# Patient Record
Sex: FEMALE | Race: WHITE | Employment: FULL TIME | URBAN - NONMETROPOLITAN AREA
[De-identification: names, ages, dates, MRNs, and addresses within clinical notes are randomized per-mention and may not be internally consistent; named-entity substitution may affect disease eponyms.]

---

## 2017-01-25 ENCOUNTER — OFFICE VISIT (OUTPATIENT)
Dept: ORTHOPEDIC SURGERY | Age: 42
End: 2017-01-25

## 2017-01-25 DIAGNOSIS — M25.562 ACUTE PAIN OF LEFT KNEE: Primary | ICD-10-CM

## 2017-01-25 PROCEDURE — 99203 OFFICE O/P NEW LOW 30 MIN: CPT | Performed by: ORTHOPAEDIC SURGERY

## 2017-01-25 PROCEDURE — 73564 X-RAY EXAM KNEE 4 OR MORE: CPT | Performed by: ORTHOPAEDIC SURGERY

## 2017-02-02 ENCOUNTER — OFFICE VISIT (OUTPATIENT)
Dept: ORTHOPEDIC SURGERY | Age: 42
End: 2017-02-02

## 2017-02-02 VITALS — WEIGHT: 130 LBS | HEIGHT: 65 IN | BODY MASS INDEX: 21.66 KG/M2

## 2017-02-02 DIAGNOSIS — M23.307: Primary | ICD-10-CM

## 2017-02-02 PROBLEM — Q68.6 DISCOID MENISCUS OF KNEE: Status: ACTIVE | Noted: 2017-02-02

## 2017-02-02 PROCEDURE — 20610 DRAIN/INJ JOINT/BURSA W/O US: CPT | Performed by: ORTHOPAEDIC SURGERY

## 2017-02-02 PROCEDURE — 99213 OFFICE O/P EST LOW 20 MIN: CPT | Performed by: ORTHOPAEDIC SURGERY

## 2017-02-02 RX ORDER — LEUPROLIDE ACETATE 11.25 MG
KIT INTRAMUSCULAR
COMMUNITY
Start: 2016-10-28 | End: 2017-03-08

## 2017-02-02 RX ORDER — ONABOTULINUMTOXINA 200 [USP'U]/1
INJECTION, POWDER, LYOPHILIZED, FOR SOLUTION INTRADERMAL; INTRAMUSCULAR
COMMUNITY
Start: 2017-01-23

## 2017-02-22 ENCOUNTER — OFFICE VISIT (OUTPATIENT)
Dept: ORTHOPEDIC SURGERY | Age: 42
End: 2017-02-22

## 2017-02-22 DIAGNOSIS — M25.562 ACUTE PAIN OF LEFT KNEE: Primary | ICD-10-CM

## 2017-02-22 DIAGNOSIS — M23.307: ICD-10-CM

## 2017-02-22 PROCEDURE — 99213 OFFICE O/P EST LOW 20 MIN: CPT | Performed by: ORTHOPAEDIC SURGERY

## 2017-02-22 RX ORDER — RIZATRIPTAN BENZOATE 10 MG/1
TABLET ORAL
COMMUNITY
Start: 2017-02-03

## 2017-02-27 ENCOUNTER — TELEPHONE (OUTPATIENT)
Dept: ORTHOPEDIC SURGERY | Age: 42
End: 2017-02-27

## 2017-02-27 DIAGNOSIS — M23.307: Primary | ICD-10-CM

## 2017-02-27 RX ORDER — HYDROCODONE BITARTRATE AND ACETAMINOPHEN 5; 325 MG/1; MG/1
1 TABLET ORAL EVERY 6 HOURS PRN
Qty: 40 TABLET | Refills: 0 | Status: SHIPPED | OUTPATIENT
Start: 2017-02-27 | End: 2017-03-06

## 2017-02-27 RX ORDER — MELOXICAM 15 MG/1
15 TABLET ORAL DAILY
Qty: 30 TABLET | Refills: 3 | Status: SHIPPED | OUTPATIENT
Start: 2017-02-27

## 2017-03-10 ENCOUNTER — HOSPITAL ENCOUNTER (OUTPATIENT)
Dept: SURGERY | Age: 42
Discharge: HOME | End: 2017-03-10
Attending: ORTHOPAEDIC SURGERY | Admitting: ORTHOPAEDIC SURGERY

## 2017-03-10 VITALS
BODY MASS INDEX: 21.23 KG/M2 | RESPIRATION RATE: 12 BRPM | TEMPERATURE: 97.6 F | OXYGEN SATURATION: 100 % | DIASTOLIC BLOOD PRESSURE: 85 MMHG | HEIGHT: 65 IN | HEART RATE: 72 BPM | SYSTOLIC BLOOD PRESSURE: 106 MMHG | WEIGHT: 127.4 LBS

## 2017-03-10 RX ORDER — ONDANSETRON 2 MG/ML
4 INJECTION INTRAMUSCULAR; INTRAVENOUS EVERY 6 HOURS PRN
Status: DISCONTINUED | OUTPATIENT
Start: 2017-03-10 | End: 2017-03-11 | Stop reason: HOSPADM

## 2017-03-10 RX ORDER — SODIUM CHLORIDE, SODIUM LACTATE, POTASSIUM CHLORIDE, CALCIUM CHLORIDE 600; 310; 30; 20 MG/100ML; MG/100ML; MG/100ML; MG/100ML
INJECTION, SOLUTION INTRAVENOUS CONTINUOUS
Status: DISCONTINUED | OUTPATIENT
Start: 2017-03-10 | End: 2017-03-11 | Stop reason: HOSPADM

## 2017-03-10 RX ORDER — ONDANSETRON 2 MG/ML
4 INJECTION INTRAMUSCULAR; INTRAVENOUS
Status: ACTIVE | OUTPATIENT
Start: 2017-03-10 | End: 2017-03-10

## 2017-03-10 RX ORDER — DIPHENHYDRAMINE HYDROCHLORIDE 50 MG/ML
25 INJECTION INTRAMUSCULAR; INTRAVENOUS PRN
Status: DISCONTINUED | OUTPATIENT
Start: 2017-03-10 | End: 2017-03-11 | Stop reason: HOSPADM

## 2017-03-10 RX ORDER — SODIUM CHLORIDE 9 MG/ML
INJECTION, SOLUTION INTRAVENOUS CONTINUOUS
Status: DISCONTINUED | OUTPATIENT
Start: 2017-03-10 | End: 2017-03-11 | Stop reason: HOSPADM

## 2017-03-10 RX ORDER — SODIUM CHLORIDE 0.9 % (FLUSH) 0.9 %
10 SYRINGE (ML) INJECTION EVERY 12 HOURS SCHEDULED
Status: DISCONTINUED | OUTPATIENT
Start: 2017-03-10 | End: 2017-03-11 | Stop reason: HOSPADM

## 2017-03-10 RX ORDER — ACETAMINOPHEN 325 MG/1
650 TABLET ORAL EVERY 4 HOURS PRN
Status: DISCONTINUED | OUTPATIENT
Start: 2017-03-10 | End: 2017-03-10 | Stop reason: SDUPTHER

## 2017-03-10 RX ORDER — FENTANYL CITRATE 50 UG/ML
25 INJECTION, SOLUTION INTRAMUSCULAR; INTRAVENOUS EVERY 5 MIN PRN
Status: DISCONTINUED | OUTPATIENT
Start: 2017-03-10 | End: 2017-03-11 | Stop reason: HOSPADM

## 2017-03-10 RX ORDER — DOCUSATE SODIUM 100 MG/1
100 CAPSULE, LIQUID FILLED ORAL 2 TIMES DAILY
Status: DISCONTINUED | OUTPATIENT
Start: 2017-03-10 | End: 2017-03-10 | Stop reason: SDUPTHER

## 2017-03-10 RX ORDER — SODIUM CHLORIDE 0.9 % (FLUSH) 0.9 %
10 SYRINGE (ML) INJECTION PRN
Status: DISCONTINUED | OUTPATIENT
Start: 2017-03-10 | End: 2017-03-11 | Stop reason: HOSPADM

## 2017-03-10 RX ORDER — CEFAZOLIN SODIUM 2 G/100ML
2 INJECTION, SOLUTION INTRAVENOUS
Status: DISCONTINUED | OUTPATIENT
Start: 2017-03-10 | End: 2017-03-10 | Stop reason: SDUPTHER

## 2017-03-10 RX ORDER — ACETAMINOPHEN 325 MG/1
650 TABLET ORAL EVERY 4 HOURS PRN
Status: DISCONTINUED | OUTPATIENT
Start: 2017-03-10 | End: 2017-03-11 | Stop reason: HOSPADM

## 2017-03-10 RX ORDER — CEFAZOLIN SODIUM 2 G/100ML
2 INJECTION, SOLUTION INTRAVENOUS
Status: COMPLETED | OUTPATIENT
Start: 2017-03-10 | End: 2017-03-10

## 2017-03-10 RX ORDER — HYDROMORPHONE HCL 110MG/55ML
0.5 PATIENT CONTROLLED ANALGESIA SYRINGE INTRAVENOUS EVERY 5 MIN PRN
Status: DISCONTINUED | OUTPATIENT
Start: 2017-03-10 | End: 2017-03-11 | Stop reason: HOSPADM

## 2017-03-10 RX ORDER — ONDANSETRON 2 MG/ML
4 INJECTION INTRAMUSCULAR; INTRAVENOUS EVERY 6 HOURS PRN
Status: DISCONTINUED | OUTPATIENT
Start: 2017-03-10 | End: 2017-03-10 | Stop reason: SDUPTHER

## 2017-03-10 RX ORDER — DOCUSATE SODIUM 100 MG/1
100 CAPSULE, LIQUID FILLED ORAL 2 TIMES DAILY
Status: DISCONTINUED | OUTPATIENT
Start: 2017-03-10 | End: 2017-03-11 | Stop reason: HOSPADM

## 2017-03-10 RX ORDER — MORPHINE SULFATE 2 MG/ML
1 INJECTION, SOLUTION INTRAMUSCULAR; INTRAVENOUS EVERY 5 MIN PRN
Status: DISCONTINUED | OUTPATIENT
Start: 2017-03-10 | End: 2017-03-11 | Stop reason: HOSPADM

## 2017-03-10 RX ADMIN — CEFAZOLIN SODIUM 2 G: 2 INJECTION, SOLUTION INTRAVENOUS at 08:21

## 2017-03-10 RX ADMIN — SODIUM CHLORIDE, SODIUM LACTATE, POTASSIUM CHLORIDE, CALCIUM CHLORIDE: 600; 310; 30; 20 INJECTION, SOLUTION INTRAVENOUS at 08:02

## 2017-03-10 ASSESSMENT — PAIN - FUNCTIONAL ASSESSMENT: PAIN_FUNCTIONAL_ASSESSMENT: 0-10

## 2017-03-10 ASSESSMENT — PAIN DESCRIPTION - DESCRIPTORS: DESCRIPTORS: ACHING;DISCOMFORT

## 2017-03-23 ENCOUNTER — OFFICE VISIT (OUTPATIENT)
Dept: ORTHOPEDIC SURGERY | Age: 42
End: 2017-03-23

## 2017-03-23 DIAGNOSIS — M25.562 ACUTE PAIN OF LEFT KNEE: Primary | ICD-10-CM

## 2017-03-23 PROCEDURE — 99024 POSTOP FOLLOW-UP VISIT: CPT | Performed by: ORTHOPAEDIC SURGERY

## 2017-03-30 ENCOUNTER — HOSPITAL ENCOUNTER (OUTPATIENT)
Dept: PHYSICAL THERAPY | Age: 42
Discharge: HOME | End: 2017-03-31
Admitting: ORTHOPAEDIC SURGERY

## 2017-05-03 ENCOUNTER — OFFICE VISIT (OUTPATIENT)
Dept: ORTHOPEDIC SURGERY | Age: 42
End: 2017-05-03

## 2017-05-03 DIAGNOSIS — M25.562 ACUTE PAIN OF LEFT KNEE: Primary | ICD-10-CM

## 2017-05-03 DIAGNOSIS — M23.307: ICD-10-CM

## 2017-05-03 PROCEDURE — 99024 POSTOP FOLLOW-UP VISIT: CPT | Performed by: ORTHOPAEDIC SURGERY

## 2017-06-14 ENCOUNTER — OFFICE VISIT (OUTPATIENT)
Dept: ORTHOPEDIC SURGERY | Age: 42
End: 2017-06-14

## 2017-06-14 VITALS — BODY MASS INDEX: 21.16 KG/M2 | HEIGHT: 65 IN | WEIGHT: 127 LBS

## 2017-06-14 DIAGNOSIS — M23.307: ICD-10-CM

## 2017-06-14 DIAGNOSIS — M25.562 ACUTE PAIN OF LEFT KNEE: Primary | ICD-10-CM

## 2017-06-14 PROCEDURE — 99213 OFFICE O/P EST LOW 20 MIN: CPT | Performed by: ORTHOPAEDIC SURGERY

## 2017-06-14 RX ORDER — METHYLPREDNISOLONE 4 MG/1
TABLET ORAL
COMMUNITY
Start: 2017-04-21 | End: 2019-08-19

## 2017-06-14 RX ORDER — AZITHROMYCIN 250 MG/1
TABLET, FILM COATED ORAL
COMMUNITY
Start: 2017-05-26

## 2017-06-14 RX ORDER — HYDROCODONE BITARTRATE AND ACETAMINOPHEN 5; 325 MG/1; MG/1
TABLET ORAL
COMMUNITY
Start: 2017-03-10 | End: 2021-04-29 | Stop reason: ALTCHOICE

## 2018-01-17 ENCOUNTER — OFFICE VISIT (OUTPATIENT)
Dept: ORTHOPEDIC SURGERY | Age: 43
End: 2018-01-17

## 2018-01-17 VITALS — WEIGHT: 127 LBS | BODY MASS INDEX: 21.16 KG/M2 | HEIGHT: 65 IN

## 2018-01-17 DIAGNOSIS — M25.511 ACUTE PAIN OF RIGHT SHOULDER: Primary | ICD-10-CM

## 2018-01-17 PROCEDURE — 73030 X-RAY EXAM OF SHOULDER: CPT | Performed by: ORTHOPAEDIC SURGERY

## 2018-01-17 PROCEDURE — 99214 OFFICE O/P EST MOD 30 MIN: CPT | Performed by: ORTHOPAEDIC SURGERY

## 2018-01-24 ENCOUNTER — OFFICE VISIT (OUTPATIENT)
Dept: ORTHOPEDIC SURGERY | Age: 43
End: 2018-01-24

## 2018-01-24 VITALS — HEIGHT: 65 IN | BODY MASS INDEX: 21.16 KG/M2 | WEIGHT: 127 LBS

## 2018-01-24 DIAGNOSIS — M75.31 CALCIFIC TENDINITIS OF RIGHT SHOULDER: Primary | ICD-10-CM

## 2018-01-24 PROCEDURE — 99214 OFFICE O/P EST MOD 30 MIN: CPT | Performed by: ORTHOPAEDIC SURGERY

## 2018-01-24 RX ORDER — PREDNISONE 10 MG/1
TABLET ORAL
Qty: 30 TABLET | Refills: 0 | Status: SHIPPED | OUTPATIENT
Start: 2018-01-24 | End: 2019-08-19

## 2018-01-29 ENCOUNTER — HOSPITAL ENCOUNTER (OUTPATIENT)
Dept: PHYSICAL THERAPY | Age: 43
Discharge: HOME | End: 2018-01-31
Admitting: ORTHOPAEDIC SURGERY

## 2018-01-29 NOTE — FLOWSHEET NOTE
[address] [place] [address] [place] [address]  Phone: [phone] Fax: [phone]      Physical Therapy Daily Treatment Note  Date:  2018    Patient Name:  Adelita Montalvo    :  1975  MRN: [id number]  Restrictions/Precautions:    Medical/Treatment Diagnosis Information:  · Diagnosis: right shoulder pain  · Treatment Diagnosis: V00.73  Insurance/Certification information:  PT Insurance Information: [place] 40 visits  Physician Information:  Referring Practitioner: Radha Calixto DO  Plan of care signed (Y/N):     Date of Patient follow up with Physician:     G-Code (if applicable):      Date G-Code Applied:    PT G-Codes  Functional Assessment Tool Used: DASH  Score: 23%  Functional Limitation: Carrying, moving and handling objects  Carrying, Moving and Handling Objects Current Status (): At least 20 percent but less than 40 percent impaired, limited or restricted  Carrying, Moving and Handling Objects Goal Status ():  At least 1 percent but less than 20 percent impaired, limited or restricted    Progress Note: [x]  Yes  []  No  Next due by: Visit #10      Latex Allergy:  [x]NO      []YES  Preferred Language for Healthcare:   [x]English       []other:    Visit # Insurance Allowable   1 40     Pain level:  4-7/10     SUBJECTIVE:  See eval    OBJECTIVE: See eval  Observation:   Test measurements:      RESTRICTIONS/PRECAUTIONS:     Exercises/Interventions:   Therapeutic Ex X5 Sets/sec Reps Notes   UBE      Cane AAROM flex/press      3 way Isomet      T- band Row/pinch 2 10 red   T- band lower pinch      T- band ER activation      Supine SA punch      SL ER      Prone Rows/ext 2 10    Seat Table slides/Wall Slides      Seated HH  Depression      No Money      Standing flex/scap      Standing Punch      Lawnmower      UT/levator stretch 20\" 1    ECRB stretch 20\" 1                Manual Intervention X18'      Shld /GH Mobs 5'     c-spine STM 5'     Thoracic/Rib manipualtion      CT/Mobs 5'     PROM MT      Elbow mobs/STM 3'           NMR re-education      T-spine Ext      GH depress/compress      Scap/GH NMR      Body blade      Wall ball roll      Wall Ball bounce      Ball drops      Dionne Scap Bio          Therapeutic Exercise and NMR EXR  [x] (11352) Provided verbal/tactile cueing for activities related to strengthening, flexibility, endurance, ROM  for improvements in scapular, scapulothoracic and UE control with self care, reaching, carrying, lifting, house/yardwork, driving/computer work.    [] (86327) Provided verbal/tactile cueing for activities related to improving balance, coordination, kinesthetic sense, posture, motor skill, proprioception  to assist with  scapular, scapulothoracic and UE control with self care, reaching, carrying, lifting, house/yardwork, driving/computer work. Therapeutic Activities:    [] (39326 or 49526) Provided verbal/tactile cueing for activities related to improving balance, coordination, kinesthetic sense, posture, motor skill, proprioception and motor activation to allow for proper function of scapular, scapulothoracic and UE control with self care, carrying, lifting, driving/computer work.      Home Exercise Program:    [x] (17356) Reviewed/Progressed HEP activities related to strengthening, flexibility, endurance, ROM of scapular, scapulothoracic and UE control with self care, reaching, carrying, lifting, house/yardwork, driving/computer work  [] (18820) Reviewed/Progressed HEP activities related to improving balance, coordination, kinesthetic sense, posture, motor skill, proprioception of scapular, scapulothoracic and UE control with self care, reaching, carrying, lifting, house/yardwork, driving/computer work      Manual Treatments:  PROM / STM / Oscillations-Mobs:  G-I, II, III, IV (PA's, Inf., Post.)  [x] (19915) Provided manual therapy to mobilize soft tissue/joints of cervical/CT, scapular GHJ and UE for the purpose of modulating pain, promoting relaxation,  increasing ROM, reducing/eliminating soft tissue swelling/inflammation/restriction, improving soft tissue extensibility and allowing for proper ROM for normal function with self care, reaching, carrying, lifting, house/yardwork, driving/computer work    Modalities:      Charges:  Timed Code Treatment Minutes: 23   Total Treatment Minutes: 60     [x] EVAL (LOW) 64836 (typically 20 minutes face-to-face)  [] EVAL (MOD) 82928 (typically 30 minutes face-to-face)  [] EVAL (HIGH) 47870 (typically 45 minutes face-to-face)  [] RE-EVAL     [] MT(16876) x      [] IONTO  [] NMR (77657) x      [] VASO  [x] Manual (96091) x  1    [] Other:  [] TA x       [] Mech Traction (12939)  [] ES(attended) (32585)      [] ES (un) (13984):     GOALS:  Patient stated goal: avoid surgery and decrease pain    Therapist goals for Patient:   Short Term Goals: To be achieved in: 2 weeks  1. Independent in HEP and progression per patient tolerance, in order to prevent re-injury. 2. Patient will have a decrease in pain to facilitate improvement in movement, function, and ADLs as indicated by Functional Deficits. Long Term Goals: To be achieved in: 4-6 weeks  1. Disability index score of 10% or less for the DASH to assist with reaching prior level of function. 2. Patient will demonstrate increased AROM to [facility] to allow for proper joint functioning as indicated by patients Functional Deficits. 3. Patient will demonstrate an increase in Strength to good scapular and core control, w/in 5lbs HHD for UE to allow for proper functional mobility as indicated by patients Functional Deficits. 4. Patient will return to reaching, lifting, sleeping activities without increased symptoms or restriction. Progression Towards Functional goals:  [] Patient is progressing as expected towards functional goals listed. [] Progression is slowed due to complexities listed.   [] Progression has been slowed due to co-morbidities.   [x] Plan just implemented, too soon to assess goals progression  [] Other:     ASSESSMENT:  See eval    Return to Play: (if applicable)   []  Stage 1: Intro to Strength   []  Stage 2: Dynamic Strength and Intro to Plyometrics   []  Stage 3: Advanced Plyometrics and Intro to Throwing   []  Stage 4: Sport specific Training/Return to Sport     []  Ready to Return to Play, [employer]   []  Not Ready for Return to Sports   Comments:      Treatment/Activity Tolerance:  [x] Patient tolerated treatment well [] Patient limited by fatique  [] Patient limited by pain  [] Patient limited by other medical complications  [] Other:     Prognosis: [x] Good [] Fair  [] Poor    Patient Requires Follow-up: [x] Yes  [] No    PLAN: See eval  [] Continue per plan of care [] Alter current plan (see comments)  [x] Plan of care initiated [] Hold pending MD visit [] Discharge    Electronically signed by: Sharon Moreno PT

## 2018-01-29 NOTE — PLAN OF CARE
affect course of rehabilitation):   [x]None           Arthritic conditions   []Rheumatoid arthritis (M05.9)  []Osteoarthritis (M19.91)   Cardiovascular conditions   []Hypertension (I10)  []Hyperlipidemia (E78.5)  []Angina pectoris (I20)  []Atherosclerosis (I70)  []CVA Musculoskeletal conditions   []Disc pathology   []Congenital spine pathologies   []Prior surgical intervention  []Osteoporosis (M81.8)  []Osteopenia (M85.8)   Endocrine conditions   []Hypothyroid (E03.9)  []Hyperthyroid Gastrointestinal conditions   []Constipation (S09.60)   Metabolic conditions   []Morbid obesity (E66.01)  []Diabetes type 1(E10.65) or 2 (E11.65)   []Neuropathy (G60.9)     Pulmonary conditions   []Asthma (J45)  []Coughing   []COPD (J44.9)   Psychological Disorders  []Anxiety (F41.9)  []Depression (F32.9)   []Other:   []Other:          Barriers to/and or personal factors that will affect rehab potential:              []Age  []Sex   []Smoker              []Motivation/Lack of Motivation                        []Co-Morbidities              []Cognitive Function, education/learning barriers              []Environmental, home barriers              []profession/work barriers  []past PT/medical experience  []other:  Justification:     Falls Risk Assessment (30 days):   [x] Falls Risk assessed and no intervention required. [] Falls Risk assessed and Patient requires intervention due to being higher risk   TUG score (>12s at risk):     [] Falls education provided, including       G-Codes:  PT G-Codes  Functional Assessment Tool Used: DASH  Score: 23%  Functional Limitation: Carrying, moving and handling objects  Carrying, Moving and Handling Objects Current Status (): At least 20 percent but less than 40 percent impaired, limited or restricted  Carrying, Moving and Handling Objects Goal Status ():  At least 1 percent but less than 20 percent impaired, limited or restricted    ASSESSMENT: Patient presents with signs and symptoms consistent with labral tear   []Signs/symptoms consistent with postural dysfunction    []Signs/symptoms consistent with Glenohumeral IR Deficit - <45 degrees   [x]Signs/symptoms consistent with facet dysfunction of cervical/thoracic spine    []Signs/symptoms consistent with pathology which may benefit from Dry needling     []other:     Prognosis/Rehab Potential:      [x]Excellent   []Good    []Fair   []Poor    Tolerance of evaluation/treatment:    [x]Excellent   []Good    []Fair   []Poor    Physical Therapy Evaluation Complexity Justification  [x] A history of present problem with:  [x] no personal factors and/or comorbidities that impact the plan of care;  []1-2 personal factors and/or comorbidities that impact the plan of care  []3 personal factors and/or comorbidities that impact the plan of care  [x] An examination of body systems using standardized tests and measures addressing any of the following: body structures and functions (impairments), activity limitations, and/or participation restrictions;:  [x] a total of 1-2 or more elements   [] a total of 3 or more elements   [] a total of 4 or more elements   [x] A clinical presentation with:   [x] stable and/or uncomplicated characteristics   [] evolving clinical presentation with changing characteristics  [] unstable and unpredictable characteristics;   [x] Clinical decision making of [x] low, [] moderate, [] high complexity using standardized patient assessment instrument and/or measurable assessment of functional outcome.     [x] EVAL (LOW) 69336 (typically 20 minutes face-to-face)  [] EVAL (MOD) 15773 (typically 30 minutes face-to-face)  [] EVAL (HIGH) 45204 (typically 45 minutes face-to-face)  [] RE-EVAL     PLAN:  Frequency/Duration:  2 days per week for 4-6 Weeks:  INTERVENTIONS:  [x] Therapeutic exercise including: strength training, ROM, for Upper extremity and core   [x]  NMR activation and proprioception for UE, scap and Core   [x] Manual therapy as

## 2018-02-01 ENCOUNTER — HOSPITAL ENCOUNTER (OUTPATIENT)
Dept: OTHER | Age: 43
Discharge: HOME | End: 2018-02-28
Attending: ORTHOPAEDIC SURGERY | Admitting: ORTHOPAEDIC SURGERY

## 2018-02-16 ENCOUNTER — HOSPITAL ENCOUNTER (OUTPATIENT)
Dept: PHYSICAL THERAPY | Age: 43
Discharge: HOME | End: 2018-02-17
Admitting: ORTHOPAEDIC SURGERY

## 2018-02-16 NOTE — FLOWSHEET NOTE
[address] [place] [address] [place] [address]  Phone: [phone] Fax: [phone]      Physical Therapy Daily Treatment Note  Date:  2018    Patient Name:  Jyothi Wooten    :  1975  MRN: [id number]  Restrictions/Precautions:    Medical/Treatment Diagnosis Information:  · Diagnosis: right shoulder pain  · Treatment Diagnosis: B01.63  Insurance/Certification information:  PT Insurance Information: [place] 40 visits  Physician Information:  Referring Practitioner: Rodriguez Salamanca DO  Plan of care signed (Y/N):     Date of Patient follow up with Physician:     G-Code (if applicable):      Date G-Code Applied:    PT G-Codes  Functional Assessment Tool Used: DASH  Score: 23%  Functional Limitation: Carrying, moving and handling objects  Carrying, Moving and Handling Objects Current Status (): At least 20 percent but less than 40 percent impaired, limited or restricted  Carrying, Moving and Handling Objects Goal Status (): At least 1 percent but less than 20 percent impaired, limited or restricted    Progress Note: [x]  Yes  []  No  Next due by: Visit #10      Latex Allergy:  [x]NO      []YES  Preferred Language for Healthcare:   [x]English       []other:    Visit # Insurance Allowable   2 40     Pain level:  0/10     SUBJECTIVE:  Feeling much better after first session. No pain in the shoulder today. Pt is eager to ramp up her UE function.       OBJECTIVE: See eval  Observation:   Test measurements:      RESTRICTIONS/PRECAUTIONS:     Exercises/Interventions:   Therapeutic Ex X30 Sets/sec Reps Notes   UBE   add   Cane AAROM flex/press      3 way Isomet      T- band Row/pinch 2 10 blue   T- band lower pinch 2 10 blue   SL punch 2 10 2lb   T- band ER activation      Supine SA punch 5s 15 2lb   SL ER 2 10 2lb   Prone Rows/ext/HAB 2 10 3/2/2   Seat Table slides/Wall Slides      Seated HH  Depression      No Money      Standing flex/scap

## 2018-02-21 ENCOUNTER — HOSPITAL ENCOUNTER (OUTPATIENT)
Dept: PHYSICAL THERAPY | Age: 43
Discharge: HOME | End: 2018-02-22
Admitting: ORTHOPAEDIC SURGERY

## 2018-02-21 NOTE — FLOWSHEET NOTE
Standing Punch      Lawnmower 2 10 3lb   UT/levator stretch 20\" 1    ECRB stretch 20\" 1                Manual Intervention X10      Shld /GH Mobs 4     c-spine STM      Thoracic/Rib manipualtion      CT/Mobs      PROM MT 6     Elbow mobs/STM            NMR re-education x 15'      T-spine Ext      GH depress/compress      Scap/GH NMR      Body blade 4 30\"    Wall ball roll 2 20 Red  Flex/scap   Wall diagonals 3 5 orange         Dionne Scap Bio          Therapeutic Exercise and NMR EXR  [x] (75482) Provided verbal/tactile cueing for activities related to strengthening, flexibility, endurance, ROM  for improvements in scapular, scapulothoracic and UE control with self care, reaching, carrying, lifting, house/yardwork, driving/computer work.    [] (90001) Provided verbal/tactile cueing for activities related to improving balance, coordination, kinesthetic sense, posture, motor skill, proprioception  to assist with  scapular, scapulothoracic and UE control with self care, reaching, carrying, lifting, house/yardwork, driving/computer work. Therapeutic Activities:    [] (93845 or 13919) Provided verbal/tactile cueing for activities related to improving balance, coordination, kinesthetic sense, posture, motor skill, proprioception and motor activation to allow for proper function of scapular, scapulothoracic and UE control with self care, carrying, lifting, driving/computer work.      Home Exercise Program:    [x] (22165) Reviewed/Progressed HEP activities related to strengthening, flexibility, endurance, ROM of scapular, scapulothoracic and UE control with self care, reaching, carrying, lifting, house/yardwork, driving/computer work  [] (57192) Reviewed/Progressed HEP activities related to improving balance, coordination, kinesthetic sense, posture, motor skill, proprioception of scapular, scapulothoracic and UE control with self care, reaching, carrying, lifting, house/yardwork, driving/computer work      Manual

## 2018-03-01 ENCOUNTER — HOSPITAL ENCOUNTER (OUTPATIENT)
Dept: OTHER | Age: 43
Discharge: HOME | End: 2018-03-31
Attending: ORTHOPAEDIC SURGERY | Admitting: ORTHOPAEDIC SURGERY

## 2018-03-07 ENCOUNTER — OFFICE VISIT (OUTPATIENT)
Dept: ORTHOPEDIC SURGERY | Age: 43
End: 2018-03-07

## 2018-03-07 VITALS — WEIGHT: 126.98 LBS | HEIGHT: 65 IN | BODY MASS INDEX: 21.16 KG/M2

## 2018-03-07 DIAGNOSIS — M75.31 CALCIFIC TENDINITIS OF RIGHT SHOULDER: Primary | ICD-10-CM

## 2018-03-07 PROCEDURE — 99213 OFFICE O/P EST LOW 20 MIN: CPT | Performed by: ORTHOPAEDIC SURGERY

## 2018-03-07 NOTE — PROGRESS NOTES
or lesions. Strength and tone are normal.  Neck: Examination of the neck does not show any tenderness, deformity or injury. Range of motion is unremarkable. There is no gross instability. There are no rashes, ulcerations or lesions. Strength and tone are normal.    Past Surgical History:   Procedure Laterality Date     SECTION      x2    CHOLECYSTECTOMY      Laparoscopic    HYSTERECTOMY      KNEE ARTHROSCOPY Left     KNEE ARTHROSCOPY Left 03/10/2017    PARTIAL LATERAL MENISCECTOMY, SYNOVECTOMY    PELVIC LAPAROSCOPY      x 12 for endometriosis     Assessment:  Right shoulder calcific tendinitis doing extremely well no pain    Impression: Since she is back doing all regular activities we'll just have her continue to do her exercise regime    Office Procedures:  No orders of the defined types were placed in this encounter. Previous Treatments:  X-ray, MRI, physical therapy,    Treatment Plan:  Continue exercises follow-up as needed      [facility]  Board Certified  [employer] Team Physician

## 2019-04-22 ENCOUNTER — OFFICE VISIT (OUTPATIENT)
Dept: ORTHOPEDIC SURGERY | Age: 44
End: 2019-04-22
Payer: COMMERCIAL

## 2019-04-22 VITALS — BODY MASS INDEX: 21.16 KG/M2 | WEIGHT: 126.98 LBS | HEIGHT: 65 IN

## 2019-04-22 DIAGNOSIS — M25.521 RIGHT ELBOW PAIN: Primary | ICD-10-CM

## 2019-04-22 PROCEDURE — 99214 OFFICE O/P EST MOD 30 MIN: CPT | Performed by: ORTHOPAEDIC SURGERY

## 2019-04-22 NOTE — PROGRESS NOTES
History of Present Illness:  Mary Garcia is a 37 y.o. female being seen for the 1st time for right elbow pain severe pain and swelling    Chief complaint that brought the patient in the office today: Right elbow pain      Location right elbow  Severity moderate to severe  Duration since February  Associated sign/symptoms  strength, pain with all motions pain with lifting pain with touching    I have reviewed and discussed the below Pain assessment findings with the patient. Pain Assessment  Location of Pain: Elbow  Location Modifiers: Right  Severity of Pain: 8  Quality of Pain: Aching, Throbbing, Sharp  Duration of Pain: Persistent  Frequency of Pain: Constant  Aggravating Factors: Bending, Straightening, Exercise  Limiting Behavior: Some  Relieving Factors: Nsaids  Result of Injury: No  Work-Related Injury: No  Are there other pain locations you wish to document?: No    Medical History  Patient's medications, allergies, past medical, surgical, social and family histories were reviewed and updated as appropriate.     Past Medical History:   Diagnosis Date    ANTONIA positive     Endometriosis     Lupus     minor,    Meniscus tear     left    Migraines     [place] - neurologist     PONV (postoperative nausea and vomiting)     very severe nausea & vomiting     Family History   Problem Relation Age of Onset    Heart Disease Mother     Diabetes Father      Social History     Socioeconomic History    Marital status:      Spouse name: None    Number of children: None    Years of education: None    Highest education level: None   Occupational History    None   Social Needs    Financial resource strain: None    Food insecurity:     Worry: None     Inability: None    Transportation needs:     Medical: None     Non-medical: None   Tobacco Use    Smoking status: Never Smoker    Smokeless tobacco: Never Used   Substance and Sexual Activity    Alcohol use: Yes     Comment: socially    Drug use: No    Sexual activity: None   Lifestyle    Physical activity:     Days per week: None     Minutes per session: None    Stress: None   Relationships    Social connections:     Talks on phone: None     Gets together: None     Attends [employer] service: None     Active member of club or organization: None     Attends meetings of clubs or organizations: None     Relationship status: None    Intimate partner violence:     Fear of current or ex partner: None     Emotionally abused: None     Physically abused: None     Forced sexual activity: None   Other Topics Concern    None   Social History Narrative    None     Current Outpatient Medications   Medication Sig Dispense Refill    predniSONE (DELTASONE) 10 MG tablet Days1-2:50mg PO QD,Days3-4:40mg PO QD,Days5-6:30mg PO QD,Days7-8:20mg PO QD,Days 9-10:10mg PO QD 30 tablet 0    azithromycin (ZITHROMAX) 250 MG tablet       HYDROcodone-acetaminophen (NORCO) 5-325 MG per tablet .  methylPREDNISolone (MEDROL DOSEPACK) 4 MG tablet       topiramate (TOPAMAX) 200 MG tablet Take 100 mg by mouth 2 times daily      Prenatal Multivit-Min-Fe-FA (PRE- PO) Take by mouth      meloxicam (MOBIC) 15 MG tablet Take 1 tablet by mouth daily 30 tablet 3    rizatriptan (MAXALT) 10 MG tablet prn      OnabotulinumtoxinA (BOTOX) 200 UNITS SOLR Every 3 months       No current facility-administered medications for this visit. Allergies   Allergen Reactions    Ultram [Tramadol Hcl] Itching       REVIEW OF SYSTEMS:   No rashes today  No new numbness  No tingling  No fevers  No depression  No new onset of pain        Pertinent items are noted in HPI  Review of systems reviewed from Patient History Form dated on 19 and available in the patient's chart under the Media tab. Examination:    General Exam:    Vitals: Height 5' 5\" (1.651 m), weight 126 lb 15.8 oz (57.6 kg), not currently breastfeeding.   Constitutional: Patient is injury. Range of motion is unremarkable. There is no gross instability. There are no rashes, ulcerations or lesions. Strength and tone are normal.  Left Lower Extremity: Examination of the left lower extremity does not show any tenderness, deformity or injury. Range of motion is unremarkable. There is no gross instability. There are no rashes, ulcerations or lesions. Strength and tone are normal.  Left Upper Extremity: Examination of the left upper extremity does not show any tenderness, deformity or injury. Range of motion is unremarkable. There is no gross instability. There are no rashes, ulcerations or lesions. Strength and tone are normal.  Neck: Examination of the neck does not show any tenderness, deformity or injury. Range of motion is unremarkable. There is no gross instability. There are no rashes, ulcerations or lesions. Strength and tone are normal.      Diagnostic Testing:    Views AP lateral  and I independently reviewed these films today in my office,   Body Part right elbow Impression no fracture no dislocation no signs of any arthritic wear          Assessment:  Severe lateral epicondylitis with step-off possible tear    Impression: Same    Office Procedures:  Orders Placed This Encounter   Procedures    XR ELBOW RIGHT (2 VIEWS)     Standing Status:   Future     Number of Occurrences:   1     Standing Expiration Date:   4/22/2020       Treatment Plan:  MRI right elbow and follow-up      Hailee Sharp D.O. [address] [place] [address] 20 and Sports Medicine  [facility]  Board Certified  [employer] Team Physician      Disclaimer: \"This note was dictated with voice recognition software. Though review and correction are routine, we apologize for any errors. \"

## 2019-05-02 ENCOUNTER — OFFICE VISIT (OUTPATIENT)
Dept: ORTHOPEDIC SURGERY | Age: 44
End: 2019-05-02
Payer: COMMERCIAL

## 2019-05-02 VITALS — HEIGHT: 65 IN | WEIGHT: 126.98 LBS | BODY MASS INDEX: 21.16 KG/M2

## 2019-05-02 DIAGNOSIS — M25.521 RIGHT ELBOW PAIN: Primary | ICD-10-CM

## 2019-05-02 PROCEDURE — 99214 OFFICE O/P EST MOD 30 MIN: CPT | Performed by: ORTHOPAEDIC SURGERY

## 2019-05-02 RX ORDER — PREDNISONE 10 MG/1
TABLET ORAL
Qty: 30 TABLET | Refills: 0 | Status: SHIPPED | OUTPATIENT
Start: 2019-05-02 | End: 2019-08-19

## 2019-05-02 NOTE — PROGRESS NOTES
History of Present Illness:  Cleatis Kussmaul is a 37 y.o. female recheck evaluation right elbow pain    Location right elbow  Severity moderate to severe  Duration right elbow  Associated sign/symptoms pain with all activities especially extension of the wrist    I have reviewed and discussed the below Pain assessment findings with the patient. Medical History  Patient's medications, allergies, past medical, surgical, social and family histories were reviewed and updated as appropriate.     Past Medical History:   Diagnosis Date    ANTONIA positive     Endometriosis     Lupus     minor,    Meniscus tear     left    Migraines     [place] - neurologist     PONV (postoperative nausea and vomiting)     very severe nausea & vomiting     Family History   Problem Relation Age of Onset    Heart Disease Mother     Diabetes Father      Social History     Socioeconomic History    Marital status:      Spouse name: None    Number of children: None    Years of education: None    Highest education level: None   Occupational History    None   Social Needs    Financial resource strain: None    Food insecurity:     Worry: None     Inability: None    Transportation needs:     Medical: None     Non-medical: None   Tobacco Use    Smoking status: Never Smoker    Smokeless tobacco: Never Used   Substance and Sexual Activity    Alcohol use: Yes     Comment: socially    Drug use: No    Sexual activity: None   Lifestyle    Physical activity:     Days per week: None     Minutes per session: None    Stress: None   Relationships    Social connections:     Talks on phone: None     Gets together: None     Attends [employer] service: None     Active member of club or organization: None     Attends meetings of clubs or organizations: None     Relationship status: None    Intimate partner violence:     Fear of current or ex partner: None     Emotionally abused: None     Physically abused: None     Forced sexual activity: None   Other Topics Concern    None   Social History Narrative    None     Current Outpatient Medications   Medication Sig Dispense Refill    predniSONE (DELTASONE) 10 MG tablet Days1-2:50mg PO QD,Days3-4:40mg PO QD,Days5-6:30mg PO QD,Days7-8:20mg PO QD,Days 9-10:10mg PO QD 30 tablet 0    azithromycin (ZITHROMAX) 250 MG tablet       HYDROcodone-acetaminophen (NORCO) 5-325 MG per tablet .  methylPREDNISolone (MEDROL DOSEPACK) 4 MG tablet       topiramate (TOPAMAX) 200 MG tablet Take 100 mg by mouth 2 times daily      Prenatal Multivit-Min-Fe-FA (PRE- PO) Take by mouth      meloxicam (MOBIC) 15 MG tablet Take 1 tablet by mouth daily 30 tablet 3    rizatriptan (MAXALT) 10 MG tablet prn      OnabotulinumtoxinA (BOTOX) 200 UNITS SOLR Every 3 months       No current facility-administered medications for this visit. Allergies   Allergen Reactions    Ultram [Tramadol Hcl] Itching       REVIEW OF SYSTEMS:   Pertinent items are noted in HPI  Review of systems reviewed from Patient History Form dated on 19 and available in the patient's chart under the Media tab. Examination:    General Exam:    Vitals: Height 5' 5\" (1.651 m), weight 126 lb 15.8 oz (57.6 kg), not currently breastfeeding. Constitutional: Patient is adequately groomed with no evidence of malnutrition  Mental Status: The patient is oriented to time, place and person. The patient's mood and affect are appropriate.         Elbow Examination  Inspection:  No obvious deformity    Palpation:  Severe pain along the lateral epicondyle    Rang of Motion:  Range of motion is limited secondary to pain    Strength:  Excellent strength internal/external rotation and supraspinatus isolation bilaterally,Shoulder shrug is 5 over 5 , cervical spine strength is excellent, flexion extension at the elbow is 5 over 5 wrist and hand strength is equal bilaterally no winging no muscle have her see Dr. Eleno Do, DO

## 2019-05-08 ENCOUNTER — OFFICE VISIT (OUTPATIENT)
Dept: ORTHOPEDIC SURGERY | Age: 44
End: 2019-05-08
Payer: COMMERCIAL

## 2019-05-08 VITALS — HEIGHT: 66 IN | BODY MASS INDEX: 20.89 KG/M2 | WEIGHT: 130 LBS

## 2019-05-08 DIAGNOSIS — M77.01 MEDIAL EPICONDYLITIS OF ELBOW, RIGHT: ICD-10-CM

## 2019-05-08 DIAGNOSIS — M77.11 LATERAL EPICONDYLITIS OF RIGHT ELBOW: Primary | ICD-10-CM

## 2019-05-08 PROCEDURE — 99213 OFFICE O/P EST LOW 20 MIN: CPT | Performed by: ORTHOPAEDIC SURGERY

## 2019-05-08 PROCEDURE — MISCD83 BANDIT: Performed by: ORTHOPAEDIC SURGERY

## 2019-05-08 NOTE — PROGRESS NOTES
Chief Complaint   Patient presents with    Elbow Pain     np right elbow. pain since feb. 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 Symptoms have been intermittent. The pain is aggravated by grasping and lifting and relieved by rest. She has noted no out of proportion swelling, weakness, erythema, or other signs of inflammation. Symptoms are worsening over time. Previous treatment: Physical therapy as well as Medrol Dosepak with minimal benefit  She is also taking an oral anti-inflammatory nonsteroidal as well as wears shean elbow sleeve which does provide some benefit  She does report some intermittent numbness and tingling in her fingers nonspecific and not related to any specific complaints at night  She does have pain at night in her right elbow that does awaken her. An MRI was recently completed of her right elbow demonstrating common extensor tendinosis with partial-thickness tear     was referred for a hand surgery/elbow specialty evaluation by:  Dr. Cindy Acharya. Medical History:  Patient's medications, allergies, past medical, surgical, social and family histories were reviewed and updated as appropriate. ROS: Pertinent items are noted in HPI  Review of systems reviewed from Patient History Form dated on 5/8/19 and available in the patient's chart under the Media tab. PHYSICAL EXAMINATION:    Gen/Psych: Examination reveals a pleasant individual in no acute distress. The patient is oriented to time, place and person. The patient's mood and affect are appropriate. Lymph: The lymphatic examination bilaterally reveals all areas to be without enlargement or induration. Skin: intact without lymphadenopathy, discoloration, or abnormal temperature. Vascular:  There is intact, symmetric circulation in both upper extremities. Musculoskeletal       Cervical spine, shoulders, wrist:  satisfactory baseline range of motion,                                                                           strength, and stability         right Elbow:   Satisfactory range of motion, tenderness over the lateral epicondyle and common extensor origin with mild tenderness medially. Minimal discomfort at the radial tunnel. There is pain and mild weakness with resisted wrist and middle finger extension testing. Range of motion of elbow 5-130° compared to 0-135° contralateral elbow, no mechanical symptoms throughout range of motion  Painless gentle active pronation and supination 70° and 70°  Negative Tinel's overlying cubital tunnel and no subluxation of ulnar nerve with range of motion         Contralateral Elbow:  Satisfactory range of motion. No significant tenderness over lateral and medial epicondyle. No significant pain or weakness with resisted wrist/finger extensor, supination, flexor, and pronator strength testing. Neurological:  Sensation is subjectively normal in the forearm and hands bilaterally    DIAGNOSTIC TESTING:     X-rays  reviewed in office:  Images from 2019  Impression 2views of affected elbow reveal satisfactory joint congruity and absence of loose body or fracture. Site: [facility] #: [id number] #: [id number] Procedure: MR Right Elbow joint w/o Contrast ; Reason for Exam: DX: Tendon Injury; This document is confidential medical information.  Unauthorized disclosure or use of this information is prohibited by law. If you are not the intended recipient of this document, please advise us by calling immediately [phone].       [facility] [place] [address] [address]           Patient Name: Ryley Mena   Case ID: [id number]   Patient : 1975   Referring Physician: Cleopatra Topete DO   Exam Date: 2019   Exam Description: MR Right Elbow joint w/o Contrast            HISTORY:  Tendon injury.  Pain in the entire joint.       TECHNICAL FACTORS:  Long- and short-axis fat- and water-weighted images were performed.       COMPARISON:  None.       FINDINGS:  Distal biceps, triceps and brachialis tendons intact.       Mild to moderate grade common extensor origin tendinosis/strain and peritendinitis.     Approximately 2.5 x 1.5 x 2mm deep surface partial-thickness tear at the common extensor tendon    origin noted (coronal T2 SPAIR series 301, image 11; sagittal T2 SPAIR series 901, image 15).  Underlying LCL complex intact.       Common flexor tendon origin and underlying MCL complex are intact.       The course of the ulnar nerve in the cubital tunnel is normal.       CONCLUSION:   Mild to moderate grade common extensor origin tendinosis/strain, with a 2.5 x 1.5 x 2mm deep    surface partial-thickness tear.       Thank you for the opportunity to provide your interpretation.               Marlena Helton Asa, MD       A: DESEAN/chucho 04/29/2019 9:06 PM   T: CHUCHO 04/29/2019 9:54 AM       MRI right elbow from 4/29/2019      IMPRESSION AND PLAN: 39-year-old female with approximately 3 months of persistent right lateral elbow pain  1. 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 We discussed the current concepts regarding this diagnosis and the appropriate gold standard evidence-based interventions including rest, lifting modifications, appropriate use of a tennis elbow forearm strap, and physical therapy. This frustrating entity may often require many months before expected resolution. We will provide the appropriate means for conservative care at this time. Appropriate followup plans are discussed with the patient depending on the level of progress with the conservative care.     The patient has initiated conservative management has had frustration secondary to lack of improvement in symptoms. I do think that some of her persistent symptoms are contributed by her activity persistent gripping and lifting both at work and during her exercising. I did offer her referral specifically to our occupational therapist at [facility] [address] for further discussion and education regarding some of her daily activities that I do think the patient will find beneficial. I think that activity modification combined with some modalities and exercises will be beneficial and the patient is in agreement with this    In addition we did provide her with a forearm counterforce brace today to be worn while at work and appropriate application technique discussed with her today    Ultimately if she does not have any improvement with her most recent treatment and I would consider either surgical intervention or referral to additional treatment options such as PRP injection or  Tenex procedure that the patient was provided with information today. I do think this will improve however with our most recent treatment.  She may have a component of medial epicondylitis as well but his less evident and ultimately some of her gripping modification should help with this as well  Offered to see her back at approximate 6 weeks for repeat evaluation of symptoms, call sooner with any questions or concerns

## 2019-06-05 ENCOUNTER — HOSPITAL ENCOUNTER (OUTPATIENT)
Dept: OCCUPATIONAL THERAPY | Age: 44
Setting detail: THERAPIES SERIES
Discharge: HOME | End: 2019-06-05
Payer: COMMERCIAL

## 2019-06-05 PROCEDURE — 97535 SELF CARE MNGMENT TRAINING: CPT | Performed by: OCCUPATIONAL THERAPIST

## 2019-06-05 PROCEDURE — 97110 THERAPEUTIC EXERCISES: CPT | Performed by: OCCUPATIONAL THERAPIST

## 2019-06-05 PROCEDURE — 97112 NEUROMUSCULAR REEDUCATION: CPT | Performed by: OCCUPATIONAL THERAPIST

## 2019-06-05 PROCEDURE — 97165 OT EVAL LOW COMPLEX 30 MIN: CPT | Performed by: OCCUPATIONAL THERAPIST

## 2019-06-05 NOTE — PLAN OF CARE
[facility], [place]  [address],  [address], [facility]  Phone: [phone] Fax: [phone]      Occupational [employer]  Dear Referring Practitioner: Johnny Carlson MD,     We had the pleasure of evaluating the following patient for occupational therapy services at [address] [place] [address]. A summary of our findings can be found in the initial assessment below. This includes our plan of care. If you have any questions or concerns regarding these findings, please do not hesitate to contact me at the office phone number checked above. Thank you for the referral.     Physician Signature:_______________________________Date:__________________  By signing above (or electronic signature), therapists plan is approved by physician      Patient: Reyna Brand   : 1975   MRN: [id number]  Referring Physician: Referring Practitioner: Johnny Carlson MD      Evaluation Date: 2019      Medical Diagnosis Information:  Diagnosis: M77.11 (ICD-10-CM) - Lateral epicondylitis of right elbow, M77.01 (ICD-10-CM) - Medial epicondylitis of elbow, right    Treatment Diagnosis: R elbow pain - M25.521              Insurance information: OT Insurance Information: BCBS  Date of Injury: NA  Date of Surgery: NA      Precautions/ Contra-indications: -  Latex Allergy:  [x]No      []Yes  Pacemaker:  [x] No       [] Yes     Preferred Language for Healthcare:   [x]English       []other:    [x] Patient reported history, allergies, and medications reviewed - see intake form.      SUBJECTIVE: Patient reported deficits/history of current problem: progressive pain in R elbow starting in November, then got much worse in February; seen by PT x 4 weeks with no relief (strengthening exercises, estim); seen by hand surgeon ~ 3 weeks ago who issued Band-It and referred pt to hand therapy    Functional Disability Index: Quick DASH score - 50% perceived disability taken on 6/5/2019    Pain Scale: 5/10 at rest, 9/10 at times  [x]Constant      []Intermittent    []other:  Pain Location:  R elbow  Easing factors: Band-It  Provocative factors: lifting, picking up items, working out, squeezing      Occupational Profile:  Home Enviroment: lives with  [] spouse,  [x] family,  [] alone,  [] significant other,   [] other:    Occupation/School: dental asst    Recreational Activities/Meaningful Interests: working out, gardening    Prior Level of Function: [x] Independent with ADLs/IADLs     [] Assistance needed (describe):    Patient-Identified Primary Performance Deficits (to be addressed in POC):   [] bathing    [x] household tasks    [] dressing    [] self feeding   [] grooming    [] work/education   [] functional mobility   [] sleeping/rest   [] toileting/hygiene   [x] recreational activities   [] driving    [] community/social participation   [] other:     Comorbidities Affecting Functional Performance:     []Anxiety (F41.9)/Depression (F32.9)   []Diabetes Type 1(E10.65) or 2 (E11.65)   []Rheumatoid Arthritis (M05.9)  []Fibromyalgia (M79.7)  []Neuropathy(G60.9)  []Osteoarthritis(M19.91)  [x]None   []Other:    Hand Dominance:    [x]  Right    [] Left      OBJECTIVE:    Involved    AROM: Right Left   Digits: tips to DPFC   0cm   0cm   Thumb tip to DPFC 0cm 0cm   Wrist Ext/Flex            RD/UD 75/77 73/75   Forearm sup/pron   WNL WNL   Elbow ext/flex   5/135 5HE/135   Shoulder Flex                   Abd                   IR/ER WNL WNL        Edema:      At elbow  At wrist 25.6cm  15.7cm 25.1cm  15.9cm        Strength:      II 34 with pain 52   Lateral Pinch 14 15   3 Point Pinch 9 10   Tip Pinch 6 7   MMT: elbow ext                     Flex             Wrist ext                      flex     5/5   5/5  5/5 (with pain)  5/5   5/5  5/5  5/5  5/5       Observations (including splints, bandages, incisions, scars):   Presents with Band-It on R elbow; upon removal, notable divet where increased pressure had been applied    Sensation:  [] No reported deficits  [x] Intact to light touch    [] [place] Trisha test completed, findings as noted:  [] Other:    Palpation: tender over lateral elbow, proximal FA (~ 4 finger breadths distal to LE), and medial epicondyle    Functional Mobility/Transfers/Gait:  [x] Independent - no significant gait deviations  [] Assistance needed   [] Assistive device used: Falls Risk Assessment (30 days):   [x] Falls Risk assessed and no intervention required. [] Falls Risk assessed and Patient requires intervention due to being higher risk   TUG score (>12s at risk):     [] Falls education provided, including      Review Of Systems (ROS): [x]Performed Review of systems (Integumentary, CardioPulmonary, Neurological) by intake and observation. Intake form has been scanned into medical record. Patient has been instructed to contact their primary care physician regarding ROS issues if not already being addressed at this time. ASSESSMENT:   This patient presents with signs and symptoms consistent with the medical diagnosis provided by the referring physician.      Impairments (physical, cognitive and/or psychosocial):  [x] Decreased mobility  [x] Weakness    [] Hypersensitivity   [x] Pain/tenderness   [] Edema/swelling   [] Decreased coordination (fine/gross motor)   [] Impaired body mechanics  [] Sensory loss  [] Loss of balance   [] Other:      Performance Deficits (to be addressed in plan of care):   [] Bathing    [x] Household Tasks    [] Dressing    [] Self Feeding   [] Grooming    [] Work/Education   [] Functional Mobility   [] Sleeping/Rest   [] Toileting/Hygiene   [x] Recreational Activities   [] Driving    [] Community/Social Participation   [] Other:     Rehab Potential:   [] Excellent [x] Good [] Fair  [] Poor     Barriers affecting rehab potential:  []Age    []Lack of Motivation   []Co-Morbidities  []Cognitive

## 2019-06-05 NOTE — FLOWSHEET NOTE
[facility], [place]   [address],  [address], [address] [place] [address]  Phone: [phone] Fax: [phone]      Hand Therapy Daily Treatment Note  Date:  2019    Patient: Patience Ovalle   : 1975   MRN: [id number]  Referring Physician: Referring Practitioner: Surekha Munguia MD       Medical Diagnosis Information:  Diagnosis: M77.11 (ICD-10-CM) - Lateral epicondylitis of right elbow, M77.01 (ICD-10-CM) - Medial epicondylitis of elbow, right    Treatment Diagnosis: R elbow pain - M25.521                                         Insurance information:   Date of Injury:NA  Date of Surgery:NA      Visit # Insurance Allowable   1 40     Date of Patient follow up with Physician: prn    Progress Note: []  Yes  [x]  No  Next due by: Visit #10      Latex Allergy:  [x]No      []Yes  Pacemaker:  [x] No       [] Yes     Preferred Language for Healthcare:   [x]English       []other:    SUBJECTIVE: Patient reported deficits/history of current problem: progressive pain in R elbow starting in November, then got much worse in February; seen by PT x 4 weeks with no relief (strengthening exercises, estim); seen by hand surgeon ~ 3 weeks ago who issued Band-It and referred pt to hand therapy     Functional Disability Index: Quick DASH score - 50% perceived disability taken on 2019    Pain Scale: 5/10 at rest, 9/10 at times            [x]Constant         []Intermittent      []other:         RESTRICTIONS/PRECAUTIONS: -    OBJECTIVE:       Date:  2019       Objective Measures:        Elbow ext/flex   See eval        II  Lateral Pinch  3 Point Pinch  Tip Pinch  MMT: wrist ext 34 with pain  14  9  6  5/5 with pain               Modalities:         -                       Therapeutic Exercise, Activities, NMR:        AROM Elbow x 5    Shoulder circles x 5       Proximal/scapular stabilization Prone shoulder ext, \"supermans\", HAB x 10 each       Conditioning Eccentric wrist ext, 1# x 10       Exercise Retraining/NMR Cable Column - 10x each - lat pull down (20#), elbow flex(5#) with horizontal bar and with rope attachment (cueing for submaximal , neutral wrist, painfree exer performance)    Biceps curls (2#) R x 10 (cueing as above)    Tband column - B elbow ext/triceps press, shoulder ext x 10 each (cueing as above)       Composite Stretching 10x wrist ext/flex with elbow ext       ADL Retraining Instructed on diagnosis specific anatomy, joint protection, and ADL modifications - handouts provided       Corner Stretch 5x                 Therapeutic Exercise and NMR:  [x] (68111) Provided verbal/tactile cueing for activities related to strengthening, flexibility, endurance, ROM  for improvements in scapular, scapulothoracic and UE control with self care, reaching, carrying, lifting, house/yardwork, driving/computer work. [x] (19711) Provided verbal/tactile cueing for activities related to improving balance, coordination, kinesthetic sense, posture, motor skill, proprioception  to assist with  scapular, scapulothoracic and UE control with self care, reaching, carrying, lifting, house/yardwork, driving/computer work.   [] Comments:    Therapeutic Activities:    [] (58900 or 17179) Provided verbal/tactile cueing for activities related to improving balance, coordination, kinesthetic sense, posture, motor skill, proprioception and motor activation to allow for proper function of scapular, scapulothoracic and UE control with self care, carrying, lifting, driving/computer work  [] Comments:    Home Exercise Program:    [x] (46428) Reviewed/Progressed HEP activities related to strengthening, flexibility, endurance, ROM of scapular, scapulothoracic and UE control with self care, reaching, carrying, lifting, house/yardwork, driving/computer work  [] (80503) Reviewed/Progressed HEP activities related to improving balance, coordination, kinesthetic sense, posture, motor skill, proprioception of scapular, scapulothoracic and UE control with self care, reaching, carrying, lifting, house/yardwork, driving/computer work    [x] Comments: see sheet    Manual Treatments:  PROM / STM / Oscillations-Mobs:  G-I, II, III, IV (PA's, Inf., Post.)  [x] (44920) Provided manual therapy to mobilize soft tissue/joints of cervical/CT, scapular GHJ and UE for the purpose of modulating pain, promoting relaxation,  increasing ROM, reducing/eliminating soft tissue swelling/inflammation/restriction, improving soft tissue extensibility and allowing for proper ROM for normal function with self care, reaching, carrying, lifting, house/yardwork, driving/computer work  [x] Comments: 3' STM, CFM    ADL Training:  [x] (93095) Provided self-care/home management training related to activities of daily living and compensatory training, and/or use of adaptive equipment   [x] Comments: activities of daily living       Splinting:  [] Fabrication of:   [] (26626) Orthotic/Prosthetic Management, subsequent encounter  [] (13384) Orthotic management and training (fitting and assessment)  [x] Comments:reviewed indications for and aplication ofBand-it strap    Charges:  Timed Code Treatment Minutes: 45   Total Treatment Minutes: 60     [x] EVAL (LOW) 55169   [] OT Re-eval (54332)  [] EVAL (MOD) 13589   [] EVAL (HIGH) 52298       [x] Rivera (70526) x   1  [] PSFWV(84810)  [x] NMR (32645) x   1  [] Estim (attended) (63688)   [] Manual ([id number]) x     [] US (48794)  [] TA (42233) x     [] Paraffin (62623)  [x] ADL  (71171) x  1  [] Splint/L code:    [] Estim (unattended) (26458)  [] Fluidotherapy (48119)  [] Other:    GOALS:  Short Term Goals: To be achieved in: 2 weeks  1. Independent in HEP and progression per patient tolerance, in order to prevent re-injury.    2. Patient will have a decrease in pain to facilitate improvement in movement, function, and ADLs as indicated by Functional Deficits.     Long Term Goals to be achieved in 4-6 weeks, including patient directed goals to address identified performance deficits:  1) Pt to be independent in graded HEP progression with a good level of effort and compliance. 2) Pt to report a score of 25 % or less on the Quick DASH disability questionnaire for increased performance with carrying, moving, and handling objects. 3) Pt will demonstrate increased ROM to R elbow ext increased to 0 degres for improved independence with reaching tasks (reaching into kitchen cabinets). 4) Pt will demonstrate increased strength to R /pinch >/= 75% of L for improved independence with opening jars/containers. 5) Pt will have a decrease in pain to 1-2/10 to facilitate return to working out (recreational activities). 6) Pt will verbalize understanding and demonstrate competency with diagnosis specific ADL modifications and joint protection techniques to enable independence with ADLs, household, and recreational activities. 7)        Progression Towards Functional goals:  [] Patient is progressing as expected towards functional goals listed. [] Progression is slowed due to complexities listed. [] Progression has been slowed due to co-morbidities. [x] Plan just implemented, too soon to assess goals progression  [] All goals are met  [] Other:     ASSESSMENT:  See eval    Treatment/Activity Tolerance:  [x] Patient tolerated treatment well [] Patient limited by fatique  [] Patient limited by pain  [] Patient limited by other medical complications  [] Other:     Prognosis: [x] Good [] Fair  [] Poor    Patient Requires Follow-up: [x] Yes  [] No    PLAN: See eval  [] Continue per plan of care [] Alter current plan (see comments)  [x] Plan of care initiated [] Hold pending MD visit [] Discharge    If patient does not return for further follow ups after this date, please consider this as the patient's discharge from occupational therapy. Electronically signed by:  Marleni Plummer OTR/L, PT, MPT, CHT, [id number], [id number]

## 2019-06-12 ENCOUNTER — HOSPITAL ENCOUNTER (OUTPATIENT)
Dept: OCCUPATIONAL THERAPY | Age: 44
Setting detail: THERAPIES SERIES
Discharge: HOME | End: 2019-06-12
Payer: COMMERCIAL

## 2019-06-12 PROCEDURE — 97110 THERAPEUTIC EXERCISES: CPT | Performed by: OCCUPATIONAL THERAPIST

## 2019-06-12 PROCEDURE — 97112 NEUROMUSCULAR REEDUCATION: CPT | Performed by: OCCUPATIONAL THERAPIST

## 2019-06-12 PROCEDURE — 97035 APP MDLTY 1+ULTRASOUND EA 15: CPT | Performed by: OCCUPATIONAL THERAPIST

## 2019-06-12 PROCEDURE — 97140 MANUAL THERAPY 1/> REGIONS: CPT | Performed by: OCCUPATIONAL THERAPIST

## 2019-06-12 NOTE — FLOWSHEET NOTE
[facility]  [address],  [address], [facility]  Phone: [phone] Fax: [phone]      Hand Therapy Daily Treatment Note  Date:  2019    Patient: Neelam Kelley   : 1975   MRN: [id number]  Referring Physician: Referring Practitioner: Tobin Burgess MD       Medical Diagnosis Information:  Diagnosis: M77.11 (ICD-10-CM) - Lateral epicondylitis of right elbow, M77.01 (ICD-10-CM) - Medial epicondylitis of elbow, right    Treatment Diagnosis: R elbow pain - M25.521                                         Insurance information:   Date of Injury:NA  Date of Surgery:NA      Visit # Insurance Allowable   2 40     Date of Patient follow up with Physician: prn    Progress Note: []  Yes  [x]  No  Next due by: Visit #10      Latex Allergy:  [x]No      []Yes  Pacemaker:  [x] No       [] Yes     Preferred Language for Healthcare:   [x]English       []other:    SUBJECTIVE: doing some better however increased use and soreness yesterday for long work day; compliant with HEP; c/o intermittent locking of LF in the morning    Patient reported deficits/history of current problem: progressive pain in R elbow starting in November, then got much worse in February; seen by PT x 4 weeks with no relief (strengthening exercises, estim); seen by hand surgeon ~ 3 weeks ago who issued Band-It and referred pt to hand therapy     Functional Disability Index: Quick DASH score - 50% perceived disability taken on 2019    Pain Scale: 6/10            [x]Constant         []Intermittent      []other:         RESTRICTIONS/PRECAUTIONS: -    OBJECTIVE:       Date:  2019      Objective Measures:        Elbow ext/flex   See eval 0/140       II  Lateral Pinch  3 Point Pinch  Tip Pinch  MMT: wrist ext 34 with pain  14  9  6  5/5 with pain 44 with pain  14  10  6.5  5/5      Digits: tips to DPFC    AROM WNL,  Small nodule palpated at base of R LF (no formal locking/  triggering at this time)      Modalities:        US, cont, 1.2wcm2, 1MHz, 8' - R lateral elbow                      Therapeutic Exercise, Activities, NMR:        AROM Elbow x 5    Shoulder circles x 5 Elbow x 5    Shoulder circles x 5        Proximal/scapular stabilization Prone shoulder ext, \"supermans\", HAB x 10 each       Conditioning Eccentric wrist ext, 1# x 10       Exercise Retraining/NMR Cable Column - 10x each - lat pull down (20#), elbow flex(5#) with horizontal bar and with rope attachment (cueing for submaximal , neutral wrist, painfree exer performance)    Biceps curls (2#) R x 10 (cueing as above)    Tband column - B elbow ext/triceps press, shoulder ext x 10 each (cueing as above) Therabar, red, B sup/pron x 10x2, R UE sup/pron 10x2 - cueing for submaximal gripping, neutral wrist, proximal stabilization      Composite Stretching 10x wrist ext/flex with elbow ext 10x manual stretching    10x2 composite stretching into Power Web      ADL Retraining Instructed on diagnosis specific anatomy, joint protection, and ADL modifications - handouts provided Reviewed modifications and potential suggestions for work tasks      Corner Stretch 5x                 Therapeutic Exercise and NMR:  [x] (25416) Provided verbal/tactile cueing for activities related to strengthening, flexibility, endurance, ROM  for improvements in scapular, scapulothoracic and UE control with self care, reaching, carrying, lifting, house/yardwork, driving/computer work. [x] (79151) Provided verbal/tactile cueing for activities related to improving balance, coordination, kinesthetic sense, posture, motor skill, proprioception  to assist with  scapular, scapulothoracic and UE control with self care, reaching, carrying, lifting, house/yardwork, driving/computer work.   [x] Comments: discussed potential early signs/symptoms of trigger finger (LF), advised pt consult MD as indicated; advised on gentle modified ROM if symptoms persist    Therapeutic Activities:    [] (57654 or 02196) Provided verbal/tactile cueing for activities related to improving balance, coordination, kinesthetic sense, posture, motor skill, proprioception and motor activation to allow for proper function of scapular, scapulothoracic and UE control with self care, carrying, lifting, driving/computer work  [] Comments:    Home Exercise Program:    [x] (48761) Reviewed/Progressed HEP activities related to strengthening, flexibility, endurance, ROM of scapular, scapulothoracic and UE control with self care, reaching, carrying, lifting, house/yardwork, driving/computer work  [x] (28157) Reviewed/Progressed HEP activities related to improving balance, coordination, kinesthetic sense, posture, motor skill, proprioception of scapular, scapulothoracic and UE control with self care, reaching, carrying, lifting, house/yardwork, driving/computer work    [] Comments:     Manual Treatments:  PROM / STM / Oscillations-Mobs:  G-I, II, III, IV (PA's, Inf., Post.)  [x] (87009) Provided manual therapy to mobilize soft tissue/joints of cervical/CT, scapular GHJ and UE for the purpose of modulating pain, promoting relaxation,  increasing ROM, reducing/eliminating soft tissue swelling/inflammation/restriction, improving soft tissue extensibility and allowing for proper ROM for normal function with self care, reaching, carrying, lifting, house/yardwork, driving/computer work  [x] Comments:12' STM, CFM, DTM, AA/PROM    ADL Training:  [x] (62107) Provided self-care/home management training related to activities of daily living and compensatory training, and/or use of adaptive equipment   [x] Comments: Reviewed on diagnosis specific anatomy, joint protection, and ADL modifications       Splinting:  [] Fabrication of:   [] (01441) Orthotic/Prosthetic Management, subsequent encounter  [] (43202) Orthotic management and training (fitting and assessment)  [] Comments:    Charges:  Timed Code Treatment Minutes: 53   Total Treatment Minutes: 60     [] EVAL (LOW) 88814   [] OT Re-eval (86436)  [] EVAL (MOD) 51317   [] EVAL (HIGH) 12831       [x] Rivera (51634) x   1  [] ZDORG(85790)  [x] NMR (40891) x   1  [] Estim (attended) (90695)   [x] Manual (12940) x 1     [x] US (74420)  [] TA (10917) x     [] Paraffin (30303)  [] ADL  (80225) x   [] Splint/L code:    [] Estim (unattended) (30895)  [] Fluidotherapy (35067)  [] Other:    GOALS:  Short Term Goals: To be achieved in: 2 weeks  1. Independent in HEP and progression per patient tolerance, in order to prevent re-injury. 2. Patient will have a decrease in pain to facilitate improvement in movement, function, and ADLs as indicated by Functional Deficits.     Long Term Goals to be achieved in 4-6  weeks, including patient directed goals to address identified performance deficits:  1) Pt to be independent in graded HEP progression with a good level of effort and compliance. 2) Pt to report a score of 25 % or less on the Quick DASH disability questionnaire for increased performance with carrying, moving, and handling objects. 3) Pt will demonstrate increased ROM to R elbow ext increased to 0 degres for improved independence with reaching tasks (reaching into kitchen cabinets). 4) Pt will demonstrate increased strength to R /pinch >/= 75% of L for improved independence with opening jars/containers. 5) Pt will have a decrease in pain to 1-2/10 to facilitate return to working out (recreational activities). 6) Pt will verbalize understanding and demonstrate competency with diagnosis specific ADL modifications and joint protection techniques to enable independence with ADLs, household, and recreational activities. 7)        Progression Towards Functional goals:  [x] Patient is progressing as expected towards functional goals listed. [] Progression is slowed due to complexities listed. [] Progression has been slowed due to co-morbidities.   [] Plan just implemented, too soon to assess goals progression  [] All goals are met  [] Other:     ASSESSMENT:  Overall pain decreased; potential early signs/symptoms of trigger finger R LF; continue to reinforce activity modifications    Treatment/Activity Tolerance:  [x] Patient tolerated treatment well [] Patient limited by fatique  [] Patient limited by pain  [] Patient limited by other medical complications  [] Other:     Prognosis: [x] Good [] Fair  [] Poor    Patient Requires Follow-up: [x] Yes  [] No    PLAN:   [x] Continue per plan of care [] Alter current plan (see comments)  [] Plan of care initiated [] Hold pending MD visit [] Discharge    If patient does not return for further follow ups after this date, please consider this as the patient's discharge from occupational therapy. Electronically signed by:  Ham Sultana OTR/L, PT, MPT, [address] [place] [address], [phone], [phone]

## 2019-06-26 ENCOUNTER — APPOINTMENT (OUTPATIENT)
Dept: OCCUPATIONAL THERAPY | Age: 44
End: 2019-06-26
Payer: COMMERCIAL

## 2019-07-23 ENCOUNTER — HOSPITAL ENCOUNTER (OUTPATIENT)
Dept: OCCUPATIONAL THERAPY | Age: 44
Setting detail: THERAPIES SERIES
Discharge: HOME | End: 2019-07-23
Payer: COMMERCIAL

## 2019-07-23 DIAGNOSIS — M77.01 MEDIAL EPICONDYLITIS OF ELBOW, RIGHT: Primary | ICD-10-CM

## 2019-07-23 DIAGNOSIS — M77.11 LATERAL EPICONDYLITIS OF RIGHT ELBOW: ICD-10-CM

## 2019-07-23 PROCEDURE — 97110 THERAPEUTIC EXERCISES: CPT | Performed by: OCCUPATIONAL THERAPIST

## 2019-07-23 PROCEDURE — 97140 MANUAL THERAPY 1/> REGIONS: CPT | Performed by: OCCUPATIONAL THERAPIST

## 2019-07-23 PROCEDURE — 97035 APP MDLTY 1+ULTRASOUND EA 15: CPT | Performed by: OCCUPATIONAL THERAPIST

## 2019-07-24 ENCOUNTER — TELEPHONE (OUTPATIENT)
Dept: ORTHOPEDIC SURGERY | Age: 44
End: 2019-07-24

## 2019-07-24 NOTE — TELEPHONE ENCOUNTER
RETURNED PATIENT CALL- WANTED TO GET MORE INFO ON PRP & TENEX. ALSO WANTED TO SCHEDULE AN APPT. EXPLAINED SHE WOULD NEED TO SCHEDULE W/ DR. Mario Alberto Bolden

## 2019-07-31 ENCOUNTER — APPOINTMENT (OUTPATIENT)
Dept: OCCUPATIONAL THERAPY | Age: 44
End: 2019-07-31
Payer: COMMERCIAL

## 2019-08-07 ENCOUNTER — OFFICE VISIT (OUTPATIENT)
Dept: ORTHOPEDIC SURGERY | Age: 44
End: 2019-08-07
Payer: COMMERCIAL

## 2019-08-07 DIAGNOSIS — M66.221 SPONTANEOUS RUPTURE OF EXTENSOR TENDON OF RIGHT UPPER ARM: ICD-10-CM

## 2019-08-07 DIAGNOSIS — M77.11 LATERAL EPICONDYLITIS OF RIGHT ELBOW: ICD-10-CM

## 2019-08-07 DIAGNOSIS — M66.331 SPONTANEOUS RUPTURE OF FLEXOR TENDON OF RIGHT FOREARM: ICD-10-CM

## 2019-08-07 DIAGNOSIS — M77.01 MEDIAL EPICONDYLITIS OF ELBOW, RIGHT: Primary | ICD-10-CM

## 2019-08-07 PROCEDURE — 99243 OFF/OP CNSLTJ NEW/EST LOW 30: CPT | Performed by: INTERNAL MEDICINE

## 2019-08-08 PROCEDURE — L3908 WHO COCK-UP NONMOLDE PRE OTS: HCPCS | Performed by: INTERNAL MEDICINE

## 2019-08-19 ENCOUNTER — OFFICE VISIT (OUTPATIENT)
Dept: ORTHOPEDIC SURGERY | Age: 44
End: 2019-08-19
Payer: COMMERCIAL

## 2019-08-19 VITALS — HEIGHT: 66 IN | WEIGHT: 130 LBS | BODY MASS INDEX: 20.89 KG/M2

## 2019-08-19 DIAGNOSIS — M66.221 SPONTANEOUS RUPTURE OF EXTENSOR TENDON OF RIGHT UPPER ARM: ICD-10-CM

## 2019-08-19 DIAGNOSIS — M77.01 MEDIAL EPICONDYLITIS OF ELBOW, RIGHT: Primary | ICD-10-CM

## 2019-08-19 DIAGNOSIS — M77.11 LATERAL EPICONDYLITIS OF RIGHT ELBOW: ICD-10-CM

## 2019-08-19 DIAGNOSIS — M66.331 SPONTANEOUS RUPTURE OF FLEXOR TENDON OF RIGHT FOREARM: ICD-10-CM

## 2019-08-19 PROCEDURE — 99999 PR OFFICE/OUTPT VISIT,PROCEDURE ONLY: CPT | Performed by: INTERNAL MEDICINE

## 2019-08-19 PROCEDURE — 0232T NJX PLATELET PLASMA: CPT | Performed by: INTERNAL MEDICINE

## 2019-08-19 RX ORDER — HYDROCODONE BITARTRATE AND ACETAMINOPHEN 5; 325 MG/1; MG/1
1 TABLET ORAL EVERY 6 HOURS PRN
Qty: 5 TABLET | Refills: 0 | Status: SHIPPED | OUTPATIENT
Start: 2019-08-19 | End: 2019-08-26

## 2019-08-19 NOTE — PROGRESS NOTES
Reactions    Ultram [Tramadol Hcl] Itching        Social History:         Social History     Socioeconomic History    Marital status:      Spouse name: Not on file    Number of children: Not on file    Years of education: Not on file    Highest education level: Not on file   Occupational History    Not on file   Social Needs    Financial resource strain: Not on file    Food insecurity:     Worry: Not on file     Inability: Not on file    Transportation needs:     Medical: Not on file     Non-medical: Not on file   Tobacco Use    Smoking status: Never Smoker    Smokeless tobacco: Never Used   Substance and Sexual Activity    Alcohol use: Yes     Comment: socially    Drug use: No    Sexual activity: Not on file   Lifestyle    Physical activity:     Days per week: Not on file     Minutes per session: Not on file    Stress: Not on file   Relationships    Social connections:     Talks on phone: Not on file     Gets together: Not on file     Attends Caodaism service: Not on file     Active member of club or organization: Not on file     Attends meetings of clubs or organizations: Not on file     Relationship status: Not on file    Intimate partner violence:     Fear of current or ex partner: Not on file     Emotionally abused: Not on file     Physically abused: Not on file     Forced sexual activity: Not on file   Other Topics Concern    Not on file   Social History Narrative    Not on file        Review of Symptoms:    Pertinent items are noted in HPI        Vital Signs: There were no vitals filed for this visit.      General Exam:     Constitutional: Patient is adequately groomed with no evidence of malnutrition          Physical Exam: right elbow      Primary Exam:    Inspection: Deformity atrophy appreciable effusion      Palpation: Tenderness over the medial lateral epicondyles      Range of Motion: Stable unchanged from previous      Strength: Stable unchanged from previous      Special

## 2019-08-20 LAB
BASOPHILS ABSOLUTE: 0.1 K/UL (ref 0–0.2)
BASOPHILS RELATIVE PERCENT: 1.3 %
EOSINOPHILS ABSOLUTE: 0.2 K/UL (ref 0–0.6)
EOSINOPHILS RELATIVE PERCENT: 4.1 %
HCT VFR BLD CALC: 38.4 % (ref 36–48)
HEMOGLOBIN: 12.3 G/DL (ref 12–16)
LYMPHOCYTES ABSOLUTE: 1.4 K/UL (ref 1–5.1)
LYMPHOCYTES RELATIVE PERCENT: 28.2 %
MCH RBC QN AUTO: 31.7 PG (ref 26–34)
MCHC RBC AUTO-ENTMCNC: 32 G/DL (ref 31–36)
MCV RBC AUTO: 99 FL (ref 80–100)
MONOCYTES ABSOLUTE: 0.4 K/UL (ref 0–1.3)
MONOCYTES RELATIVE PERCENT: 8.2 %
NEUTROPHILS ABSOLUTE: 2.9 K/UL (ref 1.7–7.7)
NEUTROPHILS RELATIVE PERCENT: 58.2 %
PDW BLD-RTO: 13.5 % (ref 12.4–15.4)
PLATELET # BLD: 304 K/UL (ref 135–450)
PMV BLD AUTO: 8.5 FL (ref 5–10.5)
RBC # BLD: 3.88 M/UL (ref 4–5.2)
WBC # BLD: 4.9 K/UL (ref 4–11)

## 2019-08-21 LAB
Lab: NORMAL
REPORT: NORMAL
THIS TEST SENT TO: NORMAL

## 2019-08-26 ENCOUNTER — OFFICE VISIT (OUTPATIENT)
Dept: ORTHOPEDIC SURGERY | Age: 44
End: 2019-08-26
Payer: COMMERCIAL

## 2019-08-26 DIAGNOSIS — M66.221 SPONTANEOUS RUPTURE OF EXTENSOR TENDON OF RIGHT UPPER ARM: ICD-10-CM

## 2019-08-26 DIAGNOSIS — M77.01 MEDIAL EPICONDYLITIS OF ELBOW, RIGHT: Primary | ICD-10-CM

## 2019-08-26 DIAGNOSIS — M77.11 LATERAL EPICONDYLITIS OF RIGHT ELBOW: ICD-10-CM

## 2019-08-26 DIAGNOSIS — M66.331 SPONTANEOUS RUPTURE OF FLEXOR TENDON OF RIGHT FOREARM: ICD-10-CM

## 2019-08-26 PROCEDURE — 99213 OFFICE O/P EST LOW 20 MIN: CPT | Performed by: INTERNAL MEDICINE

## 2019-08-27 NOTE — PROGRESS NOTES
malnutrition    Physical Exam: right elbow      Primary Exam:    Inspection: No deformity atrophy appreciable effusion      Palpation: There is mild tenderness of the lateral epicondyle and only minimal over the medial epicondyle      Range of Motion: Full range in flexion and extension with end range tightness in  with pain      Strength: Submaximal resisted minimally positive for pain extensor supinator and negative flexor pronator      Special Tests: Negative      Skin: There are no rashes, ulcerations or lesions. Gait: Nonantalgic      Neurovascular - non focal and intact       Additional Comments:        Additional Examinations:                Office Imaging Results/Procedures PerformedToday:             Office Procedures:   No orders of the defined types were placed in this encounter. Other Outside Imaging and Testing Personally Reviewed:    No results found. Assessment   Impression: . Encounter Diagnoses   Name Primary?  Medial epicondylitis of elbow, right Yes    Lateral epicondylitis of right elbow     Spontaneous rupture of extensor tendon of right upper arm     Spontaneous rupture of flexor tendon of right forearm               Plan: Activity modification-medial and lateral epicondylitis protocol  DASH and limited ultrasound on follow-up  Transition to nocturnal wrist splinting and PRN use with ADLs  Continue elbow range of motion exercises and common extensor common flexor tendon stretching  She can transition to resistive band strengthening exercises, flexor, extensor tendons no sooner than 2 weeks and PRN follow-up with  Hold NSAIDs for total 1 month post procedure. Use of Tylenol and local measures inclusive of cryotherapy         Orders:      No orders of the defined types were placed in this encounter. Magdalena May MD.      Disclaimer: \"This note was dictated with voice recognition software.  Though review and correction are

## 2019-09-24 ENCOUNTER — OFFICE VISIT (OUTPATIENT)
Dept: ORTHOPEDIC SURGERY | Age: 44
End: 2019-09-24
Payer: COMMERCIAL

## 2019-09-24 DIAGNOSIS — M66.331 SPONTANEOUS RUPTURE OF FLEXOR TENDON OF RIGHT FOREARM: ICD-10-CM

## 2019-09-24 DIAGNOSIS — M66.221 SPONTANEOUS RUPTURE OF EXTENSOR TENDON OF RIGHT UPPER ARM: ICD-10-CM

## 2019-09-24 DIAGNOSIS — M77.11 LATERAL EPICONDYLITIS OF RIGHT ELBOW: ICD-10-CM

## 2019-09-24 DIAGNOSIS — M77.01 MEDIAL EPICONDYLITIS OF ELBOW, RIGHT: ICD-10-CM

## 2019-09-24 DIAGNOSIS — M25.521 RIGHT ELBOW PAIN: Primary | ICD-10-CM

## 2019-09-24 PROCEDURE — 99213 OFFICE O/P EST LOW 20 MIN: CPT | Performed by: INTERNAL MEDICINE

## 2019-09-25 NOTE — PROGRESS NOTES
were no vitals filed for this visit. General Exam:     Constitutional: Patient is adequately groomed with no evidence of malnutrition    Physical Exam: right elbow      Primary Exam:    Inspection: No deformity atrophy or appreciable effusion      Palpation: There is negligible tenderness over the lateral epicondyle no focal tenderness over the medial epicondyle minimal tenderness over the cubital tunnel region      Range of Motion: Full range and symmetric at the elbow and wrist without pain      Strength; low-grade discomfort with supination and finger extension      Special Tests: Negative      Skin: There are no rashes, ulcerations or lesions. Gait: Nonantalgic     Neurovascular - non focal and intact       Additional Comments:        Additional Examinations:                   Office Imaging Results/Procedures PerformedToday:   Limited ultrasound evaluation-right elbow logically ultrasound-12 MHz    Patient was positioned supine on examination table with the right upper extremity supported. The common extensor tendon of the right elbow was evaluated extensively in long axis. The tendon was noted to be increased in caliber and the area of high-grade tendinopathy was identified in the interstitial/deep fiber region of the anterior segment. No evidence of interval progression of disease. Interstitial tear was demonstrated at the time of the injection at this anatomic location. With dynamic stressing there was no identifiable gapping of fibers sonographically    No other soft tissue abnormalities were identified. The common flexor tendon was then evaluated in long axis with the arm position overhead. Again noted was the area of tendinopathy change in the interstitial deep fiber region just distal to the osseous insertion. With dynamic stressing no appreciable gapping of fibers sonographically.   No other soft tissue or osseous of normalities     Office Procedures:     Orders Placed This Encounter

## 2019-12-09 ENCOUNTER — OFFICE VISIT (OUTPATIENT)
Dept: ORTHOPEDIC SURGERY | Age: 44
End: 2019-12-09
Payer: COMMERCIAL

## 2019-12-09 VITALS — HEIGHT: 66 IN | BODY MASS INDEX: 20.9 KG/M2 | WEIGHT: 130.07 LBS

## 2019-12-09 DIAGNOSIS — M77.01 MEDIAL EPICONDYLITIS OF ELBOW, RIGHT: ICD-10-CM

## 2019-12-09 DIAGNOSIS — M77.11 LATERAL EPICONDYLITIS OF RIGHT ELBOW: ICD-10-CM

## 2019-12-09 DIAGNOSIS — M25.521 RIGHT ELBOW PAIN: ICD-10-CM

## 2019-12-09 DIAGNOSIS — M66.221 SPONTANEOUS RUPTURE OF EXTENSOR TENDON OF RIGHT UPPER ARM: ICD-10-CM

## 2019-12-09 DIAGNOSIS — M66.331 SPONTANEOUS RUPTURE OF FLEXOR TENDON OF RIGHT FOREARM: ICD-10-CM

## 2019-12-09 PROCEDURE — 99213 OFFICE O/P EST LOW 20 MIN: CPT | Performed by: INTERNAL MEDICINE

## 2020-02-05 ENCOUNTER — OFFICE VISIT (OUTPATIENT)
Dept: ORTHOPEDIC SURGERY | Age: 45
End: 2020-02-05
Payer: COMMERCIAL

## 2020-02-05 VITALS — BODY MASS INDEX: 20.9 KG/M2 | HEIGHT: 66 IN | WEIGHT: 130.07 LBS

## 2020-02-05 PROCEDURE — 99214 OFFICE O/P EST MOD 30 MIN: CPT | Performed by: ORTHOPAEDIC SURGERY

## 2020-02-05 NOTE — PROGRESS NOTES
2/5/20  History of Present Illness:                             Maite Sawant is a 53 y.o. female with a chief complaint of right knee pain      Chief complaint presented in the office today: Right knee pain right knee    Location right knee  Severity moderate  Duration about 5 months  Associated sign/symptoms posterior lateral pain    I have reviewed and discussed the below Pain assessment findings with the patient. Medical History  Patient's medications, allergies, past medical, surgical, social and family histories were reviewed and updated as appropriate.     Past Medical History:   Diagnosis Date    ANTONIA positive     Endometriosis     Lupus (HCC)     minor,    Meniscus tear     left    Migraines     [place] - neurologist     PONV (postoperative nausea and vomiting)     very severe nausea & vomiting     Family History   Problem Relation Age of Onset    Heart Disease Mother     Diabetes Father      Social History     Socioeconomic History    Marital status:      Spouse name: Not on file    Number of children: Not on file    Years of education: Not on file    Highest education level: Not on file   Occupational History    Not on file   Social Needs    Financial resource strain: Not on file    Food insecurity:     Worry: Not on file     Inability: Not on file    Transportation needs:     Medical: Not on file     Non-medical: Not on file   Tobacco Use    Smoking status: Never Smoker    Smokeless tobacco: Never Used   Substance and Sexual Activity    Alcohol use: Yes     Comment: socially    Drug use: No    Sexual activity: Not on file   Lifestyle    Physical activity:     Days per week: Not on file     Minutes per session: Not on file    Stress: Not on file   Relationships    Social connections:     Talks on phone: Not on file     Gets together: Not on file     Attends Islam service: Not on file     Active member of club or organization: Not on file     Attends the right knee today, anterior posterior, lateral, notch view, skyline view:  Show no fracture no dislocation no signs of any significant arthritic wear, good joint space maintenance, no masses or tumors, no signs of any significant osteopenia, no obvious OCD lesions, no loose bodies seen. Impression no fracture no dislocation no signs of any arthritic wear  MRI: Ordered of the right knee today to rule out popliteus tendon tear versus lateral meniscus tear  Labs: None ordered      Past Surgical History:   Procedure Laterality Date     SECTION      x2    CHOLECYSTECTOMY      Laparoscopic    HYSTERECTOMY      KNEE ARTHROSCOPY Left     KNEE ARTHROSCOPY Left 03/10/2017    PARTIAL LATERAL MENISCECTOMY, SYNOVECTOMY    PELVIC LAPAROSCOPY      x 12 for endometriosis   . Office Procedures:  No orders of the defined types were placed in this encounter. Previous Treatments: Rest, ice, X-ray, anti-inflammatories,    Differential diagnosis: Medial meniscal tear, Lateral meniscal tear, Synovitis,  Loose body, stress fracture, patella femoral syndrome, osteoarthritis, chondral lesion, ACL tear, PCL injury, MCL or LCL injury, Capsular injury, infection, contusion, hip pathology, lumbar radiculopathy, Muscle injury, bone tumor, fracture, femoral acetabular osteoarthritis,    Diagnosis: Probable posterior horn lateral meniscus tear        Plan: (Medical Decision Making)    1. Medications -none  2. PT -none at this time  3. Further imaging -MRI  4. Follow up -after MRI    Fernanda Sahni. [employer]. [address] [place] [address] and Sports Medicine  Sports Fellowship Trained  Board Certified  [employer] Team Physician          Disclaimer: \"This note was dictated with voice recognition software. Though review and correction are routine, we apologize for any errors. \"

## 2020-02-12 ENCOUNTER — OFFICE VISIT (OUTPATIENT)
Dept: ORTHOPEDIC SURGERY | Age: 45
End: 2020-02-12
Payer: COMMERCIAL

## 2020-02-12 VITALS — WEIGHT: 130.07 LBS | HEIGHT: 66 IN | BODY MASS INDEX: 20.9 KG/M2

## 2020-02-12 PROCEDURE — 99214 OFFICE O/P EST MOD 30 MIN: CPT | Performed by: ORTHOPAEDIC SURGERY

## 2020-02-12 RX ORDER — PREDNISONE 10 MG/1
TABLET ORAL
Qty: 30 TABLET | Refills: 0 | Status: SHIPPED | OUTPATIENT
Start: 2020-02-12

## 2020-02-12 NOTE — PROGRESS NOTES
2/12/20  History of Present Illness:  Dick York is a 53 y.o. female    Chief complaint today in the office: Recheck evaluation right knee here today to discuss options    Location right knee   Severity moderate  Duration several months now  Associated sign/symptoms pain, posterior lateral joint line pain    Medical History  Patient's medications, allergies, past medical, surgical, social and family histories were reviewed and updated as appropriate. Review of Systems  No new rashes  No numbness  No tingling  No fever  No depression  No new pain pattern  Pertinent items are noted in HPI  Review of systems reviewed from Patient History Form dated on 2/5/2020 and available in the patient's chart under the Media tab. No change in the patients medical history form. Examination:  General Exam:    Vitals: Height 5' 5.98\" (1.676 m), weight 130 lb 1.1 oz (59 kg), not currently breastfeeding. Constitutional: Patient is adequately groomed with no evidence of malnutrition  Mental Status: The patient is alert and  oriented to time, place and person. The patient's mood and affect are appropriate. Lymphatic: The lymphatic examination bilaterally reveals all areas to be without enlargement or induration. Vascular: Examination reveals no swelling or calf tenderness. Peripheral pulses are palpable and 2+. Neurological: The patient has good coordination. There is no weakness or sensory deficit. Skin:    Head/Neck: inspection reveals no rashes, ulcerations or lesions. Trunk:  inspection reveals no rashes, ulcerations or lesions. Right Lower Extremity: inspection reveals no rashes, ulcerations or lesions. Left Lower Extremity: inspection reveals no rashes, ulcerations or lesions.                                           PHYSICAL EXAM:        Knee Examination  Inspection:  No abrasions no lacerations no signs of infection or obvious deformity mild obvious  swelling and joint effusion     Palpation: Palpation reveals no pain medial joint line,   Moderate lateral joint line pain, mild joint effusion    Range of Motion: 0-150 degrees flexion/ extension   Hip extension to 20 hip flexion to 70+  Lumbar ROM -20 extension flexion to 6 inches from the floor      Strength: Quadriceps testing 5/5 , hamstring muscle testing 5/5, EHL against resistance is 5/5, hip flexor strength is intact 5/5, internal and external rotation of the hip against resistance is also intact 5/5    Special Tests: stable Lachman, negative anterior drawer, negative pivot shift, no posterior sag no posterior drawer does not open to valgus or varus stress at 0 or 30°, Steinmann's as it of lateral, Samantha's positive lateral, Homans negative Alexandro negative, pedal pulses are +2/4 capillary refill is brisk sensation is intact ankle exam and hip exam are shows no pain with full range of motion provocative testing of the hip is nonpainful muscle testing around the hip is 5 over 5. Posterior lateral joint line pain to palpation  Lumbar flexion to 6 inches from floor with out pain      Inspection:      Gait: antalgic     Reflex:    Lower extremity Deep tendon reflexes +2/4 and equal bilaterally for patella and Achilles  Upper extremity reflexes:  of the biceps, triceps, brachioradialis +2/4 equal bilaterally    Contralateral  Knee: Negative Lachman negative anterior drawer negative pivot shift no posterior sag no posterior drawer does not open to valgus or varus stress at 0 or 30° negative Steinmann's negative Samantha's negative Homans negative Alexandro pedal pulses are +2/4 capillary refill is brisk sensation is intact ankle exam and hip exam are shows no pain with full range of motion provocative testing of the hip is nonpainful muscle testing around the hip is 5 over 5. Hip and lumbar testing does not reproduce pain evocative testing does not reproduce symptomatology.           Additional Examinations:  Right Upper Extremity:  Examination of the

## 2020-03-12 ENCOUNTER — OFFICE VISIT (OUTPATIENT)
Dept: ORTHOPEDIC SURGERY | Age: 45
End: 2020-03-12
Payer: COMMERCIAL

## 2020-03-12 VITALS — HEIGHT: 66 IN | BODY MASS INDEX: 20.9 KG/M2 | WEIGHT: 130.07 LBS

## 2020-03-12 PROCEDURE — 99213 OFFICE O/P EST LOW 20 MIN: CPT | Performed by: INTERNAL MEDICINE

## 2020-03-12 NOTE — PROGRESS NOTES
PO) Take by mouth      meloxicam (MOBIC) 15 MG tablet Take 1 tablet by mouth daily 30 tablet 3    rizatriptan (MAXALT) 10 MG tablet prn      OnabotulinumtoxinA (BOTOX) 200 UNITS SOLR Every 3 months       No current facility-administered medications for this visit. Allergies: Allergies   Allergen Reactions    Ultram [Tramadol Hcl] Itching           Review of Systems:    Pertinent items are noted in HPI          Vital Signs: There were no vitals filed for this visit. General Exam:     Constitutional: Patient is adequately groomed with no evidence of malnutrition    Physical Exam: right elbow      Primary Exam:    Inspection: No deformity atrophy appreciable effusion      Palpation: No focal tenderness of the medial or lateral epicondyle regions      Range of Motion: Full range and symmetric at the elbow and wrist      Strength: Normal flexor pronator and extensor supinator masses without pain      Special Tests: Modified liftoff negative      Skin: There are no rashes, ulcerations or lesions. Gait: Nonantalgic      Neurovascular - non focal and intact       Additional Comments:        Additional Examinations:                  Office Imaging Results/Procedures PerformedToday:     Limited ultrasound evaluation-right elbow  Logic E ultrasound  12 MHz    Patient position supine on examination table with the right upper extremity supported. The common extensor tendon was evaluated extensively in long axis. Overall fairly normal appearance of the common extensor tendon with only slight increase in caliber with mild tendinopathy in the deep fiber region of the middle segment region distribution of the tendon. With dynamic stressing no evidence of gapping tear. Power Doppler imaging negative    Radial collateral ligament complex was noted to be intact    The common flexor tendon was then evaluated over the medial epicondyle.   The common flexor tendon demonstrated only mild tendinopathy change in the interstitial deep fiber region just distal to the medial epicondyle. With dynamic imaging there was no evidence of gapping of fibers to suggest tear. Power Doppler imaging negative       Office Procedures:     Orders Placed This Encounter   Procedures    US EXTREMITY RIGHT NON VASC LIMITED     Standing Status:   Future     Number of Occurrences:   1     Standing Expiration Date:   3/12/2021     Order Specific Question:   Reason for exam:     Answer:   f/u PRP           Other Outside Imaging and Testing Personally Reviewed:    Us Extremity Right Non Vasc Limited    Result Date: 3/12/2020  Radiology result is complete; follow up with provider / physician office for radiology results              Assessment   Impression: . Encounter Diagnoses   Name Primary?  Lateral epicondylitis of right elbow Yes    Medial epicondylitis of elbow, right     Spontaneous rupture of extensor tendon of right upper arm     Spontaneous rupture of flexor tendon of right forearm         Status post type I PRP medial and lateral epicondyle-common extensor and common flexor tendons-right elbow August 19, 2019      Plan:     Full activities without formal restrictions, caution with respect to overuse  Continue maintenance I HEP 2-4 times per week for another 3 to 6 months  Repeat clinical evaluation and ultrasound evaluation PRN         Orders:        Orders Placed This Encounter   Procedures    US EXTREMITY RIGHT NON VASC LIMITED     Standing Status:   Future     Number of Occurrences:   1     Standing Expiration Date:   3/12/2021     Order Specific Question:   Reason for exam:     Answer:   f/u Calvin Baum MD.      Disclaimer: \"This note was dictated with voice recognition software. Though review and correction are routine, we apologize for any errors. \"

## 2020-05-28 ENCOUNTER — OFFICE VISIT (OUTPATIENT)
Dept: ORTHOPEDIC SURGERY | Age: 45
End: 2020-05-28
Payer: COMMERCIAL

## 2020-05-28 VITALS — HEIGHT: 66 IN | WEIGHT: 130.07 LBS | TEMPERATURE: 98.6 F | BODY MASS INDEX: 20.9 KG/M2

## 2020-05-28 PROCEDURE — 99214 OFFICE O/P EST MOD 30 MIN: CPT | Performed by: INTERNAL MEDICINE

## 2020-05-28 RX ORDER — KETOROLAC TROMETHAMINE 10 MG/1
10 TABLET, FILM COATED ORAL 3 TIMES DAILY
Qty: 15 TABLET | Refills: 0 | Status: SHIPPED | OUTPATIENT
Start: 2020-05-28 | End: 2021-09-08 | Stop reason: SDUPTHER

## 2020-05-28 NOTE — PROGRESS NOTES
Additional Examinations:           Left Upper Extremity: Examination of the left upper extremity does not show any tenderness, deformity or injury. Range of motion is unremarkable. There is no gross instability. There are no rashes, ulcerations or lesions. Strength and tone are normal.   Neurologic -Light touch sensation and manual muscle testing is normal C5-C8 . Biceps and triceps reflexes are symmetric and +2. Spurlling sign is negative         Office Imaging Results/Procedures PerformedToday:     Limited ultrasound evaluation of the right elbow  Logic E ultrasound  12 MHz    Patient position supine examination table with the right upper extremity supported. The common extensor tendon was evaluated extensively using linear transducer. There was an area of pathology localizing to the interstitial deep fiber distribution of the anterior segment of the common extensor tendon and this had a similar appearance to that from 3/12/2020. With dynamic stressing there is subtle gapping of fibers suggestive of tear    The radial collateral ligament complex was noted to be intact and stable with dynamic stressing    Overall caliber of the tendon increased consistent with hypertrophic tendinopathy change. Office Procedures:     Orders Placed This Encounter   Procedures    US EXTREMITY RIGHT NON VASC LIMITED     Standing Status:   Future     Standing Expiration Date:   5/28/2021     Order Specific Question:   Reason for exam:     Answer:   pain           Other Outside Imaging and Testing Personally Reviewed:    No results found. Assessment   Impression: . Encounter Diagnoses   Name Primary?  Lateral epicondylitis of right elbow Yes    Spontaneous rupture of extensor tendon of right upper arm       Acute on chronic injury        Plan:      Active modification lateral condyle-itis protocol  Compression force strapping and nocturnal splinting/responding and PRN use with ADLs  High-dose NSAIDs-Toradol with GI precaution  Repeat clinical evaluation in 3 weeks  Options to consider could include repeat biologic orthopedic injection and/or percutaneous tenotomy TX 2 microtip           Orders:        Orders Placed This Encounter   Procedures    US EXTREMITY RIGHT NON VASC LIMITED     Standing Status:   Future     Standing Expiration Date:   5/28/2021     Order Specific Question:   Reason for exam:     Answer:   pain         Zeferino Merrill MD.      Disclaimer: \"This note was dictated with voice recognition software. Though review and correction are routine, we apologize for any errors. \"

## 2020-06-18 ENCOUNTER — OFFICE VISIT (OUTPATIENT)
Dept: ORTHOPEDIC SURGERY | Age: 45
End: 2020-06-18
Payer: COMMERCIAL

## 2020-06-18 VITALS — HEIGHT: 66 IN | TEMPERATURE: 97 F | WEIGHT: 130.07 LBS | BODY MASS INDEX: 20.9 KG/M2

## 2020-06-18 PROCEDURE — 99213 OFFICE O/P EST LOW 20 MIN: CPT | Performed by: INTERNAL MEDICINE

## 2020-09-08 ENCOUNTER — OFFICE VISIT (OUTPATIENT)
Dept: ORTHOPEDIC SURGERY | Age: 45
End: 2020-09-08
Payer: COMMERCIAL

## 2020-09-08 VITALS — BODY MASS INDEX: 21.66 KG/M2 | HEIGHT: 65 IN | WEIGHT: 130 LBS | TEMPERATURE: 99 F

## 2020-09-08 PROCEDURE — 99213 OFFICE O/P EST LOW 20 MIN: CPT | Performed by: INTERNAL MEDICINE

## 2020-09-08 NOTE — PROGRESS NOTES
Chief Complaint:   Chief Complaint   Patient presents with    Elbow Pain     Right elbow pain           History of Present Illness:       Patient is a 53 y.o. female returns follow up for the above complaint. The patient was last seen approximately 2 monthsago. The symptoms show no change since the last visit. The patient has had no further testing for the problem. She continues to experience pain in the proximal extensor forearm. No new injuries no new events  Despite maintenance HEP no appreciable change in symptoms    She will consider other options for treatment elbow pain 4-5/10 severity    There is no neuritic component of pain. She denies any new onset or progressive weakness of the upper extremity or correlation with head neck range of motion       Past Medical History:        Past Medical History:   Diagnosis Date    ANTONIA positive     Endometriosis     Lupus (HCC)     minor,    Meniscus tear     left    Migraines     [place] - neurologist     PONV (postoperative nausea and vomiting)     very severe nausea & vomiting        Present Medications:         Current Outpatient Medications   Medication Sig Dispense Refill    ketorolac (TORADOL) 10 MG tablet Take 1 tablet by mouth three times daily Discontinue all other NSAIDs during the course of treatment and resumed thereafter 15 tablet 0    predniSONE (DELTASONE) 10 MG tablet Days1-2:50mg PO QD,Days3-4:40mg PO QD,Days5-6:30mg PO QD,Days7-8:20mg PO QD,Days 9-10:10mg PO QD 30 tablet 0    azithromycin (ZITHROMAX) 250 MG tablet       HYDROcodone-acetaminophen (NORCO) 5-325 MG per tablet .       topiramate (TOPAMAX) 200 MG tablet Take 100 mg by mouth 2 times daily      Prenatal Multivit-Min-Fe-FA (PRE-VILLA PO) Take by mouth      meloxicam (MOBIC) 15 MG tablet Take 1 tablet by mouth daily 30 tablet 3    rizatriptan (MAXALT) 10 MG tablet prn      OnabotulinumtoxinA (BOTOX) 200 UNITS SOLR Every 3 months       No current facility-administered medications for this visit. Allergies: Allergies   Allergen Reactions    Ultram [Tramadol Hcl] Itching           Review of Systems:    Pertinent items are noted in HPI         Vital Signs:      Vitals:    09/08/20 1639   Temp: 99 °F (37.2 °C)        General Exam:     Constitutional: Patient is adequately groomed with no evidence of malnutrition    Physical Exam: right elbow      Primary Exam:    Inspection: No deformity atrophy appreciable effusion      Palpation: There is focal tenderness of the radial tunnel reproducing her pain only low-grade discomfort over the lateral condyle      Range of Motion: Full range and symmetric at the elbow      Strength: Normal at the wrist and digits      Special Tests: Negative      Skin: There are no rashes, ulcerations or lesions. Gait: Nonantalgic      Neurovascular - non focal and intact       Additional Comments:        Additional Examinations:                  Office Imaging Results/Procedures PerformedToday:          Office Procedures:     Orders Placed This Encounter   Procedures    US EXTREMITY RIGHT NON VASC LIMITED     This procedure can be scheduled via [employer]. Access your [employer] account by visiting [email or web address]. Standing Status:   Future     Number of Occurrences:   1     Standing Expiration Date:   9/8/2021           Other Outside Imaging and Testing Personally Reviewed:    Us Extremity Right Non Vasc Limited    Result Date: 9/8/2020  Radiology result is complete; follow up with provider / physician office for radiology results      Limited ultrasound evaluation-right elbow  Logic E ultrasound  12 MHz    Patient position supine examination table with the elbow fully extended and supinated. The radial tunnel was evaluated and the radial nerve was evaluated proximal to the radiocapitellar joint and to its division into superficial and deep branches at the level of the supinator.   No evidence of extrinsic compression no soft tissue mass or cystic mass identified within the forearm in this anatomic region. The nerve was evaluated in short axis using lift technique and evaluated in long axis. There was tenderness to sono palpation in the region of the radial tunnel reproducing her pain      Assessment   Impression: . Encounter Diagnoses   Name Primary?  Radial tunnel syndrome, right     Lateral epicondylitis of right elbow     Spontaneous rupture of extensor tendon of right upper arm     Right elbow pain           Status post type I PRP medial and lateral epicondyle-common extensor and common flexor tendons-right elbow August 19, 2019    Plan:     Consider ultrasound-guided radial nerve [place] dissection PRN she will contact us to schedule  Activity modification-lower epicondylitis protocol as per previous  Nocturnal wrist splinting as needed    Overall I believe there are 2 pain generators for her lateral wall pain and I believe the radial tunnel syndrome is the primary pain generator at this time. Proceed as outlined above         Orders:        Orders Placed This Encounter   Procedures    US EXTREMITY RIGHT NON VASC LIMITED     This procedure can be scheduled via [employer]. Access your [employer] account by visiting [email or web address]. Standing Status:   Future     Number of Occurrences:   1     Standing Expiration Date:   9/8/2021         Minor Ingram MD.      Disclaimer: \"This note was dictated with voice recognition software. Though review and correction are routine, we apologize for any errors. \"

## 2020-09-15 ENCOUNTER — OFFICE VISIT (OUTPATIENT)
Dept: ORTHOPEDIC SURGERY | Age: 45
End: 2020-09-15
Payer: COMMERCIAL

## 2020-09-15 VITALS — TEMPERATURE: 98.4 F | BODY MASS INDEX: 21.67 KG/M2 | WEIGHT: 130.07 LBS | HEIGHT: 65 IN

## 2020-09-15 PROCEDURE — 64450 NJX AA&/STRD OTHER PN/BRANCH: CPT | Performed by: INTERNAL MEDICINE

## 2020-09-15 RX ORDER — LIDOCAINE HYDROCHLORIDE 10 MG/ML
5 INJECTION, SOLUTION INFILTRATION; PERINEURAL ONCE
Status: COMPLETED | OUTPATIENT
Start: 2020-09-15 | End: 2020-09-15

## 2020-09-15 RX ORDER — BUPIVACAINE HYDROCHLORIDE 2.5 MG/ML
3 INJECTION, SOLUTION INFILTRATION; PERINEURAL ONCE
Status: COMPLETED | OUTPATIENT
Start: 2020-09-15 | End: 2020-09-15

## 2020-09-15 RX ADMIN — BUPIVACAINE HYDROCHLORIDE 7.5 MG: 2.5 INJECTION, SOLUTION INFILTRATION; PERINEURAL at 17:32

## 2020-09-15 RX ADMIN — LIDOCAINE HYDROCHLORIDE 5 ML: 10 INJECTION, SOLUTION INFILTRATION; PERINEURAL at 17:33

## 2020-09-15 NOTE — PROGRESS NOTES
9/15/20 5:33 PM    Dextrose Injection 5%      NDC: 2882-6541-45    Lot Number: [id number]    Body Part: right elbow

## 2020-09-16 NOTE — PROGRESS NOTES
Chief Complaint:   Chief Complaint   Patient presents with    Elbow Pain     right, pain about the same, worse with repetitive work activities          History of Present Illness:       Patient is a 53 y.o. female returns follow up for the above complaint. The patient was last seen approximately 8 daysago. The symptoms show no change since the last visit. The patient has had no further testing for the problem. She would like to proceed with radial nerve hydrodissection as previously discussed    She continues to note symptoms in the proximal extensor forearm     Past Medical History:        Past Medical History:   Diagnosis Date    ANTONIA positive     Endometriosis     Lupus ([facility] [address]     minor,    Meniscus tear     left    Migraines     [place] - neurologist     PONV (postoperative nausea and vomiting)     very severe nausea & vomiting        Present Medications:         Current Outpatient Medications   Medication Sig Dispense Refill    ketorolac (TORADOL) 10 MG tablet Take 1 tablet by mouth three times daily Discontinue all other NSAIDs during the course of treatment and resumed thereafter 15 tablet 0    predniSONE (DELTASONE) 10 MG tablet Days1-2:50mg PO QD,Days3-4:40mg PO QD,Days5-6:30mg PO QD,Days7-8:20mg PO QD,Days 9-10:10mg PO QD 30 tablet 0    azithromycin (ZITHROMAX) 250 MG tablet       HYDROcodone-acetaminophen (NORCO) 5-325 MG per tablet .  topiramate (TOPAMAX) 200 MG tablet Take 100 mg by mouth 2 times daily      Prenatal Multivit-Min-Fe-FA (PRE-VILLA PO) Take by mouth      meloxicam (MOBIC) 15 MG tablet Take 1 tablet by mouth daily 30 tablet 3    rizatriptan (MAXALT) 10 MG tablet prn      OnabotulinumtoxinA (BOTOX) 200 UNITS SOLR Every 3 months       No current facility-administered medications for this visit. Allergies:         Allergies   Allergen Reactions    Ultram [Tramadol Hcl] Itching           Review of Systems:    Pertinent items are noted in HPI      Vital cc was injected at this anatomic location. The nerve was then evaluated using lift technique distally and just proximal to its bifurcation. At this anatomic level [place] dissection was repeated with approximately 5 to 6 cc of 10-1 mixture of dextrose and 0.25% Marcaine. The hydrodissection was performed circumferentially under direct guidance using longitudinal technique. Patient tolerated this with negligible discomfort    At least partial anesthetic response post procedure and minimal radial nerve palsy as expected. Technically successful radial nerve hydrodissection ultrasound-guided      Other Outside Imaging and Testing Personally Reviewed:    Us Guided Needle Placement    Result Date: 9/16/2020  Radiology result is complete; follow up with provider / physician office for radiology results              Assessment   Impression: . Encounter Diagnoses   Name Primary?  Radial tunnel syndrome, right Yes    Right elbow pain               Plan: Ace compression for the next few hours and PRN thereafter  Active modification-caution against overuse  Clinical follow-up in 3 weeks         Orders:        Orders Placed This Encounter   Procedures    US GUIDED NEEDLE PLACEMENT     Standing Status:   Future     Number of Occurrences:   1     Standing Expiration Date:   9/15/2021     Order Specific Question:   Reason for exam:     Answer:   nerve hydrodissection    IA INJECTION AA&/STRD OTHER PERIPHERAL Alan Jones MD.      Disclaimer: \"This note was dictated with voice recognition software. Though review and correction are routine, we apologize for any errors. \"

## 2020-10-06 ENCOUNTER — OFFICE VISIT (OUTPATIENT)
Dept: ORTHOPEDIC SURGERY | Age: 45
End: 2020-10-06
Payer: COMMERCIAL

## 2020-10-06 VITALS — BODY MASS INDEX: 21.67 KG/M2 | TEMPERATURE: 98.4 F | WEIGHT: 130.07 LBS | HEIGHT: 65 IN

## 2020-10-06 PROCEDURE — 99213 OFFICE O/P EST LOW 20 MIN: CPT | Performed by: INTERNAL MEDICINE

## 2020-10-06 NOTE — PROGRESS NOTES
Chief Complaint:   Chief Complaint   Patient presents with    Elbow Pain     right, pain increased, ache/sore constant 7-8/10, med/lat elbow, dropping objects, weak           History of Present Illness:       Patient is a 53 y.o. female returns follow up for the above complaint. The patient was last seen approximately 3 weeksago. The symptoms are improving since the last visit. The patient has had no further testing for the problem. 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 No new injuries no new events         Past Medical History:        Past Medical History:   Diagnosis Date    ANTONIA positive     Endometriosis     Lupus (HCC)     minor,    Meniscus tear     left    [facility]     [facility]     PONV (postoperative nausea and vomiting)     very severe nausea & vomiting        Present Medications:         Current Outpatient Medications   Medication Sig Dispense Refill    ketorolac (TORADOL) 10 MG tablet Take 1 tablet by mouth three times daily Discontinue all other NSAIDs during the course of treatment and resumed thereafter 15 tablet 0    predniSONE (DELTASONE) 10 MG tablet Days1-2:50mg PO QD,Days3-4:40mg PO QD,Days5-6:30mg PO QD,Days7-8:20mg PO QD,Days 9-10:10mg PO QD 30 tablet 0    azithromycin (ZITHROMAX) 250 MG tablet       HYDROcodone-acetaminophen (NORCO) 5-325 MG per tablet .       topiramate (TOPAMAX) 200 MG tablet Take 100 mg by mouth 2 times daily      Prenatal Multivit-Min-Fe-FA (PRE- PO) Take by mouth      meloxicam (MOBIC) 15 MG tablet Take 1 tablet by mouth daily 30 tablet 3    rizatriptan (MAXALT) 10 MG tablet prn      OnabotulinumtoxinA (BOTOX) 200 UNITS SOLR Every 3 months       No current facility-administered medications for this visit. Allergies: Allergies   Allergen Reactions    Ultram [Tramadol Hcl] Itching           Review of Systems:    Pertinent items are noted in HPI        Vital Signs:      Vitals:    10/06/20 1041   Temp: 98.4 °F (36.9 °C)        General Exam:     Constitutional: Patient is adequately groomed with no evidence of malnutrition    Physical Exam: right elbow      Primary Exam:    Inspection: No deformity atrophy appreciable effusion      Palpation: There is tenderness of the medial lateral condyle regions and only minimal tenderness over the radial tunnel region      Range of Motion: Full range and symmetric at the elbow and wrist      Strength: Near normal only low-grade resisted pain with lateral medial epicondyle provocative maneuvers      Special Tests: Negative wrist drop      Skin: There are no rashes, ulcerations or lesions. Gait: Nonantalgic      Neurovascular - non focal and intact       Additional Comments:        Additional Examinations:               Office Imaging Results/Procedures PerformedToday:          Office Procedures:     Orders Placed This Encounter   Procedures    US EXTREMITY RIGHT NON VASC LIMITED     Standing Status:   Future     Number of Occurrences:   1     Standing Expiration Date:   10/6/2021     Order Specific Question:   Reason for exam:     Answer:   dx     Limited ultrasound evaluation right elbow, extensor common flexor tendons lateral and medial epicondyle respectively  Logic E ultrasound 12 MHz    Patient was position supine examination table right upper extremity was evaluated. The common extensor tendon was evaluated extensively using linear transducer and long axis. at her convenience and proceed as outlined above         Orders:        Orders Placed This Encounter   Procedures    US EXTREMITY RIGHT NON VASC LIMITED     Standing Status:   Future     Number of Occurrences:   1     Standing Expiration Date:   10/6/2021     Order Specific Question:   Reason for exam:     Answer:   meera Joy MD.      Disclaimer: \"This note was dictated with voice recognition software. Though review and correction are routine, we apologize for any errors. \"

## 2020-12-11 ENCOUNTER — TELEPHONE (OUTPATIENT)
Dept: ORTHOPEDIC SURGERY | Age: 45
End: 2020-12-11

## 2020-12-29 ENCOUNTER — TELEPHONE (OUTPATIENT)
Dept: ORTHOPEDIC SURGERY | Age: 45
End: 2020-12-29

## 2020-12-29 NOTE — TELEPHONE ENCOUNTER
General Question     Subject: CANCEL PRP PROCEDURE  Patient and /or Facility Request: Bee Gins Number: [phone]

## 2021-01-20 ENCOUNTER — OFFICE VISIT (OUTPATIENT)
Dept: ORTHOPEDIC SURGERY | Age: 46
End: 2021-01-20
Payer: COMMERCIAL

## 2021-01-20 VITALS — TEMPERATURE: 97.1 F | BODY MASS INDEX: 21.67 KG/M2 | WEIGHT: 130.07 LBS | HEIGHT: 65 IN

## 2021-01-20 DIAGNOSIS — M77.11 LATERAL EPICONDYLITIS OF RIGHT ELBOW: Primary | ICD-10-CM

## 2021-01-20 DIAGNOSIS — M77.01 MEDIAL EPICONDYLITIS OF ELBOW, RIGHT: ICD-10-CM

## 2021-01-20 DIAGNOSIS — M67.921 TENDINOPATHY OF ELBOW, RIGHT: ICD-10-CM

## 2021-01-20 PROCEDURE — 99213 OFFICE O/P EST LOW 20 MIN: CPT | Performed by: INTERNAL MEDICINE

## 2021-01-20 NOTE — PROGRESS NOTES
Chief Complaint:   Chief Complaint   Patient presents with    Elbow Pain     right, doing \"terrible\", pain 8/10 at rest, 10/10 with palpation          History of Present Illness:       Patient is a 50 y.o. female returns follow up for the above complaint. The patient was last seen approximately 3 monthsago. The symptoms are worsening since the last visit. The patient has had no further testing for the problem. Her lateral elbow pain remains problematic she would like to proceed with second treatment of PRP-type I PRP Arthrex Jake. Status post type I PRP threats Jake injection-lateral and medial epicondyle common extensor and flexor tendons right elbow 8/19/2019    Majority of her pain over the lateral condyle and proximal extensor forearm mass she only has mild discomfort over the medial epicondyle region    She notes tenderness and hypersensitivity over the extensor forearm and lateral condyle region. 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        Past Medical History:        Past Medical History:   Diagnosis Date    ANTONIA positive     Endometriosis     Lupus ([facility] [address]     minor,    Meniscus tear     left    Migraines     [place] - neurologist     PONV (postoperative nausea and vomiting)     very severe nausea & vomiting        Present Medications:         Current Outpatient Medications   Medication Sig Dispense Refill    ketorolac (TORADOL) 10 MG tablet Take 1 tablet by mouth three times daily Discontinue all other NSAIDs during the course of treatment and resumed thereafter 15 tablet 0    predniSONE (DELTASONE) 10 MG tablet Days1-2:50mg PO QD,Days3-4:40mg PO QD,Days5-6:30mg ultrasound 12 MHz    Patient positioned supine on examination table with the right upper extremity supported. Linear transducer was utilized and the common extensor tendon was evaluated extensively in long axis. Overall findings consistent with hypertrophic tendinopathy with an area of discrete high-grade tendinopathy in the interstitial/deep fiber region in the anterior segment region of the tendon and juxtaposed to the epicondylar ridge    With dynamic stressing there is no convincing evidence of gapping of fibers to suggest discrete tear. The underlying radial collateral ligament complex at this anatomic region is noted to be intact with dynamic stressing. There is very mild decreased echogenicity and mild increase in caliber in the origin fibers of the ligament consistent with low-grade degenerative change. There is no evidence of intra-articular effusion at the radiocapitellar joint. Power Doppler imaging negative    The flexor tendon was then evaluated about the medial epicondyle. The arm was positioned overhead medial aspect of the elbow was accessible for evaluation. Linear transducer was utilized and the common flexor tendon was evaluated extensively in long axis. Overall caliber of the tendon was normal but an area of discrete mild to moderate tendinopathy change interstitial deep fiber region involving the central portion of the tendon. With dynamic stressing no evidence of gapping of fibers to suggest discrete tear    The ulnar collateral ligament was noted to be intact with mild increase in caliber consistent with strain. The ulnar nerve was evaluated at the cubital tunnel and both proximal and distal to this anatomic location and was noted to have normal sonographic echotexture. No soft tissue lesions or cystic changes      Other Outside Imaging and Testing Personally Reviewed:    No results found. Assessment   Impression: . Encounter Diagnoses   Name Primary?     Lateral epicondylitis of right elbow Yes    Medial epicondylitis of elbow, right     Tendinopathy of elbow, right         Status post type I PRP threats Jake injection-lateral and medial epicondyle common extensor and flexor tendons right elbow 8/19/2019    Plan:     Recommend proceed with biologic orthopedic injection-type I PRP-Arthrex Jake common extensor tendon and common flexor tendon lateral to medial epicondyle regions right elbow  Continue compression for strapping and nocturnal wrist splinting and as needed use of her splinting with ADLs  Topical NSAIDs-Voltaren gel and local measures  Continue maintenance I HEP    She is not interested in alternatives to treatment which were discussed that could include percutaneous tenotomy TX 2 microtip. Overall she demonstrates partial but incomplete healing of the common extensor tendon pathology. Proceed as outlined above         Orders:        Orders Placed This Encounter   Procedures    US EXTREMITY RIGHT NON VASC LIMITED     Standing Status:   Future     Number of Occurrences:   1     Standing Expiration Date:   1/20/2022     Order Specific Question:   Reason for exam:     Answer:   meera Leroy MD.      Disclaimer: \"This note was dictated with voice recognition software. Though review and correction are routine, we apologize for any errors. \"

## 2021-02-17 ENCOUNTER — TELEPHONE (OUTPATIENT)
Dept: ORTHOPEDIC SURGERY | Age: 46
End: 2021-02-17

## 2021-04-29 ENCOUNTER — OFFICE VISIT (OUTPATIENT)
Dept: ORTHOPEDIC SURGERY | Age: 46
End: 2021-04-29
Payer: COMMERCIAL

## 2021-04-29 VITALS — BODY MASS INDEX: 21.67 KG/M2 | WEIGHT: 130.07 LBS | HEIGHT: 65 IN

## 2021-04-29 DIAGNOSIS — G56.21 NEURITIS OF RIGHT ULNAR NERVE: ICD-10-CM

## 2021-04-29 DIAGNOSIS — M77.11 LATERAL EPICONDYLITIS OF RIGHT ELBOW: ICD-10-CM

## 2021-04-29 DIAGNOSIS — M77.01 MEDIAL EPICONDYLITIS OF ELBOW, RIGHT: ICD-10-CM

## 2021-04-29 DIAGNOSIS — M66.221 SPONTANEOUS RUPTURE OF EXTENSOR TENDON OF RIGHT UPPER ARM: Primary | ICD-10-CM

## 2021-04-29 DIAGNOSIS — M66.331 SPONTANEOUS RUPTURE OF FLEXOR TENDON OF RIGHT FOREARM: ICD-10-CM

## 2021-04-29 PROCEDURE — L3660 SO 8 AB RSTR CAN/WEB PRE OTS: HCPCS | Performed by: INTERNAL MEDICINE

## 2021-04-29 PROCEDURE — 0232T NJX PLATELET PLASMA: CPT | Performed by: INTERNAL MEDICINE

## 2021-04-29 RX ORDER — HYDROCODONE BITARTRATE AND ACETAMINOPHEN 5; 325 MG/1; MG/1
1 TABLET ORAL EVERY 6 HOURS PRN
Qty: 15 TABLET | Refills: 0 | Status: SHIPPED | OUTPATIENT
Start: 2021-04-29 | End: 2021-05-04

## 2021-04-29 RX ORDER — HYDROCODONE BITARTRATE AND ACETAMINOPHEN 5; 325 MG/1; MG/1
1 TABLET ORAL EVERY 6 HOURS PRN
Qty: 15 TABLET | Refills: 0 | Status: SHIPPED | OUTPATIENT
Start: 2021-04-29 | End: 2021-04-29 | Stop reason: SDUPTHER

## 2021-04-29 RX ORDER — LIDOCAINE HYDROCHLORIDE 10 MG/ML
13 INJECTION, SOLUTION INFILTRATION; PERINEURAL ONCE
Status: COMPLETED | OUTPATIENT
Start: 2021-04-29 | End: 2021-04-29

## 2021-04-29 RX ADMIN — LIDOCAINE HYDROCHLORIDE 13 ML: 10 INJECTION, SOLUTION INFILTRATION; PERINEURAL at 17:18

## 2021-04-29 NOTE — PROGRESS NOTES
Chief Complaint:   Chief Complaint   Patient presents with    Elbow Pain     right, pain has gotten worse with use/work, difficulty writing, pain 7-8/10          History of Present Illness:       Patient is a 50 y.o. female returns follow up for the above complaint. The patient was last seen approximately 3 monthsago. The symptoms show no change since the last visit. The patient has had no further testing for the problem. Symptoms remain problematic pain localized to the lateral epicondyle and medial epicondyle regions more prominent laterally than medially. Superimposed on this she is experiencing some sensitivity about the medial aspect of the elbow in the region of the cubital tunnel. She is occasionally variance neuritic symptoms in the ulnar nerve distribution of the right hand. 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 She initially demonstrated clinical improvement with this initial treatment but symptoms gradually worsened and more notably over the lateral than the medial epicondyle regions previously treated. Serial ultrasound evaluation has demonstrated partial healing of the common extensor and flexor tendons related to the initial treatment. Past Medical History:        Past Medical History:   Diagnosis Date    ANTONIA positive     Endometriosis     Lupus ([facility] [address]     minor,    Meniscus tear     left    Migraines     [place] - neurologist     PONV (postoperative nausea and vomiting)     very severe nausea & vomiting        Present Medications:         Current Outpatient Medications   Medication Sig Dispense Refill    HYDROcodone-acetaminophen (NORCO) 5-325 MG per tablet Take 1 tablet by mouth every 6 hours as needed for Pain for up to 5 days.  Take lowest dose possible to manage pain 15 tablet 0    ketorolac (TORADOL) 10 MG tablet Take 1 tablet by mouth three times daily Discontinue all other NSAIDs during the course of treatment and resumed thereafter 15 tablet 0    predniSONE (DELTASONE) 10 MG tablet Days1-2:50mg PO QD,Days3-4:40mg PO QD,Days5-6:30mg PO QD,Days7-8:20mg PO QD,Days 9-10:10mg PO QD 30 tablet 0    azithromycin (ZITHROMAX) 250 MG tablet       topiramate (TOPAMAX) 200 MG tablet Take 100 mg by mouth 2 times daily      Prenatal Multivit-Min-Fe-FA (PRE-VILLA PO) Take by mouth      meloxicam (MOBIC) 15 MG tablet Take 1 tablet by mouth daily 30 tablet 3    rizatriptan (MAXALT) 10 MG tablet prn      OnabotulinumtoxinA (BOTOX) 200 UNITS SOLR Every 3 months       No current facility-administered medications for this visit. Allergies: Allergies   Allergen Reactions    Ultram [Tramadol Hcl] Itching           Review of Systems:    Pertinent items are noted in HPI        Vital Signs: There were no vitals filed for this visit. General Exam:     Constitutional: Patient is adequately groomed with no evidence of malnutrition    Physical Exam: right elbow      Primary Exam:    Inspection: No deformity atrophy appreciable effusion      Palpation: Focal tenderness of the medial lateral epicondyle regions percussion tenderness of the cubital tunnel      Range of Motion: Full range and symmetric      Strength: Normal pain with finger extension reproducing her lateral elbow pain      Special Tests: Tinel's minimally positive for pain at the elbow      Skin: There are no rashes, ulcerations or lesions.       Gait: Nonantalgic    Neurovascular - non focal and intact       Additional Comments:        Additional Examinations:                   Office Imaging Results/Procedures PerformedToday:         Office Procedures:     Orders Placed This Encounter   Procedures    US MISC LIMITED     Order Specific Question:   Reason for exam:     Answer:   PRP    advanced into the substance of the tendon needle tip directed to the central area of pathology aforementioned. PRP was injected and visualized to hydrodissect within the deep fiber region distally and within the deep fiber myotendinous junction. Approximately 1.5 to 2 cc of PRP was injected in this anatomic location. Patient tolerated this with low-grade discomfort    Band-Aid to seal puncture wound Ace wrap compression and sling immobilization and wrist splint immobilization. Other Outside Imaging and Testing Personally Reviewed:    No results found. Assessment   Impression: . Encounter Diagnoses   Name Primary?  Spontaneous rupture of extensor tendon of right upper arm Yes    Lateral epicondylitis of right elbow     Spontaneous rupture of flexor tendon of right forearm     Medial epicondylitis of elbow, right     Neuritis of right ulnar nerve               Plan:     Sling immobilization for comfort for the next 2 to 3 days as needed thereafter, wrist splint immobilization for the next 10 days continues transition to nocturnal splinting thereafter  She can start flexor forearm and extensor forearm static stretching and table slides starting tomorrow as tolerated  Strict avoidance of NSAIDs for the next 3 weeks. Use of hydrocodone short-term minimize use of narcotic analgesia  Clinical evaluation and work-up of cubital tunnel syndrome as needed consider ulnar nerve hydrodissection at the elbow. Orders:        Orders Placed This Encounter   Procedures    US MISC LIMITED     Order Specific Question:   Reason for exam:     Answer:   PRP    Breg DLX Shoulder Immobilizer     Patient was prescribed a DLX Shoulder Immobilizer. The right shoulder will require stabilization / immobilization from this orthosis. The orthosis will assist in protecting the affected area, provide functional support and facilitate healing.     The patient was educated and fit by a healthcare professional with expert knowledge and specialization in brace application while under the direct supervision of the treating physician. Verbal and written instructions for the use of and application of this item were provided. They were instructed to contact the office immediately should the brace result in increased pain, decreased sensation, increased swelling or worsening of the condition. Diandra Tellez MD.      Disclaimer: \"This note was dictated with voice recognition software. Though review and correction are routine, we apologize for any errors. \"

## 2021-05-10 ENCOUNTER — OFFICE VISIT (OUTPATIENT)
Dept: ORTHOPEDIC SURGERY | Age: 46
End: 2021-05-10
Payer: COMMERCIAL

## 2021-05-10 VITALS — BODY MASS INDEX: 21.67 KG/M2 | HEIGHT: 65 IN | WEIGHT: 130.07 LBS

## 2021-05-10 DIAGNOSIS — M77.01 MEDIAL EPICONDYLITIS OF ELBOW, RIGHT: ICD-10-CM

## 2021-05-10 DIAGNOSIS — M66.221 SPONTANEOUS RUPTURE OF EXTENSOR TENDON OF RIGHT UPPER ARM: ICD-10-CM

## 2021-05-10 DIAGNOSIS — S43.432A GLENOID LABRAL TEAR, LEFT, INITIAL ENCOUNTER: Primary | ICD-10-CM

## 2021-05-10 DIAGNOSIS — M66.331 SPONTANEOUS RUPTURE OF FLEXOR TENDON OF RIGHT FOREARM: ICD-10-CM

## 2021-05-10 DIAGNOSIS — M77.11 LATERAL EPICONDYLITIS OF RIGHT ELBOW: ICD-10-CM

## 2021-05-10 DIAGNOSIS — M25.512 LEFT SHOULDER PAIN, UNSPECIFIED CHRONICITY: ICD-10-CM

## 2021-05-10 PROCEDURE — 99214 OFFICE O/P EST MOD 30 MIN: CPT | Performed by: INTERNAL MEDICINE

## 2021-05-10 NOTE — PROGRESS NOTES
Chief Complaint:   Chief Complaint   Patient presents with    Elbow Pain     right, f/u PRP 4/29/21, a little better, still sore to the touch, compliant w/brace and heat, pain 5-6/10    Shoulder Pain     left, has been painful with more use lately d/t R elbow          History of Present Illness:       Patient is a 50 y.o. female returns follow up for the above complaint. The patient was last seen approximately 10 daysago. The symptoms are stable since the last visit with respect to the right elbow. There are not associated symptoms new onset neuritic symptoms that have developed since the last visit. The patient has had no further testing for the problem. She has full range of motion about the elbow and although she has returned to work prematurely her symptoms have not escalated. Status post ultrasound-guided type I PRP injection-right common extensor tendon right elbow and right common flexor tendon left elbow-lateral epicondyle and medial epicondyle respectively-right 4/29/2021-repeat biologic intervention    Her main complaint relates to left shoulder pain today which has been increasingly problematic over the past 6 to 8 weeks approximately specific injury or event    The symptoms do not show a typical rotator cuff provacative pattern. The pain is lateral and posterior about the shoulder. There is not associated mechanical symptoms localizing to the shoulder. The patient denies symptoms of instability about the shoulder. Treatment to date has included nothing specific. Pain levels 7. The symptoms do not correlate with head and neck movement. The patient denies new onset weakness of the left upper extremity. The patient does not have history of prior shoulder trauma. There is no history or autoimmune disease, inflammatory arthropathy or crystal arthropathy.          Past Medical History:        Past Medical History:   Diagnosis Date    ANTONIA positive     Endometriosis     Lupus ([facility] [address] minor,    Meniscus tear     left    Migraines     [place] - neurologist     PONV (postoperative nausea and vomiting)     very severe nausea & vomiting        Present Medications:         Current Outpatient Medications   Medication Sig Dispense Refill    ketorolac (TORADOL) 10 MG tablet Take 1 tablet by mouth three times daily Discontinue all other NSAIDs during the course of treatment and resumed thereafter 15 tablet 0    predniSONE (DELTASONE) 10 MG tablet Days1-2:50mg PO QD,Days3-4:40mg PO QD,Days5-6:30mg PO QD,Days7-8:20mg PO QD,Days 9-10:10mg PO QD 30 tablet 0    azithromycin (ZITHROMAX) 250 MG tablet       topiramate (TOPAMAX) 200 MG tablet Take 100 mg by mouth 2 times daily      Prenatal Multivit-Min-Fe-FA (PRE-VILLA PO) Take by mouth      meloxicam (MOBIC) 15 MG tablet Take 1 tablet by mouth daily 30 tablet 3    rizatriptan (MAXALT) 10 MG tablet prn      OnabotulinumtoxinA (BOTOX) 200 UNITS SOLR Every 3 months       No current facility-administered medications for this visit. Allergies: Allergies   Allergen Reactions    Ultram [Tramadol Hcl] Itching        Review of Symptoms:    Pertinent items are noted in HPI   10 point review of systems negative except as mentioned in HPI       Vital Signs: There were no vitals filed for this visit. General Exam:     Constitutional: Patient is adequately groomed with no evidence of malnutrition  Mental Status: The patient is oriented to time, place and person. The patient's mood and affect are appropriate. Vascular: Examination reveals no swelling or calf tenderness. Peripheral pulses are palpable and 2+.    Lymphatics: no lymphadenopathy of the cervical or axillary regions or upper extremity      Physical Exam: left shoulder      Primary Exam:    Inspection: No deformity atrophy appreciable effusion      Palpation: No focal tenderness      Range of Motion: Full range symmetric pain in extreme forward elevation and abduction symmetric in all ranges      Strength: Normal without pain      Special Tests: Dynamic external rotation shear test positive and reproduces her pain, [place]'s test equivocal, impingement sign negative proximal biceps signs negative      Skin: There are no rashes, ulcerations or lesions. Gait: Nonantalgic      Reflex intact upper     Additional Comments:        Additional Examinations:           Neck: Examination of the neck does not show any tenderness, deformity or injury. Range of motion is unremarkable. There is no gross instability. There are no rashes, ulcerations or lesions. Strength and tone are normal.     Right upper extremity-elbow  Full range of motion low-grade discomfort end ranges  Tenderness mild over the medial and lateral epicondyle regions, positive Tinel's at the elbow  Strength-purposely not assessed with any resistance    Office Imaging Results/Procedures PerformedToday:      Radiology:      X-rays obtained and reviewed in office:   Views 3 views left shoulder   Location left shoulder   Impression subtle degenerative change affecting the glenohumeral joint with joint space preservation.   Type I/II acromion morphology low-grade AC joint arthropathy no other soft tissue or osseous abnormalities       Office Procedures:     Orders Placed This Encounter   Procedures    XR SHOULDER LEFT (MIN 2 VIEWS)     Standing Status:   Future     Number of Occurrences:   1     Standing Expiration Date:   5/10/2022     Order Specific Question:   Reason for exam:     Answer:   pain    MRI SHOULDER LEFT WO CONTRAST     Proscan Rosa, please call pt to schedule, [address] will obtain auth and fwd to your facility  Pt advised to f/u in clinic 2-3 days after MRI for results     Standing Status:   Future     Standing Expiration Date:   5/10/2022     Order Specific Question:   Reason for exam:     Answer:   r/o labral/SLAP lesion, tear    Ambulatory referral to Occupational Therapy     Referral Priority:   Routine     Referral Type:   Eval and Treat     Referral Reason:   Specialty Services Required     Requested Specialty:   Occupational Therapy     Number of Visits Requested:   1           Other Outside Imaging and Testing Personally Reviewed:    Xr Shoulder Left (min 2 Views)    Result Date: 5/10/2021  Radiology exam is complete. No Radiologist dictation. Please follow up with ordering provider. Assessment   Impression: . Encounter Diagnoses   Name Primary?  Glenoid labral tear, left, initial encounter Yes    Left shoulder pain, unspecified chronicity     Spontaneous rupture of extensor tendon of right upper arm     Lateral epicondylitis of right elbow     Spontaneous rupture of flexor tendon of right forearm     Medial epicondylitis of elbow, right               Plan:       MRI evaluation left shoulder evaluate integrity of superior labrum/biceps anchor rule out tear  Active modification labral protocol left  Continue elbow range of motion and static stretching progressed to eccentric strengthening common flexor common extensor tendons at the elbow epicondyle-OT supervision 1-2 sessions over the next 1 month  D ARTEM on follow-up and limited ultrasound evaluation of the right elbow in 1 month  Telephone encounter/virtual visit regarding MRI results left shoulder  Strict avoidance of NSAIDs for the next 2 weeks relative to PRP right elbow    The nature of the finding, probable diagnosis and likely treatment was thoroughly discussed with the patient. The options, risks, complications, alternative treatment as well as some of the differential diagnosis was discussed. The patient was thoroughly informed and all questions were answered. the patient indicated understanding and satisfaction with the discussion.       Orders:        Orders Placed This Encounter   Procedures    XR SHOULDER LEFT (MIN 2 VIEWS)     Standing Status:   Future     Number of Occurrences:   1     Standing Expiration Date:   5/10/2022     Order Specific Question:   Reason for exam:     Answer:   pain    MRI SHOULDER LEFT WO CONTRAST     Proscan Rosa, please call pt to schedule, [address] will obtain auth and fwd to your facility  Pt advised to f/u in clinic 2-3 days after MRI for results     Standing Status:   Future     Standing Expiration Date:   5/10/2022     Order Specific Question:   Reason for exam:     Answer:   r/o labral/SLAP lesion, tear    Ambulatory referral to Occupational Therapy     Referral Priority:   Routine     Referral Type:   Eval and Treat     Referral Reason:   Specialty Services Required     Requested Specialty:   Occupational Therapy     Number of Visits Requested:   1         Disclaimer: \"This note was dictated with voice recognition software. Though review and correction are routine, we apologize for any errors. \"

## 2021-06-08 ENCOUNTER — OFFICE VISIT (OUTPATIENT)
Dept: ORTHOPEDIC SURGERY | Age: 46
End: 2021-06-08
Payer: COMMERCIAL

## 2021-06-08 VITALS — HEIGHT: 65 IN | BODY MASS INDEX: 21.67 KG/M2 | WEIGHT: 130.07 LBS

## 2021-06-08 DIAGNOSIS — M77.11 LATERAL EPICONDYLITIS OF RIGHT ELBOW: ICD-10-CM

## 2021-06-08 DIAGNOSIS — M66.331 SPONTANEOUS RUPTURE OF FLEXOR TENDON OF RIGHT FOREARM: ICD-10-CM

## 2021-06-08 DIAGNOSIS — M66.221 SPONTANEOUS RUPTURE OF EXTENSOR TENDON OF RIGHT UPPER ARM: ICD-10-CM

## 2021-06-08 DIAGNOSIS — M77.01 MEDIAL EPICONDYLITIS OF ELBOW, RIGHT: ICD-10-CM

## 2021-06-08 DIAGNOSIS — M75.42 ROTATOR CUFF IMPINGEMENT SYNDROME, LEFT: ICD-10-CM

## 2021-06-08 DIAGNOSIS — M67.912 TENDINOPATHY OF LEFT ROTATOR CUFF: Primary | ICD-10-CM

## 2021-06-08 PROCEDURE — 99214 OFFICE O/P EST MOD 30 MIN: CPT | Performed by: INTERNAL MEDICINE

## 2021-06-08 PROCEDURE — 20611 DRAIN/INJ JOINT/BURSA W/US: CPT | Performed by: INTERNAL MEDICINE

## 2021-06-08 RX ORDER — BUPIVACAINE HYDROCHLORIDE 2.5 MG/ML
4 INJECTION, SOLUTION INFILTRATION; PERINEURAL ONCE
Status: COMPLETED | OUTPATIENT
Start: 2021-06-08 | End: 2021-06-08

## 2021-06-08 RX ORDER — BETAMETHASONE SODIUM PHOSPHATE AND BETAMETHASONE ACETATE 3; 3 MG/ML; MG/ML
6 INJECTION, SUSPENSION INTRA-ARTICULAR; INTRALESIONAL; INTRAMUSCULAR; SOFT TISSUE ONCE
Status: COMPLETED | OUTPATIENT
Start: 2021-06-08 | End: 2021-06-08

## 2021-06-08 RX ADMIN — BUPIVACAINE HYDROCHLORIDE 10 MG: 2.5 INJECTION, SOLUTION INFILTRATION; PERINEURAL at 17:15

## 2021-06-08 RX ADMIN — BETAMETHASONE SODIUM PHOSPHATE AND BETAMETHASONE ACETATE 6 MG: 3; 3 INJECTION, SUSPENSION INTRA-ARTICULAR; INTRALESIONAL; INTRAMUSCULAR; SOFT TISSUE at 17:15

## 2021-06-08 NOTE — PROGRESS NOTES
Chief Complaint:   Chief Complaint   Patient presents with    Elbow Pain     right, f/u PRP 4/29/21, overall improved, being cautious, \"underneath\" is more tender still    Shoulder Pain     left, feeling worse, pain is worse and more often, TR MRI          History of Present Illness:       Patient is a 50 y.o. female returns follow up for the above complaint. The patient was last seen approximately 1 monthsago. The symptoms are improving since the last visit with respect to her right elbow and she is very pleased with this. The patient has had no further testing for the problem. Status post ultrasound-guided type I PRP injection-right common extensor tendon right elbow and right common flexor tendon left elbow-lateral epicondyle and medial epicondyle respectively-right 4/29/2021-repeat biologic intervention-deep fiber thin interstitial tear/tendinopathy common extensor tendon mid segment region extending proximal to distal from the epicondylar ridge, common flexor tendon interstitial/deep fiber just distal to the epicondylar ridge extending to the myotendinous junction    D ARTEM 25, prior 61    No reliance on NSAIDs or other analgesics she continues with maintenance HEP self-directed    With respect to her left shoulder this remains problematic she like to consider other treatment options    MRI completed in the interim    The majority of her pain is lateral and posterior on symptomatic and seems to follow a rotator cuff provocative pattern.   No subjective instability or mechanical symptoms at this time             Past Medical History:        Past Medical History:   Diagnosis Date    ANTONIA positive     Endometriosis     Lupus (HCC)     minor,    Meniscus tear     left    Migraines     [place] - neurologist     PONV (postoperative nausea and vomiting)     very severe nausea & vomiting        Present Medications:         Current Outpatient Medications   Medication Sig Dispense Refill    ketorolac (TORADOL) 10 MG tablet Take 1 tablet by mouth three times daily Discontinue all other NSAIDs during the course of treatment and resumed thereafter 15 tablet 0    predniSONE (DELTASONE) 10 MG tablet Days1-2:50mg PO QD,Days3-4:40mg PO QD,Days5-6:30mg PO QD,Days7-8:20mg PO QD,Days 9-10:10mg PO QD 30 tablet 0    azithromycin (ZITHROMAX) 250 MG tablet       topiramate (TOPAMAX) 200 MG tablet Take 100 mg by mouth 2 times daily      Prenatal Multivit-Min-Fe-FA (PRE-VILLA PO) Take by mouth      meloxicam (MOBIC) 15 MG tablet Take 1 tablet by mouth daily 30 tablet 3    rizatriptan (MAXALT) 10 MG tablet prn      OnabotulinumtoxinA (BOTOX) 200 UNITS SOLR Every 3 months       No current facility-administered medications for this visit. Allergies: Allergies   Allergen Reactions    Ultram [Tramadol Hcl] Itching           Review of Systems:    Pertinent items are noted in HPI       Vital Signs: There were no vitals filed for this visit. General Exam:     Constitutional: Patient is adequately groomed with no evidence of malnutrition    Physical Exam: left elbow      Primary Exam:    Inspection: No deformity atrophy appreciable effusion      Palpation: Mild tenderness of the medial epicondyle region      Range of Motion: Full range and symmetric at the elbow wrist      Strength: Submaximal resisted with only low-grade discomfort flexor and extensor supinator mass      Special Tests: Negative      Skin: There are no rashes, ulcerations or lesions.       Gait: non-Antalgic      Neurovascular - non focal and intact       Additional Comments:        Additional Examinations:            Shoulder-left    Range of motion full range and symmetric pain in extremes overhead  Strength-near normal with mild pain forward elevation  Special tests-impingement sign positive    Office Imaging Results/Procedures PerformedToday:            Office Procedures:     Orders Placed This Encounter   Procedures    US GUIDED NEEDLE PLACEMENT     Standing Status:   Future     Number of Occurrences:   1     Standing Expiration Date:   6/8/2022     Order Specific Question:   Reason for exam:     Answer:   SAS CSI    US EXTREMITY RIGHT NON VASC LIMITED     Standing Status:   Future     Number of Occurrences:   1     Standing Expiration Date:   6/8/2022     Order Specific Question:   Reason for exam:     Answer:   f/u PRP    Ambulatory referral to Physical Therapy     Referral Priority:   Routine     Referral Type:   Eval and Treat     Referral Reason:   Specialty Services Required     Number of Visits Requested:   1    CT ARTHROCENTESIS ASPIR&/INJ MAJOR JT/BURSA W/O US    CT APPLY LOWER LEG SPLINT     Limited ultrasound evaluation right elbow  Logiq E ultrasound  12 MHz      Patient position supine on examination table right upper extremity was supported the common extensor tendon was evaluated extensively in long axis. Overall caliber of the tendon near normal.  Area of pathology localizing to the interstitial/deep fiber region of the tendon mid segment level where there is moderate tendinopathy change. With dynamic stressing no discrete gapping of fibers to suggest tear. Radiocapitellar joint was noted to have a normal sonographic appearance    Power Doppler imaging negative    The arm was then positioned overhead the medial aspect of the elbow was accessible for evaluation. The common flexor tendon was evaluated extensively in long axis. The area of pathology localizing to the interstitial fiber region mid segment region with decreased echogenicity consistent with moderate tendinopathy change.   With dynamic stressing no discrete gapping of fibers to suggest tear    Power Doppler imaging negative    No other soft tissue abnormalities appreciated      --------------------------------------------------------------------------      Logic E ultrasound/ 10 HZ      Ultrasound-guided injection of the shoulder    The patient was placed supine shoulder, initial encounter. Lesta Peeling in left    shoulder.       TECHNICAL FACTORS:  Long- and short-axis fat- and water-weighted images were performed.       COMPARISON:  None.       FINDINGS:  Long head biceps is intact.       No fracture or mass.       Mild cuff tendinopathy and peritendinitis.  No cuff tear.       No labral tear.  No biceps tear.       AC joint hypertrophy present.       CONCLUSION:   1. Mild cuff tendinopathy and peritendinitis.  No cuff tear. 2. Outlet-related cuff impingement secondary to [facility] joint hypertrophy and a lateral downsloping    hypertrophied type 2 acromion. AC joint is degenerated. 3. Frayed worn posterosuperior labrum.  Chronic wear present.  No acute or displaced labral    tear demonstrated.       Thank you for the opportunity to provide your interpretation.               Layne Horton MD       A: RAIZA/ 05/20/2021 11:02 PM   T:  05/20/2021 10:35 PM              Assessment   Impression: . Encounter Diagnoses   Name Primary?  Tendinopathy of left rotator cuff Yes    Rotator cuff impingement syndrome, left     Spontaneous rupture of extensor tendon of right upper arm     Spontaneous rupture of flexor tendon of right forearm     Lateral epicondylitis of right elbow     Medial epicondylitis of elbow, right         Status post ultrasound-guided type I PRP injection-right common extensor tendon right elbow and right common flexor tendon left elbow-lateral epicondyle and medial epicondyle respectively-right 4/29/2021-repeat biologic intervention-deep fiber thin interstitial tear/tendinopathy common extensor tendon mid segment region extending proximal to distal from the epicondylar ridge, common flexor tendon interstitial/deep fiber just distal to the epicondylar ridge extending to the myotendinous junction      Plan:        Activity modification-lateral epicondylitis protocol continue maintenance I HEP eccentric strengthening emphasis common extensor and common flexor tendons  Postinjection protocol left shoulder and formal course of PT rotator cuff conditioning  D ARTEM and limited ultrasound evaluation of the right elbow on follow-up           Orders:        Orders Placed This Encounter   Procedures    US GUIDED NEEDLE PLACEMENT     Standing Status:   Future     Number of Occurrences:   1     Standing Expiration Date:   6/8/2022     Order Specific Question:   Reason for exam:     Answer:   SAS CSI    US EXTREMITY RIGHT NON VASC LIMITED     Standing Status:   Future     Number of Occurrences:   1     Standing Expiration Date:   6/8/2022     Order Specific Question:   Reason for exam:     Answer:   f/u PRP    Ambulatory referral to Physical Therapy     Referral Priority:   Routine     Referral Type:   Eval and Treat     Referral Reason:   Specialty Services Required     Number of Visits Requested:   1    [facility] ARTHROCENTESIS ASPIR&/INJ MAJOR JT/BURSA W/O US    Thanh Garzon MD.      Aditi Munoz: \"This note was dictated with voice recognition software. Though review and correction are routine, we apologize for any errors. \"

## 2021-06-17 ENCOUNTER — HOSPITAL ENCOUNTER (OUTPATIENT)
Dept: PHYSICAL THERAPY | Age: 46
Setting detail: THERAPIES SERIES
Discharge: HOME | End: 2021-06-17
Payer: COMMERCIAL

## 2021-06-17 PROCEDURE — 97110 THERAPEUTIC EXERCISES: CPT

## 2021-06-17 PROCEDURE — 97161 PT EVAL LOW COMPLEX 20 MIN: CPT

## 2021-06-17 NOTE — PLAN OF CARE
over-using her (L) UE due to chronic (R) forearm/elbow pain including recent PRP treatment. Pt has seen Dr. Nadir Rich and did have a recent MRI for any rotator cuff or labral tearing. Per the report, mild rotator cuff tendonopathy, outlet type impingement, and some labral fraying. Pt has had a cortisone injection with limited relief. Pt notes the pain is mainly anterolateral and very localized around the shoulder. Pt notes the pain is an electric shock that can last for a few seconds and then is gone. Pt states that she has pain with OH, BB reaching and work activities. 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      Relevant Medical History:Lupus, hx of chronic (R) elbow/forearm dysfunction   Functional Disability Index: SPADI 26% LOF    Pain Scale: 0/10 currently  8/10 at worst  Easing factors: avoiding aggravating movements  Provocative factors:  Reaching for a seatbelt, reaching OH, extension/behind the back    Type: []Constant   []Intermittent  []Radiating [x]Localized []other:     Numbness/Tingling: None down (L) arm    Occupation/School: [employer]    Living Status/Prior Level of Function: Independent with ADLs and IADLs,  Works out regularly, normal is to utilize dumbbells for LE and UE resistance training    OBJECTIVE:     CERV ROM     Cervical Flexion WNL    Cervical Extension WNL    Cervical SB WNL    Cervical rotation WNL WNL        ROM Left Right   Shoulder Flex 145 deg pain 180 deg   Shoulder Abd 160 deg pain 180 deg   Shoulder ER 70 deg pain @ 90/90 90 deg   Shoulder IR To t8 To T3                  Strength  Left Right   Shoulder Flex 4 4-   Shoulder Scap 4- pain 4   Shoulder ER 3+ 4   Shoulder IR 5 5   Low Trap 4- 4-   Mid Trap 4 4+   Lats 4- 4+               Reflexes/Sensation:    [x]Dermatomes/Myotomes intact    [x]Reflexes equal and normal bilaterally   []Other:    Joint mobility:Mild hypomobility posterior outcome. [x] EVAL (LOW) 49602 (typically 30 minutes face-to-face)  [] EVAL (MOD) 13897 (typically 30 minutes face-to-face)  [] EVAL (HIGH) 47194 (typically 45 minutes face-to-face)  [] RE-EVAL     PLAN:  Frequency/Duration:  1-2 days per week for 8 Weeks: (Pt will be gone on vacation for 10 days starting next week)  INTERVENTIONS:  [x] Therapeutic exercise including: strength training, ROM, for Upper extremity and core   [x]  NMR activation and proprioception for UE, scap and Core   [x] Manual therapy as indicated for shoulder, scapula and spine to include: Dry Needling/IASTM, STM, PROM, Gr I-IV mobilizations, manipulation. [x] Modalities as needed that may include: thermal agents, E-stim, Biofeedback, US, iontophoresis as indicated  [x] Patient education on joint protection, postural re-education, activity modification, progression of HEP. HEP instruction: see media    GOALS:  Patient stated goal: Get rid of pain   Normal participation in exercise  [] Progressing: [] Met: [] Not Met: [] Adjusted    Therapist goals for Patient:   Short Term Goals: To be achieved in: 2 weeks  1. Independent in HEP and progression per patient tolerance, in order to prevent re-injury. [] Progressing: [] Met: [] Not Met: [] Adjusted  2. Patient will have a decrease in pain to facilitate improvement in movement, function, and ADLs as indicated by Functional Deficits. [] Progressing: [] Met: [] Not Met: [] Adjusted    Long Term Goals: To be achieved in: 8 weeks  1. Disability index score of 10% or less for the SPADI to assist with reaching prior level of function. [] Progressing: [] Met: [] Not Met: [] Adjusted  2. Patient will demonstrate increased AROM to 160 deg or > (L) shoulder flex/abduction to allow for proper joint functioning as indicated by patients Functional Deficits. [] Progressing: [] Met: [] Not Met: [] Adjusted  3.  Patient will demonstrate an increase in Strength to 4+/5 or > (L) ER for improved shoulder stability and control with OH movements. [] Progressing: [] Met: [] Not Met: [] Adjusted  4. Patient's pain will reduce by 50% or > at worst in order to help pt perform occupational activities. [] Progressing: [] Met: [] Not Met: [] Adjusted  5. Patient will be able to participate fully in normal exercise activities without limitation due to the (L) shoulder for PLOF.   [] Progressing: [] Met: [] Not Met: [] Adjusted     Electronically signed by:  Chace Roldan, PT

## 2021-06-17 NOTE — FLOWSHEET NOTE
[facility]    Physical Therapy Treatment Note/ Progress Report:     Date:  2021    Patient Name:  Rossy Miller    :  1975  MRN: [id number]  Medical/Treatment Diagnosis Information:  Diagnosis: M67.912 Tendinopathy of (L) RTC  M75,42 (L) Rotator cuff Impingement  Treatment Diagnosis: (L) Shoulder Pain  Decreased (L) Shoulder Girdle Strength  Insurance/Certification information:  PT Insurance Information: [employer] (no copay no auth)  Physician Information:  Referring Practitioner: Dr. Harmony Beaulieu of care signed (Y/N): Pending     Date of Patient follow up with Physician:      Progress Report: [x]  Yes  []  No     Functional Scale: SPADI 26% LOF   Date: 2021    Date Range for reporting period:  Beginnin2021  Endin2021    Progress report due (10 Rx/or 30 days whichever is less): 7075     Recertification due (POC duration/ or 90 days whichever is less): 2021    Visit # Insurance Allowable Auth Needed   1 40 PT/OT []Yes    [x]No     Pain level:  0/10 currently 8/10 at worst     SUBJECTIVE:  See eval    OBJECTIVE: See eval   Observation:    Test measurements:      RESTRICTIONS/PRECAUTIONS: Bands only for (R) UE    Exercises/Interventions:   Therapeutic Ex 20' Wt / Resistance Sets/sec Reps Notes   Supine AAROM dowel 3\" 10 Painfree Range HEP   Prone Row 5lb 2 12 HEP   Prone Ext 3lb 2 12 HEP   Sidelying ER 2-->3lb 2 12 HEP                                                                            Therapeutic Activities                                                                      Manual Intervention       Shld /GH Mobs       Post Cap mobs       Thoracic/Rib manipulation       CT MT/Mobs       PROM MT                     NMR re-education                                                                   Therapeutic Exercise and NMR EXR  [x] (47837) Provided verbal/tactile cueing for activities related 20'   Total Treatment Minutes: 48'       [x] EVAL (LOW) 22021 (typically 20 minutes face-to-face)  [] EVAL (MOD) 55668 (typically 30 minutes face-to-face)  [] EVAL (HIGH) 82040 (typically 45 minutes face-to-face)  [] RE-EVAL     [x] AI(03473) x  1   [] IONTO (48786)  [] NMR (40641) x     [] VASO (93962)  [] Manual (28994) x     [] Other:  [] TA (20215)x     [] Mech Traction (47861)  [] ES(attended) (87895)     [] ES (un) (54071): If BWC Please Indicate Time In/Out and Total Minutes  CPT Code Time in Time out Total Min                                              GOALS:  Patient stated goal: Get rid of pain   Normal participation in exercise  [] Progressing: [] Met: [] Not Met: [] Adjusted    Therapist goals for Patient:   Short Term Goals: To be achieved in: 2 weeks  1. Independent in HEP and progression per patient tolerance, in order to prevent re-injury. [] Progressing: [] Met: [] Not Met: [] Adjusted  2. Patient will have a decrease in pain to facilitate improvement in movement, function, and ADLs as indicated by Functional Deficits. [] Progressing: [] Met: [] Not Met: [] Adjusted    Long Term Goals: To be achieved in: 8 weeks  1. Disability index score of 10% or less for the SPADI to assist with reaching prior level of function. [] Progressing: [] Met: [] Not Met: [] Adjusted  2. Patient will demonstrate increased AROM to 160 deg or > (L) shoulder flex/abduction to allow for proper joint functioning as indicated by patients Functional Deficits. [] Progressing: [] Met: [] Not Met: [] Adjusted  3. Patient will demonstrate an increase in Strength to 4+/5 or > (L) ER for improved shoulder stability and control with OH movements. [] Progressing: [] Met: [] Not Met: [] Adjusted  4. Patient's pain will reduce by 50% or > at worst in order to help pt perform occupational activities. [] Progressing: [] Met: [] Not Met: [] Adjusted  5. Patient will be able to participate fully in normal exercise activities without limitation due to the (L) shoulder for PLOF. [] Progressing: [] Met: [] Not Met: [] Adjusted    Progression Towards Functional goals:  [] Patient is progressing as expected towards functional goals listed. [] Progression is slowed due to complexities listed. [] Progression has been slowed due to co-morbidities. [x] Plan just implemented, too soon to assess goals progression  [] Other:     ASSESSMENT:  See eval    Return to Play: (if applicable)   []  Stage 1: Intro to Strength   []  Stage 2: Dynamic Strength and Intro to Plyometrics   []  Stage 3: Advanced Plyometrics and Intro to Throwing   []  Stage 4: Sport specific Training/Return to Sport     []  Ready to Return to Play, [employer]   []  Not Ready for Return to Sports   Comments:      Treatment/Activity Tolerance:  [x] Patient tolerated treatment well [] Patient limited by fatique  [] Patient limited by pain  [] Patient limited by other medical complications  [] Other:     Overall Progression Towards Functional goals/ Treatment Progress Update:  [] Patient is progressing as expected towards functional goals listed. [] Progression is slowed due to complexities/Impairments listed. [] Progression has been slowed due to co-morbidities.   [x] Plan just implemented, too soon to assess goals progression <30days   [] Goals require adjustment due to lack of progress  [] Patient is not progressing as expected and requires additional follow up with physician  [] Other    Prognosis for POC: [x] Good [] Fair  [] Poor    Patient requires continued skilled intervention: [x] Yes  [] No      PLAN: See eval  [] Continue per plan of care [] Alter current plan (see comments)  [x] Plan of care initiated [] Hold pending MD visit [] Discharge    Electronically signed by: Shreya Rebolledo PT     Note: If patient does not return for scheduled/recommended follow up visits, this note will serve as a discharge from care along with the most recent update on progress.

## 2021-07-08 ENCOUNTER — HOSPITAL ENCOUNTER (OUTPATIENT)
Dept: PHYSICAL THERAPY | Age: 46
Setting detail: THERAPIES SERIES
Discharge: HOME | End: 2021-07-08
Payer: COMMERCIAL

## 2021-07-08 NOTE — FLOWSHEET NOTE
[facility]    Physical Therapy  Cancellation/No-show Note  Patient Name:  Donal Good  :  1975   Date:  2021  Cancelled visits to date: 1  No-shows to date: 0    For today's appointment patient:  [x]  Cancelled  []  Rescheduled appointment  []  No-show     Reason given by patient:  []  Patient ill  []  Conflicting appointment  []  No transportation    []  Conflict with work  [x]  No reason given  []  Other:     Comments:      Phone call information:   []  Phone call made today to patient at _ time at number provided:      []  Patient answered, conversation as follows:    []  Patient did not answer, message left as follows:  []  Phone call not made today  [x]  Phone call not needed - pt contacted us to cancel and provided reason for cancellation.      Electronically signed by:  Celine Love, PT, PT

## 2021-09-08 ENCOUNTER — OFFICE VISIT (OUTPATIENT)
Dept: ORTHOPEDIC SURGERY | Age: 46
End: 2021-09-08
Payer: COMMERCIAL

## 2021-09-08 VITALS — BODY MASS INDEX: 21.67 KG/M2 | WEIGHT: 130.07 LBS | HEIGHT: 65 IN

## 2021-09-08 DIAGNOSIS — M77.11 LATERAL EPICONDYLITIS OF RIGHT ELBOW: ICD-10-CM

## 2021-09-08 DIAGNOSIS — M66.331 SPONTANEOUS RUPTURE OF FLEXOR TENDON OF RIGHT FOREARM: Primary | ICD-10-CM

## 2021-09-08 DIAGNOSIS — M75.42 ROTATOR CUFF IMPINGEMENT SYNDROME, LEFT: ICD-10-CM

## 2021-09-08 DIAGNOSIS — M77.01 MEDIAL EPICONDYLITIS OF ELBOW, RIGHT: ICD-10-CM

## 2021-09-08 DIAGNOSIS — M66.221 SPONTANEOUS RUPTURE OF EXTENSOR TENDON OF RIGHT UPPER ARM: ICD-10-CM

## 2021-09-08 DIAGNOSIS — M67.912 TENDINOPATHY OF LEFT ROTATOR CUFF: ICD-10-CM

## 2021-09-08 PROCEDURE — 99214 OFFICE O/P EST MOD 30 MIN: CPT | Performed by: INTERNAL MEDICINE

## 2021-09-08 RX ORDER — KETOROLAC TROMETHAMINE 10 MG/1
10 TABLET, FILM COATED ORAL 3 TIMES DAILY
Qty: 15 TABLET | Refills: 0 | Status: SHIPPED | OUTPATIENT
Start: 2021-09-08

## 2021-09-08 NOTE — PROGRESS NOTES
Chief Complaint:   Chief Complaint   Patient presents with    Elbow Pain     right, f/u PRP 4/29/21, doing well, no issues, except some soreness with having lots of dental procedures in one day    Shoulder Pain     left, was doing well, then returned to boxing exercises, felt sharp pain with uppercut, has been worsening since, stopped boxing, returned to PT HEP          History of Present Illness:       Patient is a 50 y.o. female returns follow up for the above complaint. The patient was last seen approximately 3 monthsago. The symptoms are improving since the last visit with respect to the right elbow. The patient has had no further testing for the problem. Status post ultrasound-guided type I PRP injection-right common extensor tendon right elbow and right common flexor tendon left elbow-lateral epicondyle and medial epicondyle respectively-right 4/29/2021-repeat biologic intervention-deep fiber thin interstitial tear/tendinopathy common extensor tendon mid segment region extending proximal to distal from the epicondylar ridge, common flexor tendon interstitial/deep fiber just distal to the epicondylar ridge extending to the myotendinous junction    D ARTEM: 0-2, prior 25 prior 61    She is very pleased with the right elbow and has no limitations with ADLs or work activities    No mechanical symptoms no subjective instability no reliance on NSAIDs or other analgesics    With respect to left shoulder she noted worsening pattern of pain after recent increase in her boxing workout specifically aggravated by an upper cut maneuver of the shoulder using a punching bag.     She describes \"electrical shock\" type pain localizing to the lateral shoulder and symptoms that suggest a rotator cuff provocative pattern    Previous treatment has included low-dose subacromial injection under ultrasound guidance on 6/8/2021    She did complete sessions of PT in the interim thereafter             Past Medical History:        Past Medical History:   Diagnosis Date    ANTONIA positive     Endometriosis     Lupus (HCC)     minor,    Meniscus tear     left    Migraines     [place] - neurologist     PONV (postoperative nausea and vomiting)     very severe nausea & vomiting        Present Medications:         Current Outpatient Medications   Medication Sig Dispense Refill    ketorolac (TORADOL) 10 MG tablet Take 1 tablet by mouth three times daily Discontinue all other NSAIDs during the course of treatment and resumed thereafter 15 tablet 0    predniSONE (DELTASONE) 10 MG tablet Days1-2:50mg PO QD,Days3-4:40mg PO QD,Days5-6:30mg PO QD,Days7-8:20mg PO QD,Days 9-10:10mg PO QD 30 tablet 0    azithromycin (ZITHROMAX) 250 MG tablet       topiramate (TOPAMAX) 200 MG tablet Take 100 mg by mouth 2 times daily      Prenatal Multivit-Min-Fe-FA (PRE-VILLA PO) Take by mouth      meloxicam (MOBIC) 15 MG tablet Take 1 tablet by mouth daily 30 tablet 3    rizatriptan (MAXALT) 10 MG tablet prn      OnabotulinumtoxinA (BOTOX) 200 UNITS SOLR Every 3 months       No current facility-administered medications for this visit. Allergies: Allergies   Allergen Reactions    Ultram [Tramadol Hcl] Itching           Review of Systems:    Pertinent items are noted in HPI         Vital Signs: There were no vitals filed for this visit. General Exam:     Constitutional: Patient is adequately groomed with no evidence of malnutrition    Physical Exam: right elbow      Primary Exam:    Inspection: No deformity atrophy appreciable effusion      Palpation: There is minimal tenderness over the lateral condyle greater than medial epicondyle with moderate pressure      Range of Motion: Full range and symmetric at the elbow wrist without pain      Strength: Normal extensor and flexor muscle mass without pain      Special Tests: Negative      Skin: There are no rashes, ulcerations or lesions.       Gait: Nonantalgic      Neurovascular - non focal and intact       Additional Comments:        Additional Examinations:           Shoulder examination-left  Range of motion full range symmetric in forward elevation negligible discomfort endrange forward elevation  Strength-minimal asymmetric decrease abduction with low-grade pain  Special tests: Neer's impingement equivocal, Leija impingement negative      Office Imaging Results/Procedures PerformedToday:            Office Procedures:     Orders Placed This Encounter   Procedures    US EXTREMITY RIGHT NON VASC LIMITED     Standing Status:   Future     Number of Occurrences:   1     Standing Expiration Date:   9/8/2022     Order Specific Question:   Reason for exam:     Answer:   f/u PRP    Ambulatory referral to Physical Therapy     Referral Priority:   Routine     Referral Type:   Eval and Treat     Referral Reason:   Specialty Services Required     Number of Visits Requested:   1     Ultrasound evaluation-right elbow  Logiq ultrasound 12 MHz    Patient position supine examination table right upper extremity was supported. Common extensor tendon was evaluated extensively in long axis. Overall caliber of the tendon mildly increased and only mild tendinopathy change in the deep fiber region of the tendon distal to the epicondylar ridge extending to the humeral side of the radiocapitellar joint. Mild decrease in echogenicity and increase in caliber of the proximal radiocapitellar complex at this anatomic level system with degenerative change/brain. With dynamic imaging the radiocapitellar complex is noted to be intact. With dynamic stressing of the common extensor tendon there is no evidence of gapping of fibers. The medial elbow was then evaluated extensively and the common flexor tendon was noted to have a relatively normal sonographic appearance with only low-grade tendinopathy change in the interstitial/deep fiber region of the tendon juxtaposed to the epicondylar ridge.     With dynamic stressing there was no evidence of gapping of fibers. No other soft tissue or osseous abnormalities          Other Outside Imaging and Testing Personally Reviewed:               Assessment   Impression: . Encounter Diagnoses   Name Primary?  Spontaneous rupture of flexor tendon of right forearm Yes    Spontaneous rupture of extensor tendon of right upper arm     Lateral epicondylitis of right elbow     Medial epicondylitis of elbow, right     Tendinopathy of left rotator cuff     Rotator cuff impingement syndrome, left         Status post ultrasound-guided type I PRP injection-right common extensor tendon right elbow and right common flexor tendon left elbow-lateral epicondyle and medial epicondyle respectively-right 4/29/2021-repeat biologic intervention-deep fiber thin interstitial tear/tendinopathy common extensor tendon mid segment region extending proximal to distal from the epicondylar ridge, common flexor tendon interstitial/deep fiber just distal to the epicondylar ridge extending to the myotendinous junction      Plan:       Liberalize activities with respect to the right elbow active modification letter epicondylitis protocol medial epicondylitis protocol  Maintenance conditioning exercises medial lateral elbow common flexor/extensor tendons  Trial of high-dose NSAIDs-Toradol as needed for persistent left shoulder pain  Continue rotator cuff conditioning program and retrial of PT/progression of PT as needed  Consider her a candidate for subacromial decompression as a treatment option as needed  Ultrasound-guided subacromial steroid injection only for escalating pain levels  No clear indication for biologic orthopedic injection about the left shoulder at this time    She has demonstrated statistically significant improvement in the functional questionnaire as relates to her elbow and there is sonographic stability and improvement evident objectively.        Orders:        Orders Placed This Encounter   Procedures    US EXTREMITY RIGHT NON VASC LIMITED     Standing Status:   Future     Number of Occurrences:   1     Standing Expiration Date:   9/8/2022     Order Specific Question:   Reason for exam:     Answer:   f/u PRP    Ambulatory referral to Physical Therapy     Referral Priority:   Routine     Referral Type:   Eval and Treat     Referral Reason:   Specialty Services Required     Number of Visits Requested:   1         Haylee Glynn MD.      Roxana Murray: \"This note was dictated with voice recognition software. Though review and correction are routine, we apologize for any errors. \"

## 2021-11-30 ENCOUNTER — HOSPITAL ENCOUNTER (OUTPATIENT)
Dept: PHYSICAL THERAPY | Age: 46
Setting detail: THERAPIES SERIES
Discharge: HOME | End: 2021-11-30
Payer: COMMERCIAL

## 2021-11-30 PROCEDURE — 97161 PT EVAL LOW COMPLEX 20 MIN: CPT

## 2021-11-30 PROCEDURE — 97110 THERAPEUTIC EXERCISES: CPT
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
## 2021-11-30 NOTE — FLOWSHEET NOTE
[place] [address] [place]  Phone: [phone] Fax: [phone]    Physical Therapy Treatment Note/ Progress Report:     Date:  2021    Patient Name:  Jair Ledesma    :  1975  MRN: [id number]  Restrictions/Precautions:    Medical/Treatment Diagnosis Information:  · Diagnosis: L shoulder arthroscopy, SAD, DCE, shoulder pain M25. 512  · Treatment Diagnosis: L shoulder arthroscopy, SAD, DCE, shoulder pain M25. 983  Insurance/Certification information:     Physician Information:  Referring Practitioner: Dr Gigi Bolivar of care signed (Y/N):     Date of Patient follow up with Physician:      Progress Report: []  Yes  []  No     Date Range for reporting period:  Beginnin21  Ending:       Progress report due (10 Rx/or 30 days whichever is less):       Recertification due (POC duration/ or 90 days whichever is less):       Visit # Insurance Allowable Auth Needed   2  40 []Yes    []No     Pain level:  0-8/10     SUBJECTIVE:  See eval    OBJECTIVE: See eval   Observation:    Test measurements:      RESTRICTIONS/PRECAUTIONS: L shoulder arthroscopy 21.  L lower cervical radic    Exercises/Interventions:   Therapeutic Ex (32541)  Min: Sets/sec Reps CUES/Notes   UBE      Pendulum/Ball rolls      Cane AAROM flex/press 1 12 Flex/  ER   3 way Isomet 5 sec 12 ER/IR   T- band Row/pinch      T- band lower pinch      T- band ER activation      Supine SA punch      SL ER/SL punch      Prone Rows/ext      Prone HAB/Prone Flex      Seat Table slides/Wall Slides      Seated HH Depression      No Money      Scap Wall Lat touches/wall walks            Standing flex/scap      Standing Punch      Lawnmower                              Manual Intervention  (82313)  Min:      Shld /GH Mobs      Post Cap mobs      Thoracic/Rib manipualtion      CT MT/Mobs 3 min  sidely PA C6   PROM MT 5 min  L shoulder   Elbow mobs            NMR re-education (78612)  Min:      T-spine Ext      GH depress/compress      Scap/GH NMR      Body blade      Wall ball roll      Wall Ball bounce      Ball drops      Dionne Scap Bio      Floor Snow angels-sliders            Therapeutic Activity (08905)  Min:      UE throwing porgression      Dynamic UE stability      Earthquake Bar      Bodyblade                Therapeutic Exercise and NMR EXR  [x] (86944) Provided verbal/tactile cueing for activities related to strengthening, flexibility, endurance, ROM  for improvements in scapular, scapulothoracic and UE control with self care, reaching, carrying, lifting, house/yardwork, driving/computer work. [x] (27439) Provided verbal/tactile cueing for activities related to improving balance, coordination, kinesthetic sense, posture, motor skill, proprioception  to assist with  scapular, scapulothoracic and UE control with self care, reaching, carrying, lifting, house/yardwork, driving/computer work. Therapeutic Activities:    [] (86094 or 37737) Provided verbal/tactile cueing for activities related to improving balance, coordination, kinesthetic sense, posture, motor skill, proprioception and motor activation to allow for proper function of scapular, scapulothoracic and UE control with self care, carrying, lifting, driving/computer work.      Home Exercise Program:    [x] (75041) Reviewed/Progressed HEP activities related to strengthening, flexibility, endurance, ROM of scapular, scapulothoracic and UE control with self care, reaching, carrying, lifting, house/yardwork, driving/computer work  [] (04233) Reviewed/Progressed HEP activities related to improving balance, coordination, kinesthetic sense, posture, motor skill, proprioception of scapular, scapulothoracic and UE control with self care, reaching, carrying, lifting, house/yardwork, driving/computer work      Manual Treatments:  PROM / STM / Oscillations-Mobs:  G-I, II, III, IV (PA's, Inf., Post.)  [] (20197) Provided manual therapy to mobilize soft tissue/joints of cervical/CT, scapular GHJ and UE for the purpose of modulating pain, promoting relaxation,  increasing ROM, reducing/eliminating soft tissue swelling/inflammation/restriction, improving soft tissue extensibility and allowing for proper ROM for normal function with self care, reaching, carrying, lifting, house/yardwork, driving/computer work    Modalities:      Charges:  Timed Code Treatment Minutes: 15   Total Treatment Minutes: 50       [x] EVAL (LOW) 74501 (typically 20 minutes face-to-face)  [] EVAL (MOD) 58107 (typically 30 minutes face-to-face)  [] EVAL (HIGH) 98969 (typically 45 minutes face-to-face)  [] RE-EVAL     [] ZF(36325) x     [] DRY NEEDLE 1 OR 2 MUSCLES  [] NMR (05668) x     [] DRY NEEDLE 3+ MUSCLES  [x] Manual (51708) x       [] TA (00610) x     [] Mech Traction (10099)  [] ES(attended) (13273)     [] ES (un) (90438):   [] VASO (45727)  [] Other:    If [facility] Please Indicate Time In/Out  CPT Code Time in Time out                                   GOALS:    GOALS:  Patient stated goal: work pain free  [] Progressing: [] Met: [] Not Met: [] Adjusted    Therapist goals for Patient:   Short Term Goals: To be achieved in: 2 weeks  1. Independent in HEP and progression per patient tolerance, in order to prevent re-injury. [] Progressing: [] Met: [] Not Met: [] Adjusted  2. Patient will have a decrease in pain to facilitate improvement in movement, function, and ADLs as indicated by Functional Deficits. [] Progressing: [] Met: [] Not Met: [] Adjusted    Long Term Goals: To be achieved in: 10 weeks  1. Disability index score of 10% or less for the Quick DASH to assist with reaching prior level of function. [] Progressing: [] Met: [] Not Met: [] Adjusted  2. Patient will demonstrate increased AROM to [facility] to allow for proper joint functioning as indicated by Functional Deficits. [] Progressing: [] Met: [] Not Met: [] Adjusted  3.  Patient will demonstrate an increase in NM recruitment/activation and overall GH and scapular strength to within n5lbs HHD or WNL for proper functional mobility as indicated by patients Functional Deficits. [] Progressing: [] Met: [] Not Met: [] Adjusted  4. Patient will return to working out without pain  activities without increased symptoms or restriction. [] Progressing: [] Met: [] Not Met: [] Adjusted  5. No pain with sleeping(patient specific functional goal)     [] Progressing: [] Met: [] Not Met: [] Adjusted    ASSESSMENT:  See eval    Return to Play: (if applicable)   []  Stage 1: Intro to Strength   []  Stage 2: Dynamic Strength and Intro to Plyometrics   []  Stage 3: Advanced Plyometrics and Intro to Throwing   []  Stage 4: Sport specific Training/Return to Sport     []  Ready to Return to Play, [employer]   []  Not Ready for Return to Sports   Comments:      Treatment/Activity Tolerance:  [x] Patient tolerated treatment well [] Patient limited by fatique  [] Patient limited by pain  [] Patient limited by other medical complications  [] Other:     Overall Progression Towards Functional goals/ Treatment Progress Update:  [] Patient is progressing as expected towards functional goals listed. [] Progression is slowed due to complexities/Impairments listed. [] Progression has been slowed due to co-morbidities.   [x] Plan just implemented, too soon to assess goals progression <30days   [] Goals require adjustment due to lack of progress  [] Patient is not progressing as expected and requires additional follow up with physician  [] Other    Prognosis for POC: [x] Good [] Fair  [] Poor    Patient requires continued skilled intervention: [x] Yes  [] No      PLAN: See eval  [] Continue per plan of care [] Alter current plan (see comments)  [x] Plan of care initiated [] Hold pending MD visit [] Discharge    Electronically signed by: Nette Forrest PT     Note: If patient does not return for scheduled/recommended follow up visits, this note will serve as a discharge from care along with the most recent update on progress.

## 2021-12-03 ENCOUNTER — HOSPITAL ENCOUNTER (OUTPATIENT)
Dept: PHYSICAL THERAPY | Age: 46
Setting detail: THERAPIES SERIES
Discharge: HOME | End: 2021-12-03
Payer: COMMERCIAL

## 2021-12-03 PROCEDURE — 97140 MANUAL THERAPY 1/> REGIONS: CPT

## 2021-12-03 NOTE — FLOWSHEET NOTE
[address] [place] [address] [place] [address]  Phone: [phone] Fax: [phone]    Physical Therapy Treatment Note/ Progress Report:     Date:  12/3/2021    Patient Name:  Subha Jansen    :  1975  MRN: [id number]  Restrictions/Precautions:    Medical/Treatment Diagnosis Information:  · Diagnosis: L shoulder arthroscopy, SAD, DCE, shoulder pain M25. 512  · Treatment Diagnosis: L shoulder arthroscopy, SAD, DCE, shoulder pain M25. 111  Insurance/Certification information:     Physician Information:  Referring Practitioner: Dr Devendra Cavanaugh of care signed (Y/N):     Date of Patient follow up with Physician:      Progress Report: []  Yes  [x]  No     Date Range for reporting period:  Beginnin21  Ending:       Progress report due (10 Rx/or 30 days whichever is less):       Recertification due (POC duration/ or 90 days whichever is less):       Visit # Insurance Allowable Auth Needed   3 40 []Yes    []No     Pain level:  0-8/10     SUBJECTIVE:  Pt reports that she is feeling most of her soreness through the top of the shoulder, front of the upper arm and into the forearm. Pt states that she had a fall since surgery where she caught herself with the L hand with a straight elbow. Pt is having some aching in the elbow at night, so she is wondering if she ilana that when she fell. HEP going well. Pt notices that she gets fatigued at work, especially when pulling out to the side with the L arm. Pt has had some numbness in her middle finger since the fall. OBJECTIVE: See eval   Observation:    Test measurements:      RESTRICTIONS/PRECAUTIONS: L shoulder arthroscopy 21.  L lower cervical radic    Exercises/Interventions:   Therapeutic Ex (26237)  Min: Sets/sec Reps CUES/Notes   UBE      Pendulum/Ball rolls      Cane AAROM flex/press 1 12 Flex/  ER   3 way Isomet 5 sec 12 ER/IR   T- band Row/pinch      T- band lower pinch T- band ER activation      Supine SA punch      SL ER/SL punch      Prone Rows/ext      Prone HAB/Prone Flex      Seat Table slides/Wall Slides      Seated HH Depression      No Money      Scap Wall Lat touches/wall walks            Standing flex/scap      Standing Punch      Lawnmower                              Manual Intervention  (00093)  Min: 25 min      Shld /GH Mobs 1 4 light   STM 1 10 Wrist extensors tendons, bicep, anterior deltoid   Post Cap mobs      Thoracic/Rib manipualtion      CT MT/Mobs min  sidely PA C6   PROM MT 5 min  L shoulder   Elbow mobs 1 2    R UT 1 4    NMR re-education (36610)  Min:      T-spine Ext      GH depress/compress      Scap/GH NMR      Body blade      Wall ball roll      Wall Ball bounce      Ball drops      Dionne Scap Bio      Floor Snow angels-sliders            Therapeutic Activity (23783)  Min:      UE throwing porgression      Dynamic UE stability      Earthquake Bar      Bodyblade                Therapeutic Exercise and NMR EXR  [x] (49961) Provided verbal/tactile cueing for activities related to strengthening, flexibility, endurance, ROM  for improvements in scapular, scapulothoracic and UE control with self care, reaching, carrying, lifting, house/yardwork, driving/computer work.    [] (90894) Provided verbal/tactile cueing for activities related to improving balance, coordination, kinesthetic sense, posture, motor skill, proprioception  to assist with  scapular, scapulothoracic and UE control with self care, reaching, carrying, lifting, house/yardwork, driving/computer work. Therapeutic Activities:    [] (79704 or 71123) Provided verbal/tactile cueing for activities related to improving balance, coordination, kinesthetic sense, posture, motor skill, proprioception and motor activation to allow for proper function of scapular, scapulothoracic and UE control with self care, carrying, lifting, driving/computer work.      Home Exercise Program:    [x] (46317) Reviewed/Progressed HEP activities related to strengthening, flexibility, endurance, ROM of scapular, scapulothoracic and UE control with self care, reaching, carrying, lifting, house/yardwork, driving/computer work  [] (23569) Reviewed/Progressed HEP activities related to improving balance, coordination, kinesthetic sense, posture, motor skill, proprioception of scapular, scapulothoracic and UE control with self care, reaching, carrying, lifting, house/yardwork, driving/computer work      Manual Treatments:  PROM / STM / Oscillations-Mobs:  G-I, II, III, IV (PA's, Inf., Post.)  [x] (89052) Provided manual therapy to mobilize soft tissue/joints of cervical/CT, scapular GHJ and UE for the purpose of modulating pain, promoting relaxation,  increasing ROM, reducing/eliminating soft tissue swelling/inflammation/restriction, improving soft tissue extensibility and allowing for proper ROM for normal function with self care, reaching, carrying, lifting, house/yardwork, driving/computer work    Modalities:      Charges:  Timed Code Treatment Minutes: 30   Total Treatment Minutes: 35       [] EVAL (LOW) 37394 (typically 20 minutes face-to-face)  [] EVAL (MOD) 66153 (typically 30 minutes face-to-face)  [] EVAL (HIGH) 35891 (typically 45 minutes face-to-face)  [] RE-EVAL     [] JZ(71895) x     [] DRY NEEDLE 1 OR 2 MUSCLES  [] NMR (04096) x     [] DRY NEEDLE 3+ MUSCLES  [x] Manual (38774) x  2     [] TA (68294) x     [] [facility] Traction (19760)  [] ES(attended) (72601)     [] ES (un) ([id number]):   [] VASO (29732)  [] Other:    If BW Please Indicate Time In/Out  CPT Code Time in Time out                                   GOALS:    GOALS:  Patient stated goal: work pain free  [] Progressing: [] Met: [] Not Met: [] Adjusted    Therapist goals for Patient:   Short Term Goals: To be achieved in: 2 weeks  1. Independent in HEP and progression per patient tolerance, in order to prevent re-injury.    [] Progressing: [] Met: [] Not Met: [] Adjusted  2. Patient will have a decrease in pain to facilitate improvement in movement, function, and ADLs as indicated by Functional Deficits. [] Progressing: [] Met: [] Not Met: [] Adjusted    Long Term Goals: To be achieved in: 10 weeks  1. Disability index score of 10% or less for the Quick DASH to assist with reaching prior level of function. [] Progressing: [] Met: [] Not Met: [] Adjusted  2. Patient will demonstrate increased AROM to [facility] to allow for proper joint functioning as indicated by Functional Deficits. [] Progressing: [] Met: [] Not Met: [] Adjusted  3. Patient will demonstrate an increase in NM recruitment/activation and overall GH and scapular strength to within n5lbs HHD or WNL for proper functional mobility as indicated by patients Functional Deficits. [] Progressing: [] Met: [] Not Met: [] Adjusted  4. Patient will return to working out without pain  activities without increased symptoms or restriction. [] Progressing: [] Met: [] Not Met: [] Adjusted  5. No pain with sleeping(patient specific functional goal)     [] Progressing: [] Met: [] Not Met: [] Adjusted    ASSESSMENT:  Good tolerance to manual therapy. Muscle tightness and guarding noted through upper trap, upper arm, and wrist extensor tendons. 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       Return to Play: (if applicable)   []  Stage 1: Intro to Strength   []  Stage 2: Dynamic Strength and Intro to Plyometrics   []  Stage 3: Advanced Plyometrics and Intro to Throwing   []  Stage 4: Sport specific Training/Return to Sport     []  Ready to Return to Play, [employer]   []  Not Ready for Return to Sports   Comments:      Treatment/Activity Tolerance:  [x] Patient tolerated treatment well [] Patient limited by fatique  [] Patient limited by pain  [] Patient limited by other medical complications  [] Other:     Overall Progression Towards Functional goals/ Treatment Progress Update:  [] Patient is progressing as expected towards functional goals listed. [] Progression is slowed due to complexities/Impairments listed. [] Progression has been slowed due to co-morbidities. [x] Plan just implemented, too soon to assess goals progression <30days   [] Goals require adjustment due to lack of progress  [] Patient is not progressing as expected and requires additional follow up with physician  [] Other    Prognosis for POC: [x] Good [] Fair  [] Poor    Patient requires continued skilled intervention: [x] Yes  [] No      PLAN: See eval  [x] Continue per plan of care [] Alter current plan (see comments)  [] Plan of care initiated [] Hold pending MD visit [] Discharge    Electronically signed by: Saqib Sousa PTA     Note: If patient does not return for scheduled/recommended follow up visits, this note will serve as a discharge from care along with the most recent update on progress.

## 2021-12-06 ENCOUNTER — HOSPITAL ENCOUNTER (OUTPATIENT)
Dept: PHYSICAL THERAPY | Age: 46
Setting detail: THERAPIES SERIES
Discharge: HOME | End: 2021-12-06
Payer: COMMERCIAL

## 2021-12-06 PROCEDURE — 97110 THERAPEUTIC EXERCISES: CPT

## 2021-12-06 PROCEDURE — 97140 MANUAL THERAPY 1/> REGIONS: CPT

## 2021-12-06 NOTE — FLOWSHEET NOTE
Manual Intervention  (69883)  Min: 25 min      Shld /GH Mobs 5'  Inferior/multiangle   STM      Post Cap mobs      Thoracic/Rib manipualtion 5'     CT MT/Mobs 5'     PROM MT 10'  L shoulder   Elbow mobs            NMR re-education (34895)  Min:      T-spine Ext      GH depress/compress      Scap/GH NMR      Body blade      Wall ball roll      Wall Ball bounce      Ball drops      Dionne Scap Bio      Floor Snow angels-sliders            Therapeutic Activity (30490)  Min:      UE throwing porgression      Dynamic UE stability      Earthquake Bar      Bodyblade                Therapeutic Exercise and NMR EXR  [x] (20088) Provided verbal/tactile cueing for activities related to strengthening, flexibility, endurance, ROM  for improvements in scapular, scapulothoracic and UE control with self care, reaching, carrying, lifting, house/yardwork, driving/computer work.    [] (02186) Provided verbal/tactile cueing for activities related to improving balance, coordination, kinesthetic sense, posture, motor skill, proprioception  to assist with  scapular, scapulothoracic and UE control with self care, reaching, carrying, lifting, house/yardwork, driving/computer work. Therapeutic Activities:    [] (67432 or 46796) Provided verbal/tactile cueing for activities related to improving balance, coordination, kinesthetic sense, posture, motor skill, proprioception and motor activation to allow for proper function of scapular, scapulothoracic and UE control with self care, carrying, lifting, driving/computer work.      Home Exercise Program:    [x] (12466) Reviewed/Progressed HEP activities related to strengthening, flexibility, endurance, ROM of scapular, scapulothoracic and UE control with self care, reaching, carrying, lifting, house/yardwork, driving/computer work  [] (07664) Reviewed/Progressed HEP activities related to improving balance, coordination, kinesthetic sense, posture, motor skill, proprioception of scapular, scapulothoracic and UE control with self care, reaching, carrying, lifting, house/yardwork, driving/computer work      Manual Treatments:  PROM / STM / Oscillations-Mobs:  G-I, II, III, IV (PA's, Inf., Post.)  [x] (82270) Provided manual therapy to mobilize soft tissue/joints of cervical/CT, scapular GHJ and UE for the purpose of modulating pain, promoting relaxation,  increasing ROM, reducing/eliminating soft tissue swelling/inflammation/restriction, improving soft tissue extensibility and allowing for proper ROM for normal function with self care, reaching, carrying, lifting, house/yardwork, driving/computer work    Modalities:      Charges:  Timed Code Treatment Minutes: 45   Total Treatment Minutes: 45       [] EVAL (LOW) 59219 (typically 20 minutes face-to-face)  [] EVAL (MOD) 89892 (typically 30 minutes face-to-face)  [] EVAL (HIGH) 93425 (typically 45 minutes face-to-face)  [] RE-EVAL     [x] MM(04294) x  1   [] DRY NEEDLE 1 OR 2 MUSCLES  [] NMR (11492) x     [] DRY NEEDLE 3+ MUSCLES  [x] Manual (11255) x  2     [] TA (40020) x     [] [employer] Traction (86142)  [] ES(attended) (62995)     [] ES (un) (30880):   [] VASO (78697)  [] Other:    If BWC Please Indicate Time In/Out  CPT Code Time in Time out                                     GOALS:  Patient stated goal: work pain free  [] Progressing: [] Met: [] Not Met: [] Adjusted    Therapist goals for Patient:   Short Term Goals: To be achieved in: 2 weeks  1. Independent in HEP and progression per patient tolerance, in order to prevent re-injury. [] Progressing: [] Met: [] Not Met: [] Adjusted  2. Patient will have a decrease in pain to facilitate improvement in movement, function, and ADLs as indicated by Functional Deficits. [] Progressing: [] Met: [] Not Met: [] Adjusted    Long Term Goals: To be achieved in: 10 weeks  1. Disability index score of 10% or less for the Quick DASH to assist with reaching prior level of function.    [] Progressing: [] Met: [] Not Met: [] Adjusted  2. Patient will demonstrate increased AROM to [facility] to allow for proper joint functioning as indicated by Functional Deficits. [] Progressing: [] Met: [] Not Met: [] Adjusted  3. Patient will demonstrate an increase in NM recruitment/activation and overall GH and scapular strength to within n5lbs HHD or WNL for proper functional mobility as indicated by patients Functional Deficits. [] Progressing: [] Met: [] Not Met: [] Adjusted  4. Patient will return to working out without pain  activities without increased symptoms or restriction. [] Progressing: [] Met: [] Not Met: [] Adjusted  5. No pain with sleeping(patient specific functional goal)     [] Progressing: [] Met: [] Not Met: [] Adjusted    ASSESSMENT:  Improvement in shoulder symptoms at conclusion, though no change in LUE symptoms. Left shoulder guarded with PROM. Able to tolerate theraband ER/IR, added to HEP. Continued deficits in shoulder PROM/AROM, cervicothoracic mobility, rotator cuff strength, and periscapular strength. Return to Play: (if applicable)   []  Stage 1: Intro to Strength   []  Stage 2: Dynamic Strength and Intro to Plyometrics   []  Stage 3: Advanced Plyometrics and Intro to Throwing   []  Stage 4: Sport specific Training/Return to Sport     []  Ready to Return to Play, [employer]   []  Not Ready for Return to Sports   Comments:      Treatment/Activity Tolerance:  [x] Patient tolerated treatment well [] Patient limited by fatique  [] Patient limited by pain  [] Patient limited by other medical complications  [] Other:     Overall Progression Towards Functional goals/ Treatment Progress Update:  [] Patient is progressing as expected towards functional goals listed. [] Progression is slowed due to complexities/Impairments listed. [] Progression has been slowed due to co-morbidities.   [x] Plan just implemented, too soon to assess goals progression <30days   [] Goals require adjustment due to lack of progress  [] Patient is not progressing as expected and requires additional follow up with physician  [] Other    Prognosis for POC: [x] Good [] Fair  [] Poor    Patient requires continued skilled intervention: [x] Yes  [] No      PLAN:   [x] Continue per plan of care [] Alter current plan (see comments)  [] Plan of care initiated [] Hold pending MD visit [] Discharge    Electronically signed by: Clint Mcgarry PT     Note: If patient does not return for scheduled/recommended follow up visits, this note will serve as a discharge from care along with the most recent update on progress.

## 2021-12-10 ENCOUNTER — HOSPITAL ENCOUNTER (OUTPATIENT)
Dept: PHYSICAL THERAPY | Age: 46
Setting detail: THERAPIES SERIES
Discharge: HOME | End: 2021-12-10
Payer: COMMERCIAL

## 2021-12-10 PROCEDURE — 97032 APPL MODALITY 1+ESTIM EA 15: CPT

## 2021-12-10 PROCEDURE — 97140 MANUAL THERAPY 1/> REGIONS: CPT

## 2021-12-10 PROCEDURE — 20560 NDL INSJ W/O NJX 1 OR 2 MUSC: CPT

## 2021-12-10 NOTE — FLOWSHEET NOTE
[address] [place] [address] [place] [address]  Phone: [phone] Fax: [phone]    Physical Therapy Treatment Note/ Progress Report:     Date:  12/10/2021    Patient Name:  Taylor Brewer    :  1975  MRN: [id number]  Restrictions/Precautions:    Medical/Treatment Diagnosis Information:  · Diagnosis: L shoulder arthroscopy, SAD, DCE, shoulder pain M25. 512  · Treatment Diagnosis: L shoulder arthroscopy, SAD, DCE, shoulder pain M25. 745  Insurance/Certification information:     Physician Information:  Referring Practitioner: Dr Marv Cervantes of care signed (Y/N):     Date of Patient follow up with Physician:      Progress Report: []  Yes  [x]  No     Date Range for reporting period:  Beginnin21  Ending:       Progress report due (10 Rx/or 30 days whichever is less):       Recertification due (POC duration/ or 90 days whichever is less):       Visit # Insurance Allowable Auth Needed   4 40 []Yes    []No     Pain level:  3/10     SUBJECTIVE:  Pt states recent flare up in the shoulder, shoulder blade. Able to get pain down arm into middle finger, especially with cervical retraction. Difficulty sleeping. OBJECTIVE:    Observation:    Test measurements:      RESTRICTIONS/PRECAUTIONS: L shoulder arthroscopy 21.  L lower cervical radic    Exercises/Interventions:   Therapeutic Ex (22448)  Min: HOLD Sets/sec Reps CUES/Notes   UBE 4'  1/2 retro   Seated SB flexion stretch 10 10    Cane AAROM flex/press 1 12 Flex/  ER   3 way Isomet 5 sec 12 ER/IR   T- band Row/pinch   add   T- band lower pinch   add   T- band ER/IR  1 12 red   Supine SA punch      SL ER/SL punch   add   Prone Rows/ext   add   Prone HAB/Prone Flex      Seat Table slides/Wall Slides   add   Seated HH Depression      No Money      Scap Wall Lat touches/wall walks            Standing flex/scap      Standing Punch      Lawnmower                              Manual Intervention  (36846)  Min: 21 min      Shld /GH Mobs 8'  Inferior/multiangle   STM 5     Post Cap mobs      Thoracic/Rib manipualtion      CT MT/Mobs      PROM MT 8'  L shoulder   Elbow mobs            NMR re-education (28211)  Min:      T-spine Ext      GH depress/compress      Scap/GH NMR      Body blade      Wall ball roll      Wall Ball bounce      Ball drops      Dionne Scap Bio      Floor Snow angels-sliders            Therapeutic Activity (57304)  Min:      UE throwing porgression      Dynamic UE stability      Earthquake Bar      Bodyblade                Therapeutic Exercise and NMR EXR  [x] (41185) Provided verbal/tactile cueing for activities related to strengthening, flexibility, endurance, ROM  for improvements in scapular, scapulothoracic and UE control with self care, reaching, carrying, lifting, house/yardwork, driving/computer work.    [] (24574) Provided verbal/tactile cueing for activities related to improving balance, coordination, kinesthetic sense, posture, motor skill, proprioception  to assist with  scapular, scapulothoracic and UE control with self care, reaching, carrying, lifting, house/yardwork, driving/computer work. Therapeutic Activities:    [] (52374 or 02403) Provided verbal/tactile cueing for activities related to improving balance, coordination, kinesthetic sense, posture, motor skill, proprioception and motor activation to allow for proper function of scapular, scapulothoracic and UE control with self care, carrying, lifting, driving/computer work.      Home Exercise Program:    [x] (45060) Reviewed/Progressed HEP activities related to strengthening, flexibility, endurance, ROM of scapular, scapulothoracic and UE control with self care, reaching, carrying, lifting, house/yardwork, driving/computer work  [] (16551) Reviewed/Progressed HEP activities related to improving balance, coordination, kinesthetic sense, posture, motor skill, proprioception of scapular, scapulothoracic and UE control with self care, reaching, carrying, lifting, house/yardwork, driving/computer work      Manual Treatments:  PROM / STM / Oscillations-Mobs:  G-I, II, III, IV (PA's, Inf., Post.)  [x] (43941) Provided manual therapy to mobilize soft tissue/joints of cervical/CT, scapular GHJ and UE for the purpose of modulating pain, promoting relaxation,  increasing ROM, reducing/eliminating soft tissue swelling/inflammation/restriction, improving soft tissue extensibility and allowing for proper ROM for normal function with self care, reaching, carrying, lifting, house/yardwork, driving/computer work    Spoke with   regarding the use of Dry Needling     Dry needling manual therapy: consisted on the placement of 6 needles in the following muscles:  UT, C7-8, CTJ, casimiro scap. A 50-60 mm needle was inserted, piston, rotated, and coned to produce intramuscular mobilization. These techniques were used to restore functional range of motion, reduce muscle spasm and induce healing in the corresponding musculature. (09787)  Clean Technique was utilized today while applying Dry needling treatment. The treatment sites where cleaned with 70% solution of  isopropyl alcohol . The PT washed their hands and utilized treatment gloves along with hand  prior to inserting the needles. All needles where removed and discarded in the appropriate sharps container. MD has given verbal and/or written approval for this treatment. Attended low frequency (1-20Hz) electrical stimulation was utilized in conjunction with Dry Needling:  the Estim was manipulated between all above needles for a period of 10 min. at 5 volts. The low frequency electrical stimulation was used to help reduce muscle spasm and help to interrupt /[place] the pain cycle.  (04679)     Modalities:      Charges:  Timed Code Treatment Minutes: 21   Total Treatment Minutes: 45       [] EVAL (LOW) 75136 (typically 20 minutes face-to-face)  [] EVAL (MOD) 89913 (typically 30 minutes face-to-face)  [] EVAL (HIGH) 00397 (typically 45 minutes face-to-face)  [] RE-EVAL     [] GZ(97046) x    [] DRY NEEDLE 1 OR 2 MUSCLES  [] NMR (68633) x     [x] DRY NEEDLE 3+ MUSCLES  [x] Manual (05547) x       [] TA (30497) x     [] Mech Traction (33728)  [x] ES(attended) (52416)     [] ES (un) (86626):   [] VASO (26924)  [] Other:    If BWC Please Indicate Time In/Out  CPT Code Time in Time out                                     GOALS:  Patient stated goal: work pain free  [] Progressing: [] Met: [] Not Met: [] Adjusted    Therapist goals for Patient:   Short Term Goals: To be achieved in: 2 weeks  1. Independent in HEP and progression per patient tolerance, in order to prevent re-injury. [] Progressing: [] Met: [] Not Met: [] Adjusted  2. Patient will have a decrease in pain to facilitate improvement in movement, function, and ADLs as indicated by Functional Deficits. [] Progressing: [] Met: [] Not Met: [] Adjusted    Long Term Goals: To be achieved in: 10 weeks  1. Disability index score of 10% or less for the Quick DASH to assist with reaching prior level of function. [] Progressing: [] Met: [] Not Met: [] Adjusted  2. Patient will demonstrate increased AROM to [facility] to allow for proper joint functioning as indicated by Functional Deficits. [] Progressing: [] Met: [] Not Met: [] Adjusted  3. Patient will demonstrate an increase in NM recruitment/activation and overall GH and scapular strength to within n5lbs HHD or WNL for proper functional mobility as indicated by patients Functional Deficits. [] Progressing: [] Met: [] Not Met: [] Adjusted  4. Patient will return to working out without pain  activities without increased symptoms or restriction. [] Progressing: [] Met: [] Not Met: [] Adjusted  5. No pain with sleeping(patient specific functional goal)     [] Progressing: [] Met: [] Not Met: [] Adjusted    ASSESSMENT: Seems to have pain associated with cervical and CTJ radic.  Did well with DN to scapular area. Return to Play: (if applicable)   []  Stage 1: Intro to Strength   []  Stage 2: Dynamic Strength and Intro to Plyometrics   []  Stage 3: Advanced Plyometrics and Intro to Throwing   []  Stage 4: Sport specific Training/Return to Sport     []  Ready to Return to Play, [employer]   []  Not Ready for Return to Sports   Comments:      Treatment/Activity Tolerance:  [x] Patient tolerated treatment well [] Patient limited by fatique  [] Patient limited by pain  [] Patient limited by other medical complications  [] Other:     Overall Progression Towards Functional goals/ Treatment Progress Update:  [x] Patient is progressing as expected towards functional goals listed. [] Progression is slowed due to complexities/Impairments listed. [] Progression has been slowed due to co-morbidities. [] Plan just implemented, too soon to assess goals progression <30days   [] Goals require adjustment due to lack of progress  [] Patient is not progressing as expected and requires additional follow up with physician  [] Other    Prognosis for POC: [x] Good [] Fair  [] Poor    Patient requires continued skilled intervention: [x] Yes  [] No      PLAN:  manual, DN, postural progression  [x] Continue per plan of care [] Alter current plan (see comments)  [] Plan of care initiated [] Hold pending MD visit [] Discharge    Electronically signed by: Pedro Luis Monroy PT     Note: If patient does not return for scheduled/recommended follow up visits, this note will serve as a discharge from care along with the most recent update on progress.

## 2021-12-14 ENCOUNTER — HOSPITAL ENCOUNTER (OUTPATIENT)
Dept: PHYSICAL THERAPY | Age: 46
Setting detail: THERAPIES SERIES
Discharge: HOME | End: 2021-12-14
Payer: COMMERCIAL

## 2021-12-14 PROCEDURE — 97140 MANUAL THERAPY 1/> REGIONS: CPT

## 2021-12-15 NOTE — FLOWSHEET NOTE
[address] [place] [address]  Phone: [phone] Fax: [phone]    Physical Therapy Treatment Note/ Progress Report:     Date:  12/15/2021    Patient Name:  Rodo Dawson    :  1975  MRN: [id number]  Restrictions/Precautions:    Medical/Treatment Diagnosis Information:  · Diagnosis: L shoulder arthroscopy, SAD, DCE, shoulder pain M25. 512  · Treatment Diagnosis: L shoulder arthroscopy, SAD, DCE, shoulder pain M25. 581  Insurance/Certification information:     Physician Information:  Referring Practitioner: Dr Ahmadi [facility] of care signed (Y/N):     Date of Patient follow up with Physician:      Progress Report: []  Yes  [x]  No     Date Range for reporting period:  Beginnin21  Ending:       Progress report due (10 Rx/or 30 days whichever is less):       Recertification due (POC duration/ or 90 days whichever is less):       Visit # Insurance Allowable Auth Needed   5 40 []Yes    []No     Pain level:  3/10     SUBJECTIVE:  Pt states the DN from last visit did not really change any of her symptoms. Still having referring pain into shoulder blade and radiating pain down in the the middle finger w/ tingling and sometimes noticing her hand going cold. Feels very sensitive through entire arm. OBJECTIVE:    Observation: restricted L cervical rotation to 60 deg w/ referring shoulder blade pain and worsening of middle finger numbness; limited mobility L CTJ/T1/T2   Test measurements:      RESTRICTIONS/PRECAUTIONS: L shoulder arthroscopy 21.  L lower cervical radic    Exercises/Interventions:   Therapeutic Ex (40478)  Min: HOLD Sets/sec Reps CUES/Notes   UBE 4'  1/2 retro   Seated SB flexion stretch 10 10    Cane AAROM flex/press 1 12 Flex/  ER   3 way Isomet 5 sec 12 ER/IR   T- band Row/pinch   add   T- band lower pinch   add   T- band ER/IR  1 12 red   Supine SA punch      SL ER/SL punch   add   Prone Rows/ext add   Prone HAB/Prone Flex      Seat Table slides/Wall Slides   add   Seated HH Depression      No Money      Scap Wall Lat touches/wall walks            Standing flex/scap      Standing Punch      Lawnmower                              Manual Intervention  (78538)  Min: 25 min      Shld /GH Mobs 8'  Inferior/multiangle   STM 5  L forearm/ L UT/levator   Post Cap mobs      Thoracic/Rib manipualtion 10'  Prone upper thoracic/CT inferior mobs; Prone CT   CT MT/Mobs      PROM MT   L shoulder   Elbow mobs            NMR re-education (22349)  Min:      T-spine Ext      GH depress/compress      Scap/GH NMR      Body blade      Wall ball roll      Wall Ball bounce      Ball drops      Dionne Scap Bio      Floor Snow angels-sliders            Therapeutic Activity (74536)  Min:      UE throwing porgression      Dynamic UE stability      Earthquake Bar      Bodyblade                Therapeutic Exercise and NMR EXR  [x] (19966) Provided verbal/tactile cueing for activities related to strengthening, flexibility, endurance, ROM  for improvements in scapular, scapulothoracic and UE control with self care, reaching, carrying, lifting, house/yardwork, driving/computer work.    [] (54777) Provided verbal/tactile cueing for activities related to improving balance, coordination, kinesthetic sense, posture, motor skill, proprioception  to assist with  scapular, scapulothoracic and UE control with self care, reaching, carrying, lifting, house/yardwork, driving/computer work. Therapeutic Activities:    [] (17763 or 55589) Provided verbal/tactile cueing for activities related to improving balance, coordination, kinesthetic sense, posture, motor skill, proprioception and motor activation to allow for proper function of scapular, scapulothoracic and UE control with self care, carrying, lifting, driving/computer work.      Home Exercise Program:    [x] (71170) Reviewed/Progressed HEP activities related to strengthening, flexibility, endurance, ROM of scapular, scapulothoracic and UE control with self care, reaching, carrying, lifting, house/yardwork, driving/computer work  [] (75538) Reviewed/Progressed HEP activities related to improving balance, coordination, kinesthetic sense, posture, motor skill, proprioception of scapular, scapulothoracic and UE control with self care, reaching, carrying, lifting, house/yardwork, driving/computer work      Manual Treatments:  PROM / STM / Oscillations-Mobs:  G-I, II, III, IV (PA's, Inf., Post.)  [x] (94904) Provided manual therapy to mobilize soft tissue/joints of cervical/CT, scapular GHJ and UE for the purpose of modulating pain, promoting relaxation,  increasing ROM, reducing/eliminating soft tissue swelling/inflammation/restriction, improving soft tissue extensibility and allowing for proper ROM for normal function with self care, reaching, carrying, lifting, house/yardwork, driving/computer work    Spoke with   regarding the use of Dry Needling     Dry needling manual therapy: consisted on the placement of 6 needles in the following muscles:  UT, C7-8, CTJ, casimiro scap. A 50-60 mm needle was inserted, piston, rotated, and coned to produce intramuscular mobilization. These techniques were used to restore functional range of motion, reduce muscle spasm and induce healing in the corresponding musculature. (61344)  Clean Technique was utilized today while applying Dry needling treatment. The treatment sites where cleaned with 70% solution of  isopropyl alcohol . The PT washed their hands and utilized treatment gloves along with hand  prior to inserting the needles. All needles where removed and discarded in the appropriate sharps container. MD has given verbal and/or written approval for this treatment. Attended low frequency (1-20Hz) electrical stimulation was utilized in conjunction with Dry Needling:  the Estim was manipulated between all above needles for a period of 10 min. working out without pain  activities without increased symptoms or restriction. [] Progressing: [] Met: [] Not Met: [] Adjusted  5. No pain with sleeping(patient specific functional goal)     [] Progressing: [] Met: [] Not Met: [] Adjusted    ASSESSMENT: Seems to have pain associated with cervical and CTJ radic, pt had significant improvement in L cervical rotation without increased finger symptoms following prone thoracic mobilizations and prone CT manipulation, also with minimal scapular radicular symptoms. Pt still had forearm and shoulder pain at end of session. Demonstrates compensatory patterns w/ L arm elevation likely contributing to scap and cervical pain. Will assess response at next session to determine dosage of manual at neck and shoulder and potential continued DN. Return to Play: (if applicable)   []  Stage 1: Intro to Strength   []  Stage 2: Dynamic Strength and Intro to Plyometrics   []  Stage 3: Advanced Plyometrics and Intro to Throwing   []  Stage 4: Sport specific Training/Return to Sport     []  Ready to Return to Play, [employer]   []  Not Ready for Return to Sports   Comments:      Treatment/Activity Tolerance:  [x] Patient tolerated treatment well [] Patient limited by fatique  [] Patient limited by pain  [] Patient limited by other medical complications  [] Other:     Overall Progression Towards Functional goals/ Treatment Progress Update:  [x] Patient is progressing as expected towards functional goals listed. [] Progression is slowed due to complexities/Impairments listed. [] Progression has been slowed due to co-morbidities.   [] Plan just implemented, too soon to assess goals progression <30days   [] Goals require adjustment due to lack of progress  [] Patient is not progressing as expected and requires additional follow up with physician  [] Other    Prognosis for POC: [x] Good [] Fair  [] Poor    Patient requires continued skilled intervention: [x] Yes  [] No      PLAN:  manual, DN, postural progression  [x] Continue per plan of care [] Alter current plan (see comments)  [] Plan of care initiated [] Hold pending MD visit [] Discharge    Electronically signed by: Cathay Denver, PT     Note: If patient does not return for scheduled/recommended follow up visits, this note will serve as a discharge from care along with the most recent update on progress.

## 2021-12-16 ENCOUNTER — APPOINTMENT (OUTPATIENT)
Dept: PHYSICAL THERAPY | Age: 46
End: 2021-12-16
Payer: COMMERCIAL

## 2021-12-22 ENCOUNTER — HOSPITAL ENCOUNTER (OUTPATIENT)
Dept: PHYSICAL THERAPY | Age: 46
Setting detail: THERAPIES SERIES
Discharge: HOME | End: 2021-12-22
Payer: COMMERCIAL

## 2021-12-22 PROCEDURE — 97110 THERAPEUTIC EXERCISES: CPT

## 2021-12-22 PROCEDURE — 97140 MANUAL THERAPY 1/> REGIONS: CPT

## 2021-12-22 NOTE — FLOWSHEET NOTE
[address] [place] [address]  Phone: [phone] Fax: [phone]    Physical Therapy Treatment Note/ Progress Report:     Date:  2021    Patient Name:  Aliya England    :  1975  MRN: [id number]  Restrictions/Precautions:    Medical/Treatment Diagnosis Information:  · Diagnosis: L shoulder arthroscopy, SAD, DCE, shoulder pain M25. 512  · Treatment Diagnosis: L shoulder arthroscopy, SAD, DCE, shoulder pain M25. 951  Insurance/Certification information:     Physician Information:  Referring Practitioner: Dr Subhash Rodríguez of care signed (Y/N):     Date of Patient follow up with Physician:      Progress Report: []  Yes  [x]  No     Date Range for reporting period:  Beginnin21  Ending:       Progress report due (10 Rx/or 30 days whichever is less):       Recertification due (POC duration/ or 90 days whichever is less):       Visit # Insurance Allowable Auth Needed   6 40 []Yes    []No     Pain level:  5-6/10     SUBJECTIVE:  Patient has been very sore and achy in the front of the L shoulder with various work tasks. Pain radiates down into the L bicep all the way to the medial elbow. Does intermittently have sharp, shooting N/T from the medial elbow to the middle finger with certain movements. Sometimes noticing her L hand going cold. OBJECTIVE:    Observation: restricted L cervical rotation to 60 deg w/ referring shoulder blade pain and worsening of middle finger numbness; limited mobility L CTJ/T1/T2   Test measurements:      RESTRICTIONS/PRECAUTIONS: L shoulder arthroscopy 21.  L lower cervical radic    Exercises/Interventions:   Therapeutic Ex (67838) Sets/sec Reps CUES/Notes   UBE  4 min 1/2 retro   Seated SB flexion stretch 10 10    Cane AAROM flexion - supine  Seated ER 10s 10 Each position   Cross body adduction stretch 10s 10 Low elbow height   IR towel stretch behind back 10s 10    3 way Isomet 5 sec 12 ER/IR   T- band Row/pinch      T- band lower pinch      T- band ER/IR  1 12 red   Supine SA punch      SL ER/SL punch      Prone Rows/ext      Prone HAB/Prone Flex      Seat Table Slides - abduction 10s 10    Wall slides - flexion 10s 10    Seated HH Depression      No Money      Scap Wall Lat touches/wall walks            Standing flex/scap      Standing Punch      Lawnmower                              Manual Intervention  (01057)      L GH inferior and posterior glides  5 min Gr. II-III   L posterior capsule stretch - prone, towel roll under anterior shoulder  5 min Gr. II-III   L shoulder PROM - flex, abd, ER  5 min    L scapular pin and stretch - OH flex  5 min    CT junction, T/S mobs/manips  5 min Gr. III-IV, Gr. V                     NMR Re-education (18049)      T-spine Ext      GH depress/compress      Scap/GH NMR      Body blade      Wall ball roll      Wall Ball bounce      Ball drops      Dionne Scap Bio      Floor Snow angels-sliders            Therapeutic Activity (82358)      UE throwing porgression      Dynamic UE stability      Earthquake Bar      Bodyblade                Therapeutic Exercise and NMR EXR  [x] (06111) Provided verbal/tactile cueing for activities related to strengthening, flexibility, endurance, ROM  for improvements in scapular, scapulothoracic and UE control with self care, reaching, carrying, lifting, house/yardwork, driving/computer work.    [] (18002) Provided verbal/tactile cueing for activities related to improving balance, coordination, kinesthetic sense, posture, motor skill, proprioception  to assist with  scapular, scapulothoracic and UE control with self care, reaching, carrying, lifting, house/yardwork, driving/computer work.     Therapeutic Activities:    [] (38566 or 96970) Provided verbal/tactile cueing for activities related to improving balance, coordination, kinesthetic sense, posture, motor skill, proprioception and motor activation to allow for proper re-injury. [] Progressing: [] Met: [] Not Met: [] Adjusted  2. Patient will have a decrease in pain to facilitate improvement in movement, function, and ADLs as indicated by Functional Deficits. [] Progressing: [] Met: [] Not Met: [] Adjusted    Long Term Goals: To be achieved in: 10 weeks  1. Disability index score of 10% or less for the Quick DASH to assist with reaching prior level of function. [] Progressing: [] Met: [] Not Met: [] Adjusted  2. Patient will demonstrate increased AROM to [facility] to allow for proper joint functioning as indicated by Functional Deficits. [] Progressing: [] Met: [] Not Met: [] Adjusted  3. Patient will demonstrate an increase in NM recruitment/activation and overall GH and scapular strength to within n5lbs HHD or WNL for proper functional mobility as indicated by patients Functional Deficits. [] Progressing: [] Met: [] Not Met: [] Adjusted  4. Patient will return to working out without pain  activities without increased symptoms or restriction. [] Progressing: [] Met: [] Not Met: [] Adjusted  5. No pain with sleeping(patient specific functional goal)     [] Progressing: [] Met: [] Not Met: [] Adjusted    ASSESSMENT: Patient noting positive response in referred scapular pain sxs following T/S and CT junction mobs/manipulation previous session thus, continued these manual techniques today. Patient now noting increased L anterior shoulder and biceps pain with intermittent N/T referring from medial L elbow to middle finger along ulnar nerve distribution. Patient very tight and restricted in L posterior capsule likely resulting in poor GHJ mechanics causing anterior L shoulder and biceps tendon pain sxs. Addressed posterior capsule mobility restrictions with manual techniques and self stretching. Patient reports improved sxs at end of session and demonstrates improved L shoulder ROM/mobility.  Will assess response at next session to determine dosage of manual at neck and shoulder and potential continued DN if warranted. Return to Play: (if applicable)   []  Stage 1: Intro to Strength   []  Stage 2: Dynamic Strength and Intro to Plyometrics   []  Stage 3: Advanced Plyometrics and Intro to Throwing   []  Stage 4: Sport specific Training/Return to Sport     []  Ready to Return to Play, [employer]   []  Not Ready for Return to Sports   Comments:      Treatment/Activity Tolerance:  [x] Patient tolerated treatment well [] Patient limited by fatique  [] Patient limited by pain  [] Patient limited by other medical complications  [] Other:     Overall Progression Towards Functional goals/ Treatment Progress Update:  [x] Patient is progressing as expected towards functional goals listed. [] Progression is slowed due to complexities/Impairments listed. [] Progression has been slowed due to co-morbidities. [] Plan just implemented, too soon to assess goals progression <30days   [] Goals require adjustment due to lack of progress  [] Patient is not progressing as expected and requires additional follow up with physician  [] Other    Prognosis for POC: [x] Good [] Fair  [] Poor    Patient requires continued skilled intervention: [x] Yes  [] No      PLAN:  Manual, DN, postural progression  [x] Continue per plan of care [] Alter current plan (see comments)  [] Plan of care initiated [] Hold pending MD visit [] Discharge    Electronically signed by: Moises Farias, PT, DPT, MS, SCS     Note: If patient does not return for scheduled/recommended follow up visits, this note will serve as a discharge from care along with the most recent update on progress.

## 2021-12-23 ENCOUNTER — HOSPITAL ENCOUNTER (OUTPATIENT)
Dept: PHYSICAL THERAPY | Age: 46
Setting detail: THERAPIES SERIES
Discharge: HOME | End: 2021-12-23
Payer: COMMERCIAL

## 2021-12-23 PROCEDURE — 97110 THERAPEUTIC EXERCISES: CPT

## 2021-12-23 PROCEDURE — 97140 MANUAL THERAPY 1/> REGIONS: CPT

## 2021-12-23 NOTE — FLOWSHEET NOTE
[address] [place], [place] [address]  Phone: [phone] Fax: [phone]    Physical Therapy Treatment Note/ Progress Report:     Date:  2021    Patient Name:  Leidy Díaz    :  1975  MRN: [id number]  Restrictions/Precautions:    Medical/Treatment Diagnosis Information:  · Diagnosis: L shoulder arthroscopy, SAD, DCE, shoulder pain M25. 512  · Treatment Diagnosis: L shoulder arthroscopy, SAD, DCE, shoulder pain M25. 919  Insurance/Certification information:     Physician Information:  Referring Practitioner: Dr Elaine Hummel of care signed (Y/N):     Date of Patient follow up with Physician:      Progress Report: []  Yes  [x]  No     Date Range for reporting period:  Beginnin21  Ending:       Progress report due (10 Rx/or 30 days whichever is less):       Recertification due (POC duration/ or 90 days whichever is less):       Visit # Insurance Allowable Auth Needed   7 40 []Yes    []No     Pain level:  2-310     SUBJECTIVE:  Anterior L shoulder pain and nagging achiness in L distal bicep as been much better since yesterday (last session). N/T in L forearm and hand has been significantly less frequent. Patient has been consistently performing stretches throughout her work day outside of PT to try to keep pain levels down and to try to improve L shoulder mobility. Reports that she was actually able to sleep comfortably for more than 20 minutes last night. Patient saw Dr. Viridiana Pedro today with her daughter, who recently had her ACL reconstructed. Dr. Viridiana Pedro prescribed patient an oral steroid to help with pain and to help patient push her mobility more. OBJECTIVE:    Observation: restricted L cervical rotation to 60 deg w/ referring shoulder blade pain and worsening of middle finger numbness; limited mobility L CTJ/T1/T2   Test measurements:      RESTRICTIONS/PRECAUTIONS: L shoulder arthroscopy 21.  L lower cervical radic    Exercises/Interventions:   Therapeutic Ex (50863) Sets/sec Reps CUES/Notes   UBE  4 min 1/2 retro   Seated SB flexion stretch 10 10    Cane AAROM flexion - supine  Seated ER 10s 10 Each position   Cross body adduction stretch 10s 10 Low elbow height   IR towel stretch behind back 10s 10    3 way Isomet 5 sec 12 ER/IR   T- band Row/pinch      T- band lower pinch      T- band ER/IR  1 12 red   Supine SA punch      SL ER/SL punch      Prone Rows/ext      Prone HAB/Prone Flex      Seat Table Slides - abduction 10s 10    Wall slides - flexion 10s 10    Doorway ER stretch - 60 deg elevation 10s 10    Seated HH Depression      No Money      Scap Wall Lat touches/wall walks            Standing flex/scap      Standing Punch      Lawnmower                              Manual Intervention  (20683)      L GH inferior and posterior glides  5 min Gr. II-III   L posterior capsule stretch - prone, towel roll under anterior shoulder  5 min Gr. II-III   L shoulder PROM - flex, abd, ER  5 min    L scapular pin and stretch - OH flex  5 min    CT junction, T/S mobs/manips  5 min Gr. III-IV, Gr. V                     NMR Re-education (87093)      T-spine Ext      GH depress/compress      Scap/GH NMR      Body blade      Wall ball roll      Wall Ball bounce      Ball drops      Dionne Scap Bio      Floor Snow angels-sliders            Therapeutic Activity (18723)      UE throwing porgression      Dynamic UE stability      Earthquake Bar      Bodyblade                Therapeutic Exercise and NMR EXR  [x] (39792) Provided verbal/tactile cueing for activities related to strengthening, flexibility, endurance, ROM  for improvements in scapular, scapulothoracic and UE control with self care, reaching, carrying, lifting, house/yardwork, driving/computer work.    [] (08393) Provided verbal/tactile cueing for activities related to improving balance, coordination, kinesthetic sense, posture, motor skill, proprioception  to assist with scapular, scapulothoracic and UE control with self care, reaching, carrying, lifting, house/yardwork, driving/computer work. Therapeutic Activities:    [] (36191 or 63665) Provided verbal/tactile cueing for activities related to improving balance, coordination, kinesthetic sense, posture, motor skill, proprioception and motor activation to allow for proper function of scapular, scapulothoracic and UE control with self care, carrying, lifting, driving/computer work.      Home Exercise Program:    [x] (95023) Reviewed/Progressed HEP activities related to strengthening, flexibility, endurance, ROM of scapular, scapulothoracic and UE control with self care, reaching, carrying, lifting, house/yardwork, driving/computer work  [] (46128) Reviewed/Progressed HEP activities related to improving balance, coordination, kinesthetic sense, posture, motor skill, proprioception of scapular, scapulothoracic and UE control with self care, reaching, carrying, lifting, house/yardwork, driving/computer work      Manual Treatments:  PROM / STM / Oscillations-Mobs:  G-I, II, III, IV (PA's, Inf., Post.)  [x] (91982) Provided manual therapy to mobilize soft tissue/joints of cervical/CT, scapular GHJ and UE for the purpose of modulating pain, promoting relaxation,  increasing ROM, reducing/eliminating soft tissue swelling/inflammation/restriction, improving soft tissue extensibility and allowing for proper ROM for normal function with self care, reaching, carrying, lifting, house/yardwork, driving/computer work      Modalities:      Charges:  Timed Code Treatment Minutes: 50   Total Treatment Minutes: 50       [] EVAL (LOW) 34854 (typically 20 minutes face-to-face)  [] EVAL (MOD) 75857 (typically 30 minutes face-to-face)  [] EVAL (HIGH) 06636 (typically 45 minutes face-to-face)  [] RE-EVAL     [x] BP(36088) x 1   [] DRY NEEDLE 1 OR 2 MUSCLES  [] NMR (61208) x     [] DRY NEEDLE 3+ MUSCLES  [x] Manual (69948) x 2      [] TA (38104) x     [] [facility] Traction (16019)  [] ES(attended) (88664)     [] ES (un) (63826):   [] VASO (00388)  [] Other:    GOALS:  Patient stated goal: work pain free  [] Progressing: [] Met: [] Not Met: [] Adjusted    Therapist goals for Patient:   Short Term Goals: To be achieved in: 2 weeks  1. Independent in HEP and progression per patient tolerance, in order to prevent re-injury. [] Progressing: [] Met: [] Not Met: [] Adjusted  2. Patient will have a decrease in pain to facilitate improvement in movement, function, and ADLs as indicated by Functional Deficits. [] Progressing: [] Met: [] Not Met: [] Adjusted    Long Term Goals: To be achieved in: 10 weeks  1. Disability index score of 10% or less for the Quick DASH to assist with reaching prior level of function. [] Progressing: [] Met: [] Not Met: [] Adjusted  2. Patient will demonstrate increased AROM to [facility] to allow for proper joint functioning as indicated by Functional Deficits. [] Progressing: [] Met: [] Not Met: [] Adjusted  3. Patient will demonstrate an increase in NM recruitment/activation and overall GH and scapular strength to within n5lbs HHD or WNL for proper functional mobility as indicated by patients Functional Deficits. [] Progressing: [] Met: [] Not Met: [] Adjusted  4. Patient will return to working out without pain  activities without increased symptoms or restriction. [] Progressing: [] Met: [] Not Met: [] Adjusted  5. No pain with sleeping(patient specific functional goal)     [] Progressing: [] Met: [] Not Met: [] Adjusted    ASSESSMENT: Positive response to previous session. Reports decreased L anterior shoulder and biceps pain and significantly less frequent N/T in L forearm and hand. Reports that she was actually able to sleep comfortably for more than 20 minutes last night. Continued focus on addressing posterior capsule mobility restrictions as well as progression of global L shoulder ROM/mobility with manual techniques and self stretching.

## 2021-12-27 ENCOUNTER — HOSPITAL ENCOUNTER (OUTPATIENT)
Dept: PHYSICAL THERAPY | Age: 46
Setting detail: THERAPIES SERIES
Discharge: HOME | End: 2021-12-27
Payer: COMMERCIAL

## 2021-12-27 PROCEDURE — 97140 MANUAL THERAPY 1/> REGIONS: CPT

## 2021-12-27 PROCEDURE — 97110 THERAPEUTIC EXERCISES: CPT

## 2021-12-27 NOTE — FLOWSHEET NOTE
Prone Rows/ext      Prone HAB/Prone Flex      Seat Table Slides - abduction 10s 10    Wall slides - flexion 10s 10    Doorway ER stretch - 60 deg elevation 10s 10    Seated HH Depression      No Money      Scap Wall Lat touches/wall walks            Standing flex/scap      Standing Punch      Lawnmower                              Manual Intervention  (45785)      L GH inferior and posterior glides  5 min Gr. II-III   L posterior capsule stretch - prone, towel roll under anterior shoulder  5 min Gr. II-III   L shoulder PROM - flex, abd, ER  5 min    L scapular pin and stretch - OH flex  5 min    CT junction, T/S mobs/manips  5 min Gr. III-IV, Gr. V                     NMR Re-education (60959)      T-spine Ext      GH depress/compress      Scap/GH NMR      Body blade      Wall ball roll      Wall Ball bounce      Ball drops      Dionne Scap Bio      Floor Snow angels-sliders            Therapeutic Activity (97966)      UE throwing porgression      Dynamic UE stability      Earthquake Bar      Bodyblade                Therapeutic Exercise and NMR EXR  [x] (85848) Provided verbal/tactile cueing for activities related to strengthening, flexibility, endurance, ROM  for improvements in scapular, scapulothoracic and UE control with self care, reaching, carrying, lifting, house/yardwork, driving/computer work.    [] (50318) Provided verbal/tactile cueing for activities related to improving balance, coordination, kinesthetic sense, posture, motor skill, proprioception  to assist with  scapular, scapulothoracic and UE control with self care, reaching, carrying, lifting, house/yardwork, driving/computer work.     Therapeutic Activities:    [] (85345 or 32451) Provided verbal/tactile cueing for activities related to improving balance, coordination, kinesthetic sense, posture, motor skill, proprioception and motor activation to allow for proper function of scapular, scapulothoracic and UE control with self care, carrying, lifting, driving/computer work. Home Exercise Program:    [x] (91294) Reviewed/Progressed HEP activities related to strengthening, flexibility, endurance, ROM of scapular, scapulothoracic and UE control with self care, reaching, carrying, lifting, house/yardwork, driving/computer work  [] (63018) Reviewed/Progressed HEP activities related to improving balance, coordination, kinesthetic sense, posture, motor skill, proprioception of scapular, scapulothoracic and UE control with self care, reaching, carrying, lifting, house/yardwork, driving/computer work      Manual Treatments:  PROM / STM / Oscillations-Mobs:  G-I, II, III, IV (PA's, Inf., Post.)  [x] (56192) Provided manual therapy to mobilize soft tissue/joints of cervical/CT, scapular GHJ and UE for the purpose of modulating pain, promoting relaxation,  increasing ROM, reducing/eliminating soft tissue swelling/inflammation/restriction, improving soft tissue extensibility and allowing for proper ROM for normal function with self care, reaching, carrying, lifting, house/yardwork, driving/computer work      Modalities:      Charges:  Timed Code Treatment Minutes: 50   Total Treatment Minutes: 50       [] EVAL (LOW) 62989 (typically 20 minutes face-to-face)  [] EVAL (MOD) 16614 (typically 30 minutes face-to-face)  [] EVAL (HIGH) 46228 (typically 45 minutes face-to-face)  [] RE-EVAL     [x] LK(07290) x 1   [] DRY NEEDLE 1 OR 2 MUSCLES  [] NMR (53764) x     [] DRY NEEDLE 3+ MUSCLES  [x] Manual (57465) x 2      [] TA (52728) x     [] [employer] Traction (87063)  [] ES(attended) (37532)     [] ES (un) (42570):   [] VASO (62201)  [] Other:    GOALS:  Patient stated goal: work pain free  [] Progressing: [] Met: [] Not Met: [] Adjusted    Therapist goals for Patient:   Short Term Goals: To be achieved in: 2 weeks  1. Independent in HEP and progression per patient tolerance, in order to prevent re-injury. [] Progressing: [] Met: [] Not Met: [] Adjusted  2.  Patient will have a decrease in pain to facilitate improvement in movement, function, and ADLs as indicated by Functional Deficits. [] Progressing: [] Met: [] Not Met: [] Adjusted    Long Term Goals: To be achieved in: 10 weeks  1. Disability index score of 10% or less for the Quick DASH to assist with reaching prior level of function. [] Progressing: [] Met: [] Not Met: [] Adjusted  2. Patient will demonstrate [facility] to allow for proper joint functioning as indicated by Functional Deficits. [] Progressing: [] Met: [] Not Met: [] Adjusted  3. Patient will demonstrate an increase in NM recruitment/activation and overall GH and scapular strength to within n5lbs HHD or WNL for proper functional mobility as indicated by patients Functional Deficits. [] Progressing: [] Met: [] Not Met: [] Adjusted  4. Patient will return to working out without pain  activities without increased symptoms or restriction. [] Progressing: [] Met: [] Not Met: [] Adjusted  5. No pain with sleeping(patient specific functional goal)     [] Progressing: [] Met: [] Not Met: [] Adjusted    ASSESSMENT: Patient improving with ROM and function. Continued focus on addressing posterior capsule mobility restrictions as well as progression of global L shoulder ROM/mobility with manual techniques and self stretching. Posterior capsule restrictions likely resulting in poor GHJ mechanics causing anterior L shoulder and biceps tendon pain sxs. Definitely needs strengthening to allow improved tolerance to repetitive work tasks/positions.       Return to Play: (if applicable)    []  Stage 1: Intro to Strength   []  Stage 2: Dynamic Strength and Intro to Plyometrics   []  Stage 3: Advanced Plyometrics and Intro to Throwing   []  Stage 4: Sport specific Training/Return to Sport     []  Ready to Return to Play, [employer]   []  Not Ready for Return to Sports   Comments:      Treatment/Activity Tolerance:  [x] Patient tolerated treatment well [] Patient limited by fatique  [] Patient limited by pain  [] Patient limited by other medical complications  [] Other:     Overall Progression Towards Functional goals/ Treatment Progress Update:  [x] Patient is progressing as expected towards functional goals listed. [] Progression is slowed due to complexities/Impairments listed. [] Progression has been slowed due to co-morbidities. [] Plan just implemented, too soon to assess goals progression <30days   [] Goals require adjustment due to lack of progress  [] Patient is not progressing as expected and requires additional follow up with physician  [] Other    Prognosis for POC: [x] Good [] Fair  [] Poor    Patient requires continued skilled intervention: [x] Yes  [] No      PLAN:  Manual, DN, postural progression  [x] Continue per plan of care [] Alter current plan (see comments)  [] Plan of care initiated [] Hold pending MD visit [] Discharge    Electronically signed by: Allan Schilder, PTA, DPT, MS, SCS     Note: If patient does not return for scheduled/recommended follow up visits, this note will serve as a discharge from care along with the most recent update on progress.

## 2021-12-29 ENCOUNTER — HOSPITAL ENCOUNTER (OUTPATIENT)
Dept: PHYSICAL THERAPY | Age: 46
Setting detail: THERAPIES SERIES
Discharge: HOME | End: 2021-12-29
Payer: COMMERCIAL

## 2021-12-29 PROCEDURE — 97110 THERAPEUTIC EXERCISES: CPT

## 2021-12-29 PROCEDURE — 97140 MANUAL THERAPY 1/> REGIONS: CPT

## 2021-12-29 NOTE — FLOWSHEET NOTE
[address] [place] [address]  Phone: [phone] Fax: [phone]    Physical Therapy Treatment Note/ Progress Report:     Date:  2021    Patient Name:  Carmen Bustillo    :  1975  MRN: [id number]  Restrictions/Precautions:    Medical/Treatment Diagnosis Information:  · Diagnosis: L shoulder arthroscopy, SAD, DCE, shoulder pain M25. 512  · Treatment Diagnosis: L shoulder arthroscopy, SAD, DCE, shoulder pain M25. 231  Insurance/Certification information:     Physician Information:  Referring Practitioner: Dr Rk Ceja of care signed (Y/N):     Date of Patient follow up with Physician:      Progress Report: []  Yes  [x]  No     Date Range for reporting period:  Beginnin21  Ending:       Progress report due (10 Rx/or 30 days whichever is less):       Recertification due (POC duration/ or 90 days whichever is less):       Visit # Insurance Allowable Auth Needed   9 40 []Yes    []No     Pain level:  2-3/10     SUBJECTIVE:  Patient states that shoulder was feeling a little better until she had a bad migraine yesterday, went to bed early and slept a lot more than usual, and has no idea how she slept on her shoulder but the stiffness/soreness has increased again. 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 OBJECTIVE:    Observation: restricted L cervical rotation to 60 deg w/ referring shoulder blade pain and worsening of middle finger numbness; limited mobility L CTJ/T1/T2   Test measurements:      RESTRICTIONS/PRECAUTIONS: L shoulder arthroscopy 21.  L lower cervical radic    Exercises/Interventions:   Therapeutic Ex (91089) Sets/sec Reps CUES/Notes   UBE  4 min 1/2 retro   Seated SB flexion stretch 10 10    Cane AAROM flexion - supine  Seated ER 10s 10 Each position   Cross body adduction stretch 10s 10 Low elbow height   IR towel stretch behind back 10s 10    3 way Isomet 5 sec 12 ER/IR   T- band Row/pinch      T- band lower pinch      T- band ER/IR  1 12 red   Supine SA punch      SL ER/SL punch      Prone Rows/ext      Prone HAB/Prone Flex      Seat Table Slides - abduction 10s 10    Wall slides - flexion 10s 10    Doorway ER stretch - 60 deg elevation 10s 10    Seated HH Depression      No Money      Scap Wall Lat touches/wall walks            Standing flex/scap      Standing Punch      Lawnmower                              Manual Intervention  (82835)      L GH inferior and posterior glides  5 min Gr. II-III   L posterior capsule stretch - prone, towel roll under anterior shoulder  5 min Gr. II-III   L shoulder PROM - flex, abd, ER  5 min    L scapular pin and stretch - OH flex  5 min    CT junction, T/S mobs/manips  5 min Gr. III-IV, Gr. V                     NMR Re-education (01937)      T-spine Ext      GH depress/compress      Scap/GH NMR      Body blade      Wall ball roll      Wall Ball bounce      Ball drops      Dionne Scap Bio      Floor Snow angels-sliders            Therapeutic Activity (43528)      UE throwing porgression      Dynamic UE stability      Earthquake Bar      Bodyblade                Therapeutic Exercise and NMR EXR  [x] (98886) Provided verbal/tactile cueing for activities related to strengthening, flexibility, endurance, ROM  for improvements in scapular, scapulothoracic and UE control with self care, reaching, carrying, lifting, house/yardwork, driving/computer work.    [] (18236) Provided verbal/tactile cueing for activities related to improving balance, coordination, kinesthetic sense, posture, motor skill, proprioception  to assist with  scapular, scapulothoracic and UE control with self care, reaching, carrying, lifting, house/yardwork, driving/computer work.     Therapeutic Activities:    [] (83967 or 59069) Provided verbal/tactile cueing for activities related to improving balance, coordination, kinesthetic sense, posture, motor skill, proprioception and motor activation to allow for proper function of scapular, scapulothoracic and UE control with self care, carrying, lifting, driving/computer work. Home Exercise Program:    [x] (91723) Reviewed/Progressed HEP activities related to strengthening, flexibility, endurance, ROM of scapular, scapulothoracic and UE control with self care, reaching, carrying, lifting, house/yardwork, driving/computer work  [] (28147) Reviewed/Progressed HEP activities related to improving balance, coordination, kinesthetic sense, posture, motor skill, proprioception of scapular, scapulothoracic and UE control with self care, reaching, carrying, lifting, house/yardwork, driving/computer work      Manual Treatments:  PROM / STM / Oscillations-Mobs:  G-I, II, III, IV (PA's, Inf., Post.)  [x] (15300) Provided manual therapy to mobilize soft tissue/joints of cervical/CT, scapular GHJ and UE for the purpose of modulating pain, promoting relaxation,  increasing ROM, reducing/eliminating soft tissue swelling/inflammation/restriction, improving soft tissue extensibility and allowing for proper ROM for normal function with self care, reaching, carrying, lifting, house/yardwork, driving/computer work      Modalities:      Charges:  Timed Code Treatment Minutes: 50   Total Treatment Minutes: 50       [] EVAL (LOW) 44780 (typically 20 minutes face-to-face)  [] EVAL (MOD) 44963 (typically 30 minutes face-to-face)  [] EVAL (HIGH) 29261 (typically 45 minutes face-to-face)  [] RE-EVAL     [x] SP(75558) x 1   [] DRY NEEDLE 1 OR 2 MUSCLES  [] NMR (56627) x     [] DRY NEEDLE 3+ MUSCLES  [x] Manual (17019) x 2      [] TA (79438) x     [] Mech Traction (25221)  [] ES(attended) (89122)     [] ES (un) (05963):   [] VASO (31078)  [] Other:    GOALS:  Patient stated goal: work pain free  [] Progressing: [] Met: [] Not Met: [] Adjusted    Therapist goals for Patient:   Short Term Goals: To be achieved in: 2 weeks  1. Independent in HEP and progression per patient tolerance, in order to prevent re-injury. [] Progressing: [] Met: [] Not Met: [] Adjusted  2. Patient will have a decrease in pain to facilitate improvement in movement, function, and ADLs as indicated by Functional Deficits. [] Progressing: [] Met: [] Not Met: [] Adjusted    Long Term Goals: To be achieved in: 10 weeks  1. Disability index score of 10% or less for the Quick DASH to assist with reaching prior level of function. [] Progressing: [] Met: [] Not Met: [] Adjusted  2. Patient will demonstrate increased AROM to [facility] to allow for proper joint functioning as indicated by Functional Deficits. [] Progressing: [] Met: [] Not Met: [] Adjusted  3. Patient will demonstrate an increase in NM recruitment/activation and overall GH and scapular strength to within n5lbs HHD or WNL for proper functional mobility as indicated by patients Functional Deficits. [] Progressing: [] Met: [] Not Met: [] Adjusted  4. Patient will return to working out without pain  activities without increased symptoms or restriction. [] Progressing: [] Met: [] Not Met: [] Adjusted  5. No pain with sleeping(patient specific functional goal)     [] Progressing: [] Met: [] Not Met: [] Adjusted    ASSESSMENT: Patient improving with ROM and function. Continued focus on addressing posterior capsule mobility restrictions as well as progression of global L shoulder ROM/mobility with manual techniques and self stretching. Posterior capsule restrictions likely resulting in poor GHJ mechanics causing anterior L shoulder and biceps tendon pain sxs. Definitely needs strengthening to allow improved tolerance to repetitive work tasks/positions.    Return to Play: (if applicable)    []  Stage 1: Intro to Strength   []  Stage 2: Dynamic Strength and Intro to Plyometrics   []  Stage 3: Advanced Plyometrics and Intro to Throwing   []  Stage 4: Sport specific Training/Return to Sport     []  Ready to Return to Play, Meets All Above Stages   []  Not Ready for Return to Sports   Comments:      Treatment/Activity Tolerance:  [x] Patient tolerated treatment well [] Patient limited by fatique  [] Patient limited by pain  [] Patient limited by other medical complications  [] Other:     Overall Progression Towards Functional goals/ Treatment Progress Update:  [x] Patient is progressing as expected towards functional goals listed. [] Progression is slowed due to complexities/Impairments listed. [] Progression has been slowed due to co-morbidities. [] Plan just implemented, too soon to assess goals progression <30days   [] Goals require adjustment due to lack of progress  [] Patient is not progressing as expected and requires additional follow up with physician  [] Other    Prognosis for POC: [x] Good [] Fair  [] Poor    Patient requires continued skilled intervention: [x] Yes  [] No      PLAN:  Manual, DN, postural progression  [x] Continue per plan of care [] Alter current plan (see comments)  [] Plan of care initiated [] Hold pending MD visit [] Discharge    Electronically signed by: Regina Daniels, PT, DPT, MS, SCS     Note: If patient does not return for scheduled/recommended follow up visits, this note will serve as a discharge from care along with the most recent update on progress.

## 2022-01-03 ENCOUNTER — HOSPITAL ENCOUNTER (OUTPATIENT)
Dept: PHYSICAL THERAPY | Age: 47
Setting detail: THERAPIES SERIES
Discharge: HOME | End: 2022-01-03
Payer: COMMERCIAL

## 2022-01-03 PROCEDURE — 97110 THERAPEUTIC EXERCISES: CPT

## 2022-01-03 PROCEDURE — 97032 APPL MODALITY 1+ESTIM EA 15: CPT

## 2022-01-03 PROCEDURE — 20560 NDL INSJ W/O NJX 1 OR 2 MUSC: CPT

## 2022-01-03 PROCEDURE — 20561 NDL INSJ W/O NJX 3+ MUSC: CPT

## 2022-01-03 PROCEDURE — 97140 MANUAL THERAPY 1/> REGIONS: CPT

## 2022-01-03 NOTE — FLOWSHEET NOTE
[address] [place] [address]  Phone: [phone] Fax: [phone]    Physical Therapy Treatment Note/ Progress Report:     Date:  1/3/2022    Patient Name:  Frankie Singer    :  1975  MRN: [id number]  Restrictions/Precautions:    Medical/Treatment Diagnosis Information:  · Diagnosis: L shoulder arthroscopy, SAD, DCE, shoulder pain M25. 512  · Treatment Diagnosis: L shoulder arthroscopy, SAD, DCE, shoulder pain M25. 218  Insurance/Certification information:     Physician Information:  Referring Practitioner: Dr Cuauhtemoc Leal of care signed (Y/N):     Date of Patient follow up with Physician:      Progress Report: []  Yes  [x]  No     Date Range for reporting period:  Beginnin21  Ending:       Progress report due (10 Rx/or 30 days whichever is less):       Recertification due (POC duration/ or 90 days whichever is less):       Visit # Insurance Allowable Auth Needed   10 40 []Yes    []No     Pain level:  2-3/10     SUBJECTIVE:  Definitely tolerating work activities and positions better. Not as sore by the end of her normal work day, but patient is regularly performing stretches throughout the day to keep her shoulder loosened up. Has been using a theragun to help improve consistent tightness and stiffness in certain areas like in her shoulder blade that feels like it is restricting her ability to get her L arm above her head and behind her back. OBJECTIVE:    Observation: restricted L cervical rotation to 60 deg w/ referring shoulder blade pain and worsening of middle finger numbness; limited mobility L CTJ/T1/T2   Test measurements:      RESTRICTIONS/PRECAUTIONS: L shoulder arthroscopy 21.  L lower cervical radic    Exercises/Interventions:   Therapeutic Ex (02630) Sets/sec Reps CUES/Notes   UBE  4 min 1/2 retro   Seated SB flexion stretch 10 10    Cane AAROM flexion - supine  Seated ER 10s 10 Each position Cross body adduction stretch 10s 10 Low elbow height   IR towel stretch behind back 10s 10    3 way Isomet 5 sec 12 ER/IR   T- band Row/pinch      T- band lower pinch      T- band ER/IR  1 12 red   Supine SA punch 2 10    SL ER/SL punch 2 10 Each   Prone Rows/ext      Prone HAB/Prone Flex      Seat Table Slides - abduction 10s 10    Wall slides - flexion/abduction 10s 10    Doorway ER stretch - 60 deg elevation 10s 10    Seated HH Depression      No Money      Scap Wall Lat touches/wall walks            Standing flex/scap      Standing Punch      Lawnmower                              Manual Intervention  (18486)      L GH inferior and posterior glides  5 min Gr. II-III   L posterior capsule stretch - prone, towel roll under anterior shoulder  5 min Gr. II-III   L shoulder PROM - flex, abd, ER  5 min    L scapular pin and stretch - OH flex  5 min    CT junction, T/S mobs/manips  5 min Gr. III-IV, Gr. V                     NMR Re-education (94005)      T-spine Ext      GH depress/compress      Scap/GH NMR      Body blade      Wall ball roll      Wall Ball bounce      Ball drops      Dionne Scap Bio      Floor Snow angels-sliders            Therapeutic Activity (68840)      UE throwing porgression      Dynamic UE stability      Earthquake Bar      Bodyblade                Therapeutic Exercise and NMR EXR  [x] (83621) Provided verbal/tactile cueing for activities related to strengthening, flexibility, endurance, ROM  for improvements in scapular, scapulothoracic and UE control with self care, reaching, carrying, lifting, house/yardwork, driving/computer work.    [] (76954) Provided verbal/tactile cueing for activities related to improving balance, coordination, kinesthetic sense, posture, motor skill, proprioception  to assist with  scapular, scapulothoracic and UE control with self care, reaching, carrying, lifting, house/yardwork, driving/computer work.     Therapeutic Activities:    [] (54680 or 36797) Provided verbal/tactile cueing for activities related to improving balance, coordination, kinesthetic sense, posture, motor skill, proprioception and motor activation to allow for proper function of scapular, scapulothoracic and UE control with self care, carrying, lifting, driving/computer work. Home Exercise Program:    [x] (61223) Reviewed/Progressed HEP activities related to strengthening, flexibility, endurance, ROM of scapular, scapulothoracic and UE control with self care, reaching, carrying, lifting, house/yardwork, driving/computer work  [] (44605) Reviewed/Progressed HEP activities related to improving balance, coordination, kinesthetic sense, posture, motor skill, proprioception of scapular, scapulothoracic and UE control with self care, reaching, carrying, lifting, house/yardwork, driving/computer work      Manual Treatments:  PROM / STM / Oscillations-Mobs:  G-I, II, III, IV (PA's, Inf., Post.)  [x] (31925) Provided manual therapy to mobilize soft tissue/joints of cervical/CT, scapular GHJ and UE for the purpose of modulating pain, promoting relaxation,  increasing ROM, reducing/eliminating soft tissue swelling/inflammation/restriction, improving soft tissue extensibility and allowing for proper ROM for normal function with self care, reaching, carrying, lifting, house/yardwork, driving/computer work    Spoke with   regarding the use of Dry Needling     Dry needling manual therapy: consisted on the placement of 6 needles in the following muscles:  UT, infraspinatus, casimiro scap. A 50-60 mm needle was inserted, piston, rotated, and coned to produce intramuscular mobilization. These techniques were used to restore functional range of motion, reduce muscle spasm and induce healing in the corresponding musculature. (05843)  Clean Technique was utilized today while applying Dry needling treatment. The treatment sites where cleaned with 70% solution of  isopropyl alcohol .   The PT washed their hands and utilized treatment gloves along with hand  prior to inserting the needles. All needles where removed and discarded in the appropriate sharps container. MD has given verbal and/or written approval for this treatment. Attended low frequency (1-20Hz) electrical stimulation was utilized in conjunction with Dry Needling:  the Estim was manipulated between all above needles for a period of 10 min. at 5 volts. The low frequency electrical stimulation was used to help reduce muscle spasm and help to interrupt /[place] the pain cycle. (41774)   Modalities:      Charges:  Timed Code Treatment Minutes: 50   Total Treatment Minutes: 60       [] EVAL (LOW) 91087 (typically 20 minutes face-to-face)  [] EVAL (MOD) 54013 (typically 30 minutes face-to-face)  [] EVAL (HIGH) 31256 (typically 45 minutes face-to-face)  [] RE-EVAL     [x] JJ(96340) x 1   [] DRY NEEDLE 1 OR 2 MUSCLES  [] NMR (69898) x     [x] DRY NEEDLE 3+ MUSCLES  [x] Manual (92038) x 2      [] TA (27548) x     [] Mech Traction (99501)  [x] ES(attended) (98739)     [] ES (un) (96765):   [] VASO (81789)  [] Other:     GOALS:  Patient stated goal: work pain free  [] Progressing: [] Met: [] Not Met: [] Adjusted    Therapist goals for Patient:   Short Term Goals: To be achieved in: 2 weeks  1. Independent in HEP and progression per patient tolerance, in order to prevent re-injury. [] Progressing: [] Met: [] Not Met: [] Adjusted  2. Patient will have a decrease in pain to facilitate improvement in movement, function, and ADLs as indicated by Functional Deficits. [] Progressing: [] Met: [] Not Met: [] Adjusted    Long Term Goals: To be achieved in: 10 weeks  1. Disability index score of 10% or less for the Quick DASH to assist with reaching prior level of function. [] Progressing: [] Met: [] Not Met: [] Adjusted  2. Patient will demonstrate increased AROM to [facility] to allow for proper joint functioning as indicated by Functional Deficits.    [] Progressing: [] Met: [] Not Met: [] Adjusted  3. Patient will demonstrate an increase in NM recruitment/activation and overall GH and scapular strength to within n5lbs HHD or WNL for proper functional mobility as indicated by patients Functional Deficits. [] Progressing: [] Met: [] Not Met: [] Adjusted  4. Patient will return to working out without pain  activities without increased symptoms or restriction. [] Progressing: [] Met: [] Not Met: [] Adjusted  5. No pain with sleeping(patient specific functional goal)     [] Progressing: [] Met: [] Not Met: [] Adjusted    ASSESSMENT: Patient improving with ROM and function. Continued focus on addressing posterior capsule mobility restrictions as well as progression of global L shoulder ROM/mobility with manual techniques and self stretching. Posterior capsule restrictions likely resulting in poor GHJ mechanics causing anterior L shoulder and biceps tendon pain sxs. Was able to initiate some light scapular and RTC strengthening today with initiation of supine SA punch, sidelying SA punch, and sidelying ER to neutral. Definitely needs continued strength progression to allow improved tolerance to repetitive work tasks/positions and return to previous high level resistance training tasks for health and fitness purposes.      Return to Play: (if applicable)    []  Stage 1: Intro to Strength   []  Stage 2: Dynamic Strength and Intro to Plyometrics   []  Stage 3: Advanced Plyometrics and Intro to Throwing   []  Stage 4: Sport specific Training/Return to Sport     []  Ready to Return to Play, [employer]   []  Not Ready for Return to Sports   Comments:      Treatment/Activity Tolerance:  [x] Patient tolerated treatment well [] Patient limited by fatique  [] Patient limited by pain  [] Patient limited by other medical complications  [] Other:     Overall Progression Towards Functional goals/ Treatment Progress Update:  [x] Patient is progressing as expected towards functional goals listed. [] Progression is slowed due to complexities/Impairments listed. [] Progression has been slowed due to co-morbidities. [] Plan just implemented, too soon to assess goals progression <30days   [] Goals require adjustment due to lack of progress  [] Patient is not progressing as expected and requires additional follow up with physician  [] Other    Prognosis for POC: [x] Good [] Fair  [] Poor    Patient requires continued skilled intervention: [x] Yes  [] No      PLAN:  Manual, DN, postural progression  [x] Continue per plan of care [] Alter current plan (see comments)  [] Plan of care initiated [] Hold pending MD visit [] Discharge    Electronically signed by: Marge Garcia, PT, DPT, MS, SCS     Note: If patient does not return for scheduled/recommended follow up visits, this note will serve as a discharge from care along with the most recent update on progress.

## 2022-01-07 ENCOUNTER — HOSPITAL ENCOUNTER (OUTPATIENT)
Dept: PHYSICAL THERAPY | Age: 47
Setting detail: THERAPIES SERIES
Discharge: HOME | End: 2022-01-07
Payer: COMMERCIAL

## 2022-01-07 PROCEDURE — 97140 MANUAL THERAPY 1/> REGIONS: CPT

## 2022-01-07 PROCEDURE — 97110 THERAPEUTIC EXERCISES: CPT

## 2022-01-07 NOTE — PLAN OF CARE
[place] [address]  Phone: [phone] Fax: [phone]          Physical Therapy Re-Certification Plan of Care/MD UPDATE      Dear  Kat Roy,    We had the pleasure of treating the following patient for physical therapy services at [address] [place] [address]. A summary of our findings can be found in the updated assessment below. This includes our plan of care. If you have any questions or concerns regarding these findings, please do not hesitate to contact me at the office phone number checked above. Thank you for the referral.     Physician Signature:________________________________Date:__________________  By signing above (or electronic signature), therapists plan is approved by physician    Date Range Of Visits: 21 - 22  Total Visits to Date: 6  Overall Response to Treatment:   []Patient is responding well to treatment and improvement is noted with regards  to goals   []Patient should continue to improve in reasonable time if they continue HEP   []Patient has plateaued and is no longer responding to skilled PT intervention    []Patient is getting worse and would benefit from return to referring MD   []Patient unable to adhere to initial POC   [x]Other: Patient is making slow progress, likely as the result of a slip and fall she sustained 2 weeks after surgery. She has made slight gains in ROM but still far from her uninvolved side in all planes. Significant GH joint hypomobility present as well as weakness of the rotator cuff and periscapular musculature.                      Physical Therapy Treatment Note/ Progress Report:     Date:  2022    Patient Name:  Deisi Puente    :  1975  MRN: [id number]  Restrictions/Precautions:    Medical/Treatment Diagnosis Information:  · Diagnosis: L shoulder arthroscopy, SAD, DCE, shoulder pain M25. 512  · Treatment Diagnosis: L shoulder arthroscopy, SAD, DCE, shoulder pain M25. 908  Insurance/Certification information:     Physician Information:  Referring Practitioner: Dr Yarbrough Case of care signed (Y/N):     Date of Patient follow up with Physician:      Progress Report: [x]  Yes  []  No     Date Range for reporting period:  Beginnin21  Endin22     Progress report due (10 Rx/or 30 days whichever is less):    27    Recertification due (POC duration/ or 90 days whichever is less):   22    Visit # Insurance Allowable Auth Needed   11 40 []Yes    []No     Pain level:  2-3/10     SUBJECTIVE:  Needling helped at last session and she feels that the back of her shoulder/scapular area is much looser now. However, she continues to feel extremely tight in anterior shoulder and in axilla, along lateral border of scapula. Still very limited in her AROM due to weakness and pain. Anterior shoulder very sore today because of the positions she was having to assume/maintain at work today. OBJECTIVE:    Observation:    Test measurements:         ROM Left Right   Shoulder Flex 115 (140) 170   Shoulder Abd 85 (100) 170   Shoulder ER @ 90 (40) 90   Shoulder IR L4 (25) T5                   Strength  Left Right   Shoulder Flex 0.0 lb 8.1 lb   Shoulder Scap 0.0 lb 8.0 lb   Shoulder ER 9.7 lb 15.7 lb   Shoulder IR 14.6 lb 20.0 lb         RESTRICTIONS/PRECAUTIONS: L shoulder arthroscopy 21.  L lower cervical radic    Exercises/Interventions:   Therapeutic Ex (78043)  Min: 20 Sets/sec Reps CUES/Notes   UBE  4 min 1/2 retro   Cane AAROM flexion - supine 10 10 3#   Supine wand ER stretch @ 90 10 10    Cross body adduction stretch 10 10 Low elbow height   Sleeper stretch 30 3    IR towel stretch behind back 10s 10    3 way Isomet 5 sec 12 ER/IR   T- band Row/pinch      T- band lower pinch      T- band ER/IR  1 12 red   Supine SA punch 2 10    SL ER/SL punch 2 10 Each   Prone Rows/ext      Prone HAB/Prone Flex      Seat Table Slides - abduction 10s 10    Wall slides - flexion/abduction 10s 10    Doorway ER stretch - 60 deg elevation 10s 10    Seated HH Depression      No Money      Scap Wall Lat touches/wall walks            Standing flex/scap      Standing Punch      Lawnmower                              Manual Intervention  (08740)  Min: 25      L GH inferior and posterior glides  5 min Gr. II-III   L posterior capsule stretch - prone, towel roll under anterior shoulder  5 min Gr. II-III   L shoulder PROM - flex, abd, ER  5 min    L scapular pin and stretch - OH flex  5 min    CT junction, T/S mobs/manips  5 min Gr. III-IV, Gr. V                     NMR Re-education (12088)      T-spine Ext      GH depress/compress      Scap/GH NMR      Body blade      Wall ball roll      Wall Ball bounce      Ball drops      Dionne Scap Bio      Floor Snow angels-sliders            Therapeutic Activity (96284)      UE throwing porgression      Dynamic UE stability      Earthquake Bar      Bodyblade                Therapeutic Exercise and NMR EXR  [x] (29954) Provided verbal/tactile cueing for activities related to strengthening, flexibility, endurance, ROM  for improvements in scapular, scapulothoracic and UE control with self care, reaching, carrying, lifting, house/yardwork, driving/computer work.    [] (45186) Provided verbal/tactile cueing for activities related to improving balance, coordination, kinesthetic sense, posture, motor skill, proprioception  to assist with  scapular, scapulothoracic and UE control with self care, reaching, carrying, lifting, house/yardwork, driving/computer work. Therapeutic Activities:    [] (35333 or 91431) Provided verbal/tactile cueing for activities related to improving balance, coordination, kinesthetic sense, posture, motor skill, proprioception and motor activation to allow for proper function of scapular, scapulothoracic and UE control with self care, carrying, lifting, driving/computer work.      Home Exercise Program:    [x] (63119) Reviewed/Progressed HEP activities related to strengthening, flexibility, endurance, ROM of scapular, scapulothoracic and UE control with self care, reaching, carrying, lifting, house/yardwork, driving/computer work  [] (69894) Reviewed/Progressed HEP activities related to improving balance, coordination, kinesthetic sense, posture, motor skill, proprioception of scapular, scapulothoracic and UE control with self care, reaching, carrying, lifting, house/yardwork, driving/computer work      Manual Treatments:  PROM / STM / Oscillations-Mobs:  G-I, II, III, IV (PA's, Inf., Post.)  [x] (35001) Provided manual therapy to mobilize soft tissue/joints of cervical/CT, scapular GHJ and UE for the purpose of modulating pain, promoting relaxation,  increasing ROM, reducing/eliminating soft tissue swelling/inflammation/restriction, improving soft tissue extensibility and allowing for proper ROM for normal function with self care, reaching, carrying, lifting, house/yardwork, driving/computer work    S  Modalities:      Charges:  Timed Code Treatment Minutes: 45   Total Treatment Minutes: 45       [] EVAL (LOW) 49353 (typically 20 minutes face-to-face)  [] EVAL (MOD) 17038 (typically 30 minutes face-to-face)  [] EVAL (HIGH) 03874 (typically 45 minutes face-to-face)  [] RE-EVAL     [x] BP(05692) x 1   [] DRY NEEDLE 1 OR 2 MUSCLES  [] NMR (20024) x     [] DRY NEEDLE 3+ MUSCLES  [x] Manual (34246) x 2      [] TA (20421) x     [] Mech Traction (53665)  [] ES(attended) (43128)     [] ES (un) (24245):   [] VASO (54053)  [] Other:     GOALS:  Patient stated goal: work pain free  [x] Progressing: [] Met: [] Not Met: [] Adjusted    Therapist goals for Patient:   Short Term Goals: To be achieved in: 2 weeks  1. Independent in HEP and progression per patient tolerance, in order to prevent re-injury. [] Progressing: [x] Met: [] Not Met: [] Adjusted  2.  Patient will have a decrease in pain to facilitate improvement in movement, function, and ADLs as indicated by Functional Deficits. [x] Progressing: [] Met: [] Not Met: [] Adjusted    Long Term Goals: To be achieved in: 10 weeks  1. Disability index score of 10% or less for the Quick DASH to assist with reaching prior level of function. [x] Progressing: [] Met: [] Not Met: [] Adjusted  2. Patient will demonstrate increased AROM to [facility] to allow for proper joint functioning as indicated by Functional Deficits. [x] Progressing: [] Met: [] Not Met: [] Adjusted  3. Patient will demonstrate an increase in NM recruitment/activation and overall GH and scapular strength to within n5lbs HHD or WNL for proper functional mobility as indicated by patients Functional Deficits. [x] Progressing: [] Met: [] Not Met: [] Adjusted  4. Patient will return to working out without pain  activities without increased symptoms or restriction. [x] Progressing: [] Met: [] Not Met: [] Adjusted  5. No pain with sleeping(patient specific functional goal)     [x] Progressing: [] Met: [] Not Met: [] Adjusted    ASSESSMENT: Patient has attended 11 physical therapy visits from 11/30/21 through 1/7/22 for treatment s/p left shoulder arthroscopy. Patient is making slow progress, likely as the result of a slip and fall she sustained 2 weeks after surgery and shoulder showing appearance of potential adhesive capsulitis. She has made slight gains in ROM but still far from her uninvolved side in all planes. Significant GH joint hypomobility present as well as weakness of the rotator cuff and periscapular musculature. Patient will benefit from continued physical therapy to address these impairments to allow improved tolerance to repetitive work tasks/positions and return to previous high level resistance training tasks for health and fitness purposes.      Return to Play: (if applicable)    []  Stage 1: Intro to Strength   []  Stage 2: Dynamic Strength and Intro to Plyometrics   []  Stage 3: Advanced Plyometrics and Intro to Throwing   []  Stage 4: Sport specific Training/Return to Sport     []  Ready to Return to Play, [employer]   []  Not Ready for Return to Sports   Comments:      Treatment/Activity Tolerance:  [x] Patient tolerated treatment well [] Patient limited by fatique  [] Patient limited by pain  [] Patient limited by other medical complications  [] Other:     Overall Progression Towards Functional goals/ Treatment Progress Update:  [] Patient is progressing as expected towards functional goals listed. [x] Progression is slowed due to complexities/Impairments listed. - presenting like potential frozen shoulder resulting from fall after surgery  [] Progression has been slowed due to co-morbidities. [] Plan just implemented, too soon to assess goals progression <30days   [] Goals require adjustment due to lack of progress  [] Patient is not progressing as expected and requires additional follow up with physician  [] Other    Prognosis for POC: [x] Good [] Fair  [] Poor    Patient requires continued skilled intervention: [x] Yes  [] No      PLAN:  Continue PT 2x/week for 4-6 weeks to include Manual, DN, postural progression, rotator cuff and periscapular strengthening. [x] Continue per plan of care [] Alter current plan (see comments)  [] Plan of care initiated [] Hold pending MD visit [] Discharge    Electronically signed by: Sindhu Urban PT, DPT    Note: If patient does not return for scheduled/recommended follow up visits, this note will serve as a discharge from care along with the most recent update on progress.

## 2022-01-11 ENCOUNTER — APPOINTMENT (OUTPATIENT)
Dept: PHYSICAL THERAPY | Age: 47
End: 2022-01-11
Payer: COMMERCIAL

## 2022-01-12 ENCOUNTER — HOSPITAL ENCOUNTER (OUTPATIENT)
Dept: PHYSICAL THERAPY | Age: 47
Setting detail: THERAPIES SERIES
Discharge: HOME | End: 2022-01-12
Payer: COMMERCIAL

## 2022-01-12 PROCEDURE — 20560 NDL INSJ W/O NJX 1 OR 2 MUSC: CPT

## 2022-01-12 PROCEDURE — 97140 MANUAL THERAPY 1/> REGIONS: CPT

## 2022-01-12 PROCEDURE — 97032 APPL MODALITY 1+ESTIM EA 15: CPT

## 2022-01-12 NOTE — FLOWSHEET NOTE
[address] [place] [address] [place] [address]  Phone: [phone] Fax: [phone]      Physical Therapy Treatment Note/ Progress Report:     Date:  2022    Patient Name:  Brown Corona    :  1975  MRN: [id number]  Restrictions/Precautions:    Medical/Treatment Diagnosis Information:  · Diagnosis: L shoulder arthroscopy, SAD, DCE, shoulder pain M25. 512  · Treatment Diagnosis: L shoulder arthroscopy, SAD, DCE, shoulder pain M25. 107  Insurance/Certification information:     Physician Information:  Referring Practitioner: Dr Aleman Favors of care signed (Y/N):     Date of Patient follow up with Physician:      Progress Report: [x]  Yes  [x]  No     Date Range for reporting period:  Beginnin21  Endin22     Progress report due (10 Rx/or 30 days whichever is less):    3/7/03    Recertification due (POC duration/ or 90 days whichever is less):   22    Visit # Insurance Allowable Auth Needed   12 40 []Yes    []No     Pain level:  2-3/10     SUBJECTIVE:  States she has been pretty sore through front and back/side of shoulder blade due to working extra to cover [employer] absences. Would like to try DN lateral border today. OBJECTIVE:    Observation:    Test measurements:         ROM Left Right   Shoulder Flex 115 (140) 170   Shoulder Abd 85 (100) 170   Shoulder ER @ 90 (40) 90   Shoulder IR L4 (25) T5                   Strength  Left Right   Shoulder Flex 0.0 lb 8.1 lb   Shoulder Scap 0.0 lb 8.0 lb   Shoulder ER 9.7 lb 15.7 lb   Shoulder IR 14.6 lb 20.0 lb         RESTRICTIONS/PRECAUTIONS: L shoulder arthroscopy 21.  L lower cervical radic    Exercises/Interventions:   Therapeutic Ex (92430)  Min: 20 Sets/sec Reps CUES/Notes   UBE  4 min 1/2 retro   Cane AAROM flexion - supine 10 10 3#   Supine wand ER stretch @ 90 10 10    Cross body adduction stretch 10 10 Low elbow height   Sleeper stretch 30 3    IR towel stretch behind back 10s 10    3 way Isomet 5 sec 12 ER/IR   T- band Row/pinch      T- band lower pinch      T- band ER/IR  1 12 red   Supine SA punch 2 10    SL ER/SL punch 2 10 Each   Prone Rows/ext      Prone HAB/Prone Flex      Seat Table Slides - abduction 10s 10    Wall slides - flexion/abduction 10s 10    Doorway ER stretch - 60 deg elevation 10s 10    Seated HH Depression      No Money      Scap Wall Lat touches/wall walks      Half wall lat stretch 30 3    Standing flex/scap      Standing Punch      Lawnmower                              Manual Intervention  (89367)  Min: 25      L GH inferior and posterior glides   Gr. II-III   L posterior capsule stretch - prone, towel roll under anterior shoulder  5 min Gr. II-III   L shoulder PROM - flex, abd, ER  5 min    L scapular pin and stretch - OH flex  5 min    CT junction, T/S mobs/manips  5 min Gr. III-IV, Gr. V         DN + estim 10           NMR Re-education (65145)      T-spine Ext      GH depress/compress      Scap/GH NMR      Body blade      Wall ball roll      Wall Ball bounce      Ball drops      Dionne Scap Bio      Floor Snow angels-sliders            Therapeutic Activity (89388)      UE throwing porgression      Dynamic UE stability      Earthquake Bar      Bodyblade                Therapeutic Exercise and NMR EXR  [x] (01377) Provided verbal/tactile cueing for activities related to strengthening, flexibility, endurance, ROM  for improvements in scapular, scapulothoracic and UE control with self care, reaching, carrying, lifting, house/yardwork, driving/computer work.    [] (43106) Provided verbal/tactile cueing for activities related to improving balance, coordination, kinesthetic sense, posture, motor skill, proprioception  to assist with  scapular, scapulothoracic and UE control with self care, reaching, carrying, lifting, house/yardwork, driving/computer work.     Therapeutic Activities:    [] (85984 or 24573) Provided verbal/tactile cueing for activities related to improving balance, coordination, kinesthetic sense, posture, motor skill, proprioception and motor activation to allow for proper function of scapular, scapulothoracic and UE control with self care, carrying, lifting, driving/computer work. Home Exercise Program:    [x] (32358) Reviewed/Progressed HEP activities related to strengthening, flexibility, endurance, ROM of scapular, scapulothoracic and UE control with self care, reaching, carrying, lifting, house/yardwork, driving/computer work  [] (20174) Reviewed/Progressed HEP activities related to improving balance, coordination, kinesthetic sense, posture, motor skill, proprioception of scapular, scapulothoracic and UE control with self care, reaching, carrying, lifting, house/yardwork, driving/computer work      Manual Treatments:  PROM / STM / Oscillations-Mobs:  G-I, II, III, IV (PA's, Inf., Post.)  [x] (19852) Provided manual therapy to mobilize soft tissue/joints of cervical/CT, scapular GHJ and UE for the purpose of modulating pain, promoting relaxation,  increasing ROM, reducing/eliminating soft tissue swelling/inflammation/restriction, improving soft tissue extensibility and allowing for proper ROM for normal function with self care, reaching, carrying, lifting, house/yardwork, driving/computer work    Spoke with   regarding the use of Dry Needling     Dry needling manual therapy: consisted on the placement of 6 needles in the following muscles:  lateral scap border. A 40mm needle was inserted, piston, rotated, and coned to produce intramuscular mobilization. These techniques were used to restore functional range of motion, reduce muscle spasm and induce healing in the corresponding musculature. (69558)  Clean Technique was utilized today while applying Dry needling treatment. The treatment sites where cleaned with 70% solution of  isopropyl alcohol .   The PT washed their hands and utilized treatment gloves along with hand  prior to inserting the needles. All needles where removed and discarded in the appropriate sharps container. MD has given verbal and/or written approval for this treatment. Attended low frequency (1-20Hz) electrical stimulation was utilized in conjunction with Dry Needling:  the Estim was manipulated between all above needles for a period of 10 min. at 5 volts. The low frequency electrical stimulation was used to help reduce muscle spasm and help to interrupt /[place] the pain cycle. (20124)   Modalities:      Charges:  Timed Code Treatment Minutes: 30   Total Treatment Minutes: 45       [] EVAL (LOW) 98291 (typically 20 minutes face-to-face)  [] EVAL (MOD) 36947 (typically 30 minutes face-to-face)  [] EVAL (HIGH) 34363 (typically 45 minutes face-to-face)  [] RE-EVAL     [] WH(13520) x 1   [x] DRY NEEDLE 1 OR 2 MUSCLES  [] NMR (75668) x     [] DRY NEEDLE 3+ MUSCLES  [x] Manual (63320) x 1      [] TA (58156) x     [] Mech Traction (34605)  [x] ES(attended) (40398)     [] ES (un) (15422):   [] VASO (79696)  [] Other:     GOALS:  Patient stated goal: work pain free  [x] Progressing: [] Met: [] Not Met: [] Adjusted    Therapist goals for Patient:   Short Term Goals: To be achieved in: 2 weeks  1. Independent in HEP and progression per patient tolerance, in order to prevent re-injury. [] Progressing: [x] Met: [] Not Met: [] Adjusted  2. Patient will have a decrease in pain to facilitate improvement in movement, function, and ADLs as indicated by Functional Deficits. [x] Progressing: [] Met: [] Not Met: [] Adjusted    Long Term Goals: To be achieved in: 10 weeks  1. Disability index score of 10% or less for the Quick DASH to assist with reaching prior level of function. [x] Progressing: [] Met: [] Not Met: [] Adjusted  2. Patient will demonstrate increased AROM to [facility] to allow for proper joint functioning as indicated by Functional Deficits. [x] Progressing: [] Met: [] Not Met: [] Adjusted  3. Patient will demonstrate an increase in NM recruitment/activation and overall GH and scapular strength to within n5lbs HHD or WNL for proper functional mobility as indicated by patients Functional Deficits. [x] Progressing: [] Met: [] Not Met: [] Adjusted  4. Patient will return to working out without pain  activities without increased symptoms or restriction. [x] Progressing: [] Met: [] Not Met: [] Adjusted  5. No pain with sleeping(patient specific functional goal)     [x] Progressing: [] Met: [] Not Met: [] Adjusted    ASSESSMENT: Patient has attended 12 physical therapy visits from 11/30/21 through 1/12/22 for treatment s/p left shoulder arthroscopy. Patient is making slow progress, likely as the result of a slip and fall she sustained 2 weeks after surgery and shoulder showing appearance of potential adhesive capsulitis. She has made slight gains in ROM but still far from her uninvolved side in all planes. Significant GH joint hypomobility present as well as weakness of the rotator cuff and periscapular musculature. Good improvement w/ posterior shoulder and cervical mobility by end of session. Patient will benefit from continued physical therapy to address these impairments to allow improved tolerance to repetitive work tasks/positions and return to previous high level resistance training tasks for health and fitness purposes.      Return to Play: (if applicable)    []  Stage 1: Intro to Strength   []  Stage 2: Dynamic Strength and Intro to Plyometrics   []  Stage 3: Advanced Plyometrics and Intro to Throwing   []  Stage 4: Sport specific Training/Return to Sport     []  Ready to Return to Play, [employer]   []  Not Ready for Return to Sports   Comments:      Treatment/Activity Tolerance:  [x] Patient tolerated treatment well [] Patient limited by fatique  [] Patient limited by pain  [] Patient limited by other medical complications  [] Other:     Overall Progression Towards Functional goals/ Treatment Progress Update:  [] Patient is progressing as expected towards functional goals listed. [x] Progression is slowed due to complexities/Impairments listed. - presenting like potential frozen shoulder resulting from fall after surgery  [] Progression has been slowed due to co-morbidities. [] Plan just implemented, too soon to assess goals progression <30days   [] Goals require adjustment due to lack of progress  [] Patient is not progressing as expected and requires additional follow up with physician  [] Other    Prognosis for POC: [x] Good [] Fair  [] Poor    Patient requires continued skilled intervention: [x] Yes  [] No      PLAN:  Continue PT 2x/week for 4-6 weeks to include Manual, DN, postural progression, rotator cuff and periscapular strengthening. [x] Continue per plan of care [] Alter current plan (see comments)  [] Plan of care initiated [] Hold pending MD visit [] Discharge    Electronically signed by: Renetta Abreu, PT, DPT    Note: If patient does not return for scheduled/recommended follow up visits, this note will serve as a discharge from care along with the most recent update on progress.

## 2022-01-14 ENCOUNTER — HOSPITAL ENCOUNTER (OUTPATIENT)
Dept: PHYSICAL THERAPY | Age: 47
Setting detail: THERAPIES SERIES
Discharge: HOME | End: 2022-01-14
Payer: COMMERCIAL

## 2022-01-14 PROCEDURE — 97140 MANUAL THERAPY 1/> REGIONS: CPT

## 2022-01-14 NOTE — FLOWSHEET NOTE
[facility] [address] [address]  Phone: [phone] Fax: [phone]      Physical Therapy Treatment Note/ Progress Report:     Date:  2022    Patient Name:  Casi Frias    :  1975  MRN: [id number]  Restrictions/Precautions:    Medical/Treatment Diagnosis Information:  · Diagnosis: L shoulder arthroscopy, SAD, DCE, shoulder pain M25. 512  · Treatment Diagnosis: L shoulder arthroscopy, SAD, DCE, shoulder pain M25. 458  Insurance/Certification information:     Physician Information:  Referring Practitioner: Dr Cindy Murrieta of care signed (Y/N):     Date of Patient follow up with Physician:      Progress Report: []  Yes  [x]  No     Date Range for reporting period:  Beginnin21  Endin22     Progress report due (10 Rx/or 30 days whichever is less):    42    Recertification due (POC duration/ or 90 days whichever is less):   22    Visit # Insurance Allowable Auth Needed   13 40 []Yes    []No     Pain level:  2-3/10     SUBJECTIVE:  States she has been pretty sore through front and back/side of shoulder blade. Pt had \"electric\" feeling go down the shoulder and upper arm a couple of times while at work yesterday. Pt states that she was doing better for a little while, but the shoulder has regressed and feels tight and painful. OBJECTIVE:    Observation:    Test measurements:         ROM Left Right   Shoulder Flex 115 (140) 170   Shoulder Abd 85 (100) 170   Shoulder ER @ 90 (40) 90   Shoulder IR L4 (25) T5                   Strength  Left Right   Shoulder Flex 0.0 lb 8.1 lb   Shoulder Scap 0.0 lb 8.0 lb   Shoulder ER 9.7 lb 15.7 lb   Shoulder IR 14.6 lb 20.0 lb         RESTRICTIONS/PRECAUTIONS: L shoulder arthroscopy 21.  L lower cervical radic    Exercises/Interventions:   Therapeutic Ex (91036)  Min: 5 Sets/sec Reps CUES/Notes   UBE  4 min 1/2 retro   Cane AAROM flexion - supine 10 10 3#   Supine wand ER stretch @ 90 10 10    Cross body adduction stretch 10 10 Low elbow height   Sleeper stretch 30 3    IR towel stretch behind back 10s 10    3 way Isomet 5 sec 12 ER/IR   T- band Row/pinch      T- band lower pinch      T- band ER/IR  1 12 red   Supine SA punch 2 10    SL ER/SL punch 2 10 Each   Prone Rows/ext      Prone HAB/Prone Flex      Seat Table Slides - abduction 10s 10    Wall slides - flexion/abduction 10s 10    Doorway ER stretch - 60 deg elevation 10s 10    Seated HH Depression      No Money      Scap Wall Lat touches/wall walks      Half wall lat stretch 30 3    Standing flex/scap      Standing Punch      Lawnmower                              Manual Intervention  (90559)  Min: 25      L GH inferior and posterior glides  5 min Gr. II-III   L posterior capsule stretch - prone, towel roll under anterior shoulder   min Gr. II-III   L shoulder PROM - flex, abd, ER  5 min    L scapular pin and stretch - OH flex  5 min    CT junction, T/S mobs/manips  5 min Gr. III-IV, Gr. V   1st rib mobs  5 min    DN + estim            NMR Re-education (55043)      T-spine Ext      GH depress/compress      Scap/GH NMR      Body blade      Wall ball roll      Wall Ball bounce      Ball drops      Dionne Scap Bio      Floor Snow angels-sliders            Therapeutic Activity (14910)      UE throwing porgression      Dynamic UE stability      Earthquake Bar      Bodyblade                Therapeutic Exercise and NMR EXR  [x] (12496) Provided verbal/tactile cueing for activities related to strengthening, flexibility, endurance, ROM  for improvements in scapular, scapulothoracic and UE control with self care, reaching, carrying, lifting, house/yardwork, driving/computer work.    [] (81300) Provided verbal/tactile cueing for activities related to improving balance, coordination, kinesthetic sense, posture, motor skill, proprioception  to assist with  scapular, scapulothoracic and UE control with self care, reaching, carrying, lifting, house/yardwork, driving/computer work. Therapeutic Activities:    [] (43773 or 81565) Provided verbal/tactile cueing for activities related to improving balance, coordination, kinesthetic sense, posture, motor skill, proprioception and motor activation to allow for proper function of scapular, scapulothoracic and UE control with self care, carrying, lifting, driving/computer work.      Home Exercise Program:    [x] (42031) Reviewed/Progressed HEP activities related to strengthening, flexibility, endurance, ROM of scapular, scapulothoracic and UE control with self care, reaching, carrying, lifting, house/yardwork, driving/computer work  [] (15585) Reviewed/Progressed HEP activities related to improving balance, coordination, kinesthetic sense, posture, motor skill, proprioception of scapular, scapulothoracic and UE control with self care, reaching, carrying, lifting, house/yardwork, driving/computer work      Manual Treatments:  PROM / STM / Oscillations-Mobs:  G-I, II, III, IV (PA's, Inf., Post.)  [x] (23852) Provided manual therapy to mobilize soft tissue/joints of cervical/CT, scapular GHJ and UE for the purpose of modulating pain, promoting relaxation,  increasing ROM, reducing/eliminating soft tissue swelling/inflammation/restriction, improving soft tissue extensibility and allowing for proper ROM for normal function with self care, reaching, carrying, lifting, house/yardwork, driving/computer work    Modalities:      Charges:  Timed Code Treatment Minutes: 30   Total Treatment Minutes: 45       [] EVAL (LOW) 66764 (typically 20 minutes face-to-face)  [] EVAL (MOD) 27452 (typically 30 minutes face-to-face)  [] EVAL (HIGH) 70958 (typically 45 minutes face-to-face)  [] RE-EVAL     [] PA(99654) x    [] DRY NEEDLE 1 OR 2 MUSCLES  [] NMR (12968) x     [] DRY NEEDLE 3+ MUSCLES  [x] Manual ([id number]) x 2      [] TA (66704) x     [] Mech Traction (85973)  [] ES(attended) (27755)     [] ES (un) (34262): [] VASO (11808)  [] Other:     GOALS:  Patient stated goal: work pain free  [x] Progressing: [] Met: [] Not Met: [] Adjusted    Therapist goals for Patient:   Short Term Goals: To be achieved in: 2 weeks  1. Independent in HEP and progression per patient tolerance, in order to prevent re-injury. [] Progressing: [x] Met: [] Not Met: [] Adjusted  2. Patient will have a decrease in pain to facilitate improvement in movement, function, and ADLs as indicated by Functional Deficits. [x] Progressing: [] Met: [] Not Met: [] Adjusted    Long Term Goals: To be achieved in: 10 weeks  1. Disability index score of 10% or less for the Quick DASH to assist with reaching prior level of function. [x] Progressing: [] Met: [] Not Met: [] Adjusted  2. Patient will demonstrate increased AROM to [facility] to allow for proper joint functioning as indicated by Functional Deficits. [x] Progressing: [] Met: [] Not Met: [] Adjusted  3. Patient will demonstrate an increase in NM recruitment/activation and overall GH and scapular strength to within n5lbs HHD or WNL for proper functional mobility as indicated by patients Functional Deficits. [x] Progressing: [] Met: [] Not Met: [] Adjusted  4. Patient will return to working out without pain  activities without increased symptoms or restriction. [x] Progressing: [] Met: [] Not Met: [] Adjusted  5. No pain with sleeping(patient specific functional goal)     [x] Progressing: [] Met: [] Not Met: [] Adjusted    ASSESSMENT: Patient has attended 12 physical therapy visits from 11/30/21 through 1/12/22 for treatment s/p left shoulder arthroscopy. Patient is making slow progress, likely as the result of a slip and fall she sustained 2 weeks after surgery and shoulder showing appearance of potential adhesive capsulitis. She has made slight gains in ROM but still far from her uninvolved side in all planes.  Significant GH joint hypomobility present as well as weakness of the rotator cuff and periscapular musculature. Improvement w/ posterior shoulder and cervical mobility by end of session. Patient will benefit from continued physical therapy to address these impairments to allow improved tolerance to repetitive work tasks/positions and return to previous high level resistance training tasks for health and fitness purposes. Return to Play: (if applicable)    []  Stage 1: Intro to Strength   []  Stage 2: Dynamic Strength and Intro to Plyometrics   []  Stage 3: Advanced Plyometrics and Intro to Throwing   []  Stage 4: Sport specific Training/Return to Sport     []  Ready to Return to Play, [employer]   []  Not Ready for Return to Sports   Comments:      Treatment/Activity Tolerance:  [x] Patient tolerated treatment well [] Patient limited by fatique  [] Patient limited by pain  [] Patient limited by other medical complications  [] Other:     Overall Progression Towards Functional goals/ Treatment Progress Update:  [] Patient is progressing as expected towards functional goals listed. [x] Progression is slowed due to complexities/Impairments listed. - presenting like potential frozen shoulder resulting from fall after surgery  [] Progression has been slowed due to co-morbidities. [] Plan just implemented, too soon to assess goals progression <30days   [] Goals require adjustment due to lack of progress  [] Patient is not progressing as expected and requires additional follow up with physician  [] Other    Prognosis for POC: [x] Good [] Fair  [] Poor    Patient requires continued skilled intervention: [x] Yes  [] No      PLAN: Supervising PT to contact Dr. Bg Valdes to update him on slowed progress. Continue PT 2x/week for 4-6 weeks to include Manual, DN, postural progression, rotator cuff and periscapular strengthening.    [x] Continue per plan of care [] Alter current plan (see comments)  [] Plan of care initiated [] Hold pending MD visit [] Discharge    Electronically signed by: Claudette Mallory Adelaida Jean-Baptiste, [facility]    Note: If patient does not return for scheduled/recommended follow up visits, this note will serve as a discharge from care along with the most recent update on progress.

## 2022-01-17 ENCOUNTER — APPOINTMENT (OUTPATIENT)
Dept: PHYSICAL THERAPY | Age: 47
End: 2022-01-17
Payer: COMMERCIAL

## 2022-01-18 ENCOUNTER — APPOINTMENT (OUTPATIENT)
Dept: PHYSICAL THERAPY | Age: 47
End: 2022-01-18
Payer: COMMERCIAL

## 2022-01-18 ENCOUNTER — HOSPITAL ENCOUNTER (OUTPATIENT)
Dept: PHYSICAL THERAPY | Age: 47
Setting detail: THERAPIES SERIES
Discharge: HOME | End: 2022-01-18
Payer: COMMERCIAL

## 2022-01-18 PROCEDURE — 97140 MANUAL THERAPY 1/> REGIONS: CPT

## 2022-01-18 PROCEDURE — 97110 THERAPEUTIC EXERCISES: CPT
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
## 2022-01-21 ENCOUNTER — HOSPITAL ENCOUNTER (OUTPATIENT)
Dept: PHYSICAL THERAPY | Age: 47
Setting detail: THERAPIES SERIES
Discharge: HOME | End: 2022-01-21
Payer: COMMERCIAL

## 2022-01-21 PROCEDURE — 97140 MANUAL THERAPY 1/> REGIONS: CPT

## 2022-01-21 NOTE — FLOWSHEET NOTE
[facility]    Physical Therapy  Cancellation/No-show Note  Patient Name:  Marielle Dumont  :  1975   Date:  2022  Cancelled visits to date: 1  No-shows to date: 0    For today's appointment patient:  [x]  Cancelled  []  Rescheduled appointment  []  No-show     Reason given by patient:  []  Patient ill  []  Conflicting appointment  []  No transportation    []  Conflict with work  []  No reason given  [x]  Other:     Comments:  Pt has migraine.     Electronically signed by:  Dejon Sherman, [address] [place] [address]

## 2022-01-21 NOTE — FLOWSHEET NOTE
[address] [place] [address]  Phone: [phone] Fax: [phone]      Physical Therapy Treatment Note/ Progress Report:     Date:  2022    Patient Name:  Brown Corona    :  1975  MRN: [id number]  Restrictions/Precautions:    Medical/Treatment Diagnosis Information:  · Diagnosis: L shoulder arthroscopy, SAD, DCE, shoulder pain M25. 512  · Treatment Diagnosis: L shoulder arthroscopy, SAD, DCE, shoulder pain M25. 535  Insurance/Certification information:     Physician Information:  Referring Practitioner: Dr Aleman Favors of care signed (Y/N):     Date of Patient follow up with Physician:      Progress Report: []  Yes  [x]  No     Date Range for reporting period:  Beginnin21  Endin22     Progress report due (10 Rx/or 30 days whichever is less):        Recertification due (POC duration/ or 90 days whichever is less):   22    Visit # Insurance Allowable Auth Needed   15 40 []Yes    []No     Pain level:  2-310     SUBJECTIVE:  Pt needed to move her appointment to a later time today due to having a migraine. Pt thinks this may be due to some of the weather changes approaching. Feeling very tight through the base of her neck and upper ribs. Shoulder feeling a little better since having the injection, though she cannot tell is she has better motion yet. OBJECTIVE:    Observation:    Test measurements:       ROM Left Right   Shoulder Flex 115 (140) 170   Shoulder Abd 85 (100) 170   Shoulder ER @ 90 (40) 90   Shoulder IR L4 (25) T5                   Strength  Left Right   Shoulder Flex 0.0 lb 8.1 lb   Shoulder Scap 0.0 lb 8.0 lb   Shoulder ER 9.7 lb 15.7 lb   Shoulder IR 14.6 lb 20.0 lb       RESTRICTIONS/PRECAUTIONS: L shoulder arthroscopy 21.  L lower cervical radic    Exercises/Interventions:   Therapeutic Ex (40263)  Min: 4 Sets/sec Reps CUES/Notes   UBE  4 min 1/2 retro   Wilbur Sins flexion - supine 10 10 3#   Supine wand ER stretch @ 90 10 10    Cross body adduction stretch 10 10 Low elbow height   Sleeper stretch 30 3    IR towel stretch behind back 10s 10    3 way Isomet 5 sec 12 ER/IR   T- band Row/pinch      T- band lower pinch      T- band ER/IR  1 12 red   Supine SA punch 2 10    SL ER/SL punch 2 10 Each   Prone Rows/ext      Prone HAB/Prone Flex      Seat Table Slides - abduction 10s 10    table slides - flexion/abduction 10s 10    Doorway ER stretch - 60 deg elevation 10s 10    Seated HH Depression      No Money      Scap Wall Lat touches/wall walks      Half wall lat stretch 30 3    Standing flex/scap      Standing Punch      Lawnmower      1st rib self mob 1 10                      Manual Intervention  (39681)  Min: 30      L GH inferior and posterior glides  8 min Gr. II-III   L posterior capsule stretch - prone, towel roll under anterior shoulder   min Gr. II-III   L shoulder PROM - flex, abd, ER  10 min    L scapular pin and stretch - OH flex   min    CT junction, T/S mobs/manips   min Gr. III-IV, Gr. V   1st rib mobs  5 min    STM  7 min UT/levator bilat         NMR Re-education (36209)      T-spine Ext      GH depress/compress      Scap/GH NMR      Body blade      Wall ball roll      Wall Ball bounce      Ball drops      Dionne Scap Bio      Floor Snow angels-sliders            Therapeutic Activity (79043)      UE throwing porgression      Dynamic UE stability      Earthquake Bar      Bodyblade                Therapeutic Exercise and NMR EXR  [x] (00241) Provided verbal/tactile cueing for activities related to strengthening, flexibility, endurance, ROM  for improvements in scapular, scapulothoracic and UE control with self care, reaching, carrying, lifting, house/yardwork, driving/computer work.    [] (86376) Provided verbal/tactile cueing for activities related to improving balance, coordination, kinesthetic sense, posture, motor skill, proprioception  to assist with  scapular, scapulothoracic and UE control with self care, reaching, carrying, lifting, house/yardwork, driving/computer work. Therapeutic Activities:    [] (86729 or 09819) Provided verbal/tactile cueing for activities related to improving balance, coordination, kinesthetic sense, posture, motor skill, proprioception and motor activation to allow for proper function of scapular, scapulothoracic and UE control with self care, carrying, lifting, driving/computer work.      Home Exercise Program:    [x] (80018) Reviewed/Progressed HEP activities related to strengthening, flexibility, endurance, ROM of scapular, scapulothoracic and UE control with self care, reaching, carrying, lifting, house/yardwork, driving/computer work  [] (74143) Reviewed/Progressed HEP activities related to improving balance, coordination, kinesthetic sense, posture, motor skill, proprioception of scapular, scapulothoracic and UE control with self care, reaching, carrying, lifting, house/yardwork, driving/computer work      Manual Treatments:  PROM / STM / Oscillations-Mobs:  G-I, II, III, IV (PA's, Inf., Post.)  [x] (24024) Provided manual therapy to mobilize soft tissue/joints of cervical/CT, scapular GHJ and UE for the purpose of modulating pain, promoting relaxation,  increasing ROM, reducing/eliminating soft tissue swelling/inflammation/restriction, improving soft tissue extensibility and allowing for proper ROM for normal function with self care, reaching, carrying, lifting, house/yardwork, driving/computer work    Modalities:      Charges:  Timed Code Treatment Minutes: 34   Total Treatment Minutes: 34       [] EVAL (LOW) 91012 (typically 20 minutes face-to-face)  [] EVAL (MOD) 01816 (typically 30 minutes face-to-face)  [] EVAL (HIGH) 11239 (typically 45 minutes face-to-face)  [] RE-EVAL     [] HG(66145) x    [] DRY NEEDLE 1 OR 2 MUSCLES  [] NMR (23545) x     [] DRY NEEDLE 3+ MUSCLES  [x] Manual (12695) x 2      [] TA (15114) x     [] Mech Traction (74758)  [] ES(attended) (15673)     [] ES (un) (32789):   [] VASO (22971)  [] Other:     GOALS:  Patient stated goal: work pain free  [x] Progressing: [] Met: [] Not Met: [] Adjusted    Therapist goals for Patient:   Short Term Goals: To be achieved in: 2 weeks  1. Independent in HEP and progression per patient tolerance, in order to prevent re-injury. [] Progressing: [x] Met: [] Not Met: [] Adjusted  2. Patient will have a decrease in pain to facilitate improvement in movement, function, and ADLs as indicated by Functional Deficits. [x] Progressing: [] Met: [] Not Met: [] Adjusted    Long Term Goals: To be achieved in: 10 weeks  1. Disability index score of 10% or less for the Quick DASH to assist with reaching prior level of function. [x] Progressing: [] Met: [] Not Met: [] Adjusted  2. Patient will demonstrate increased AROM to [facility] to allow for proper joint functioning as indicated by Functional Deficits. [x] Progressing: [] Met: [] Not Met: [] Adjusted  3. Patient will demonstrate an increase in NM recruitment/activation and overall GH and scapular strength to within n5lbs HHD or WNL for proper functional mobility as indicated by patients Functional Deficits. [x] Progressing: [] Met: [] Not Met: [] Adjusted  4. Patient will return to working out without pain  activities without increased symptoms or restriction. [x] Progressing: [] Met: [] Not Met: [] Adjusted  5. No pain with sleeping(patient specific functional goal)     [x] Progressing: [] Met: [] Not Met: [] Adjusted    ASSESSMENT:  Decreased guarding with improved shoulder mobility p manual therapy today. Injection appears to be helping with the overall tightness and guarding associated with the neck and shoulder. She has made slight gains in ROM but still far from her uninvolved side in all planes. Significant GH joint hypomobility present as well as weakness of the rotator cuff and periscapular musculature.  Improvement w/ posterior shoulder and cervical mobility by end of session. Less guarding noted through shoulder mobilizations and PROM today since injection yesterday, needs continued aggressive manual to restore ROM. Patient will benefit from continued physical therapy to address these impairments to allow improved tolerance to repetitive work tasks/positions and return to previous high level resistance training tasks for health and fitness purposes. Return to Play: (if applicable)    []  Stage 1: Intro to Strength   []  Stage 2: Dynamic Strength and Intro to Plyometrics   []  Stage 3: Advanced Plyometrics and Intro to Throwing   []  Stage 4: Sport specific Training/Return to Sport     []  Ready to Return to Play, [employer]   []  Not Ready for Return to Sports   Comments:      Treatment/Activity Tolerance:  [x] Patient tolerated treatment well [] Patient limited by fatique  [] Patient limited by pain  [] Patient limited by other medical complications  [] Other:     Overall Progression Towards Functional goals/ Treatment Progress Update:  [] Patient is progressing as expected towards functional goals listed. [x] Progression is slowed due to complexities/Impairments listed. - presenting like potential frozen shoulder resulting from fall after surgery  [] Progression has been slowed due to co-morbidities. [] Plan just implemented, too soon to assess goals progression <30days   [] Goals require adjustment due to lack of progress  [] Patient is not progressing as expected and requires additional follow up with physician  [] Other    Prognosis for POC: [x] Good [] Fair  [] Poor    Patient requires continued skilled intervention: [x] Yes  [] No      PLAN: Continue per Pt emily. Continue PT 2x/week for 4-6 weeks to include Manual, DN, postural progression, rotator cuff and periscapular strengthening.    [x] Continue per plan of care [] Alter current plan (see comments)  [] Plan of care initiated [] Hold pending MD visit [] Discharge    Electronically signed by: Yair Cox PTA     Note: If patient does not return for scheduled/recommended follow up visits, this note will serve as a discharge from care along with the most recent update on progress.

## 2022-01-25 ENCOUNTER — APPOINTMENT (OUTPATIENT)
Dept: PHYSICAL THERAPY | Age: 47
End: 2022-01-25
Payer: COMMERCIAL

## 2022-01-28 ENCOUNTER — HOSPITAL ENCOUNTER (OUTPATIENT)
Dept: PHYSICAL THERAPY | Age: 47
Setting detail: THERAPIES SERIES
Discharge: HOME | End: 2022-01-28
Payer: COMMERCIAL

## 2022-01-28 PROCEDURE — 97140 MANUAL THERAPY 1/> REGIONS: CPT

## 2022-01-28 NOTE — FLOWSHEET NOTE
[address] [place] [address] [place] [address]  Phone: [phone] Fax: [phone]      Physical Therapy Treatment Note/ Progress Report:     Date:  2022    Patient Name:  Erika Ortiz    :  1975  MRN: [id number]  Restrictions/Precautions:    Medical/Treatment Diagnosis Information:  · Diagnosis: L shoulder arthroscopy, SAD, DCE, shoulder pain M25. 512  · Treatment Diagnosis: L shoulder arthroscopy, SAD, DCE, shoulder pain M25. 928  Insurance/Certification information:     Physician Information:  Referring Practitioner: Dr Odalys Childress of care signed (Y/N):     Date of Patient follow up with Physician:      Progress Report: []  Yes  [x]  No     Date Range for reporting period:  Beginnin21  Endin22     Progress report due (10 Rx/or 30 days whichever is less):    3/64/31    Recertification due (POC duration/ or 90 days whichever is less):   22    Visit # Insurance Allowable Auth Needed   16 40 []Yes    []No     Pain level:  2-310     SUBJECTIVE:  No significant change at this point- still quite sore and stiff. Working hard everyday at motion. OBJECTIVE:    Observation:    Test measurements:       ROM Left Right   Shoulder Flex 115 (140) 170   Shoulder Abd 85 (100) 170   Shoulder ER @ 90 (40) 90   Shoulder IR L4 (25) T5                   Strength  Left Right   Shoulder Flex 0.0 lb 8.1 lb   Shoulder Scap 0.0 lb 8.0 lb   Shoulder ER 9.7 lb 15.7 lb   Shoulder IR 14.6 lb 20.0 lb       RESTRICTIONS/PRECAUTIONS: L shoulder arthroscopy 21.  L lower cervical radic    Exercises/Interventions:   Therapeutic Ex (32844)  Min: 4 Sets/sec Reps CUES/Notes   UBE  4 min 1/2 retro   Cane AAROM flexion - supine 10 10 3#   Supine wand ER stretch @ 90 10 10    Cross body adduction stretch 10 10 Low elbow height   Sleeper stretch 30 3    IR towel stretch behind back 10s 10    3 way Isomet   ER/IR   T- band Row/pinch      T- band lower pinch      T- band ER/IR  1 12 red   Supine SA punch 2 10    SL ER/SL punch 2 10 Each   Prone Rows/ext      Prone HAB/Prone Flex      Seat Table Slides - abduction 10s 10    table slides - flexion/abduction 10s 10    Doorway ER stretch - 60 deg elevation 10s 10    Seated HH Depression      No Money      Scap Wall Lat touches/wall walks      Half wall lat stretch 30 3    Standing flex/scap      Standing Punch      Lawnmower      1st rib self mob 1 10                      Manual Intervention  (42100)  Min: 35      L GH inferior and posterior glides  8 min Gr. II-III   L posterior capsule stretch - prone, towel roll under anterior shoulder   min Gr. II-III   L shoulder PROM - flex, abd, ER  10 min    L scapular pin and stretch - OH flex   min    CT junction, T/S mobs/manips  5 min Gr. III-IV, Gr. V   1st rib mobs  5 min    STM  7 min UT/levator bilat         NMR Re-education (50670)      T-spine Ext      GH depress/compress      Scap/GH NMR      Body blade      Wall ball roll      Wall Ball bounce      Ball drops      Dionne Scap Bio      Floor Snow angels-sliders            Therapeutic Activity (17816)      UE throwing porgression      Dynamic UE stability      Earthquake Bar      Bodyblade                Therapeutic Exercise and NMR EXR  [x] (44105) Provided verbal/tactile cueing for activities related to strengthening, flexibility, endurance, ROM  for improvements in scapular, scapulothoracic and UE control with self care, reaching, carrying, lifting, house/yardwork, driving/computer work.    [] (15949) Provided verbal/tactile cueing for activities related to improving balance, coordination, kinesthetic sense, posture, motor skill, proprioception  to assist with  scapular, scapulothoracic and UE control with self care, reaching, carrying, lifting, house/yardwork, driving/computer work.     Therapeutic Activities:    [] (92664 or ) Provided verbal/tactile cueing for activities related to improving balance, coordination, kinesthetic sense, posture, motor skill, proprioception and motor activation to allow for proper function of scapular, scapulothoracic and UE control with self care, carrying, lifting, driving/computer work.      Home Exercise Program:    [x] (93712) Reviewed/Progressed HEP activities related to strengthening, flexibility, endurance, ROM of scapular, scapulothoracic and UE control with self care, reaching, carrying, lifting, house/yardwork, driving/computer work  [] (69574) Reviewed/Progressed HEP activities related to improving balance, coordination, kinesthetic sense, posture, motor skill, proprioception of scapular, scapulothoracic and UE control with self care, reaching, carrying, lifting, house/yardwork, driving/computer work      Manual Treatments:  PROM / STM / Oscillations-Mobs:  G-I, II, III, IV (PA's, Inf., Post.)  [x] (56034) Provided manual therapy to mobilize soft tissue/joints of cervical/CT, scapular GHJ and UE for the purpose of modulating pain, promoting relaxation,  increasing ROM, reducing/eliminating soft tissue swelling/inflammation/restriction, improving soft tissue extensibility and allowing for proper ROM for normal function with self care, reaching, carrying, lifting, house/yardwork, driving/computer work    Modalities:      Charges:  Timed Code Treatment Minutes: 35   Total Treatment Minutes: 35       [] EVAL (LOW) 31390 (typically 20 minutes face-to-face)  [] EVAL (MOD) 76313 (typically 30 minutes face-to-face)  [] EVAL (HIGH) 21778 (typically 45 minutes face-to-face)  [] RE-EVAL     [] TS(39505) x   [] DRY NEEDLE 1 OR 2 MUSCLES  [] NMR (15651) x     [] DRY NEEDLE 3+ MUSCLES  [x] Manual (02786) x 2      [] TA (74271) x     [] Mech Traction (98648)  [] ES(attended) (77861)     [] ES (un) (46779):   [] VASO (24158)  [] Other:     GOALS:  Patient stated goal: work pain free  [x] Progressing: [] Met: [] Not Met: [] Adjusted    Therapist goals for Patient:   Short Term Goals: To be achieved in: 2 weeks  1. Independent in HEP and progression per patient tolerance, in order to prevent re-injury. [] Progressing: [x] Met: [] Not Met: [] Adjusted  2. Patient will have a decrease in pain to facilitate improvement in movement, function, and ADLs as indicated by Functional Deficits. [x] Progressing: [] Met: [] Not Met: [] Adjusted    Long Term Goals: To be achieved in: 10 weeks  1. Disability index score of 10% or less for the Quick DASH to assist with reaching prior level of function. [x] Progressing: [] Met: [] Not Met: [] Adjusted  2. Patient will demonstrate increased AROM to [facility] to allow for proper joint functioning as indicated by Functional Deficits. [x] Progressing: [] Met: [] Not Met: [] Adjusted  3. Patient will demonstrate an increase in NM recruitment/activation and overall GH and scapular strength to within n5lbs HHD or WNL for proper functional mobility as indicated by patients Functional Deficits. [x] Progressing: [] Met: [] Not Met: [] Adjusted  4. Patient will return to working out without pain  activities without increased symptoms or restriction. [x] Progressing: [] Met: [] Not Met: [] Adjusted  5. No pain with sleeping(patient specific functional goal)     [x] Progressing: [] Met: [] Not Met: [] Adjusted    ASSESSMENT:  Significant capsular tightness in the shoulder. Motion not making significant ROM gains.    Return to Play: (if applicable)    []  Stage 1: Intro to Strength   []  Stage 2: Dynamic Strength and Intro to Plyometrics   []  Stage 3: Advanced Plyometrics and Intro to Throwing   []  Stage 4: Sport specific Training/Return to Sport     []  Ready to Return to Play, [employer]   []  Not Ready for Return to Sports   Comments:      Treatment/Activity Tolerance:  [x] Patient tolerated treatment well [] Patient limited by fatique  [] Patient limited by pain  [] Patient limited by other medical complications  [] Other:     Overall Progression Towards Functional goals/ Treatment Progress Update:  [] Patient is progressing as expected towards functional goals listed. [x] Progression is slowed due to complexities/Impairments listed. - presenting like potential frozen shoulder resulting from fall after surgery  [] Progression has been slowed due to co-morbidities. [] Plan just implemented, too soon to assess goals progression <30days   [] Goals require adjustment due to lack of progress  [] Patient is not progressing as expected and requires additional follow up with physician  [] Other    Prognosis for POC: [x] Good [] Fair  [] Poor    Patient requires continued skilled intervention: [x] Yes  [] No      PLAN: Continue per Pt emily. Continue PT 2x/week for 4-6 weeks to include Manual, DN, postural progression, rotator cuff and periscapular strengthening. [x] Continue per plan of care [] Alter current plan (see comments)  [] Plan of care initiated [] Hold pending MD visit [] Discharge    Electronically signed by: Jarrell León, PT     Note: If patient does not return for scheduled/recommended follow up visits, this note will serve as a discharge from care along with the most recent update on progress.

## 2022-01-31 ENCOUNTER — HOSPITAL ENCOUNTER (OUTPATIENT)
Dept: PHYSICAL THERAPY | Age: 47
Setting detail: THERAPIES SERIES
Discharge: HOME | End: 2022-01-31
Payer: COMMERCIAL

## 2022-01-31 PROCEDURE — 97140 MANUAL THERAPY 1/> REGIONS: CPT

## 2022-01-31 NOTE — FLOWSHEET NOTE
[address] [address]  Phone: [phone] Fax: [phone]      Physical Therapy Treatment Note/ Progress Report:     Date:  2022    Patient Name:  Lisa Marie    :  1975  MRN: [id number]  Restrictions/Precautions:    Medical/Treatment Diagnosis Information:  · Diagnosis: L shoulder arthroscopy, SAD, DCE, shoulder pain M25. 512  · Treatment Diagnosis: L shoulder arthroscopy, SAD, DCE, shoulder pain M25. 018  Insurance/Certification information:     Physician Information:  Referring Practitioner: Dr Yady Fajardo of care signed (Y/N):     Date of Patient follow up with Physician:      Progress Report: []  Yes  [x]  No     Date Range for reporting period:  Beginnin21  Endin22     Progress report due (10 Rx/or 30 days whichever is less):    34    Recertification due (POC duration/ or 90 days whichever is less):   22    Visit # Insurance Allowable Auth Needed   17 40 []Yes    []No     Pain level:  2-3/10     SUBJECTIVE:  Patient reports that her shoulder is sore and tight in pec. Does not feel that ROM is improving. Will follow up with Dr. Bg Valdes on the . OBJECTIVE:    Observation:    Test measurements:       ROM Left Right   Shoulder Flex 115 (140) 170   Shoulder Abd 85 (100) 170   Shoulder ER @ 90 (40) 90   Shoulder IR L4 (25) T5                   Strength  Left Right   Shoulder Flex 0.0 lb 8.1 lb   Shoulder Scap 0.0 lb 8.0 lb   Shoulder ER 9.7 lb 15.7 lb   Shoulder IR 14.6 lb 20.0 lb       RESTRICTIONS/PRECAUTIONS: L shoulder arthroscopy 21.  L lower cervical radic    Exercises/Interventions:   Therapeutic Ex (32955)  Min: 4 Sets/sec Reps CUES/Notes   UBE  4 min 1/2 retro   Cane AAROM flexion - supine 10 10 3#   Supine wand ER stretch @ 90 10 10    Cross body adduction stretch 10 10 Low elbow height   Sleeper stretch 30 3    IR towel stretch behind back 10s 10    3 way Isomet   ER/IR   T- band Row/pinch      T- band lower pinch      T- band ER/IR  1 12 red   Supine SA punch 2 10    SL ER/SL punch 2 10 Each   Prone Rows/ext      Prone HAB/Prone Flex      Seat Table Slides - abduction 10s 10    table slides - flexion/abduction 10s 10    Doorway ER stretch - 60 deg elevation 10s 10    Seated HH Depression      No Money      Scap Wall Lat touches/wall walks      Half wall lat stretch 30 3    Standing flex/scap      Standing Punch      Lawnmower      1st rib self mob 1 10                      Manual Intervention  (80044)  Min: 35      L GH inferior and posterior glides  8 min Gr. II-III   L posterior capsule stretch - prone, towel roll under anterior shoulder   min Gr. II-III   L shoulder PROM - flex, abd, ER  10 min    L scapular pin and stretch - OH flex   min    CT junction, T/S mobs/manips  5 min Gr. III-IV, Gr. V   1st rib mobs  5 min    STM  7 min UT/levator bilat         NMR Re-education (38057)      T-spine Ext      GH depress/compress      Scap/GH NMR      Body blade      Wall ball roll      Wall Ball bounce      Ball drops      Dionen Scap Bio      Floor Snow angels-sliders            Therapeutic Activity (11412)      UE throwing porgression      Dynamic UE stability      Earthquake Bar      Bodyblade                Therapeutic Exercise and NMR EXR  [x] (54961) Provided verbal/tactile cueing for activities related to strengthening, flexibility, endurance, ROM  for improvements in scapular, scapulothoracic and UE control with self care, reaching, carrying, lifting, house/yardwork, driving/computer work.    [] (49938) Provided verbal/tactile cueing for activities related to improving balance, coordination, kinesthetic sense, posture, motor skill, proprioception  to assist with  scapular, scapulothoracic and UE control with self care, reaching, carrying, lifting, house/yardwork, driving/computer work.     Therapeutic Activities:    [] (36621 or 64592) Provided verbal/tactile cueing for activities related to improving balance, coordination, kinesthetic sense, posture, motor skill, proprioception and motor activation to allow for proper function of scapular, scapulothoracic and UE control with self care, carrying, lifting, driving/computer work.      Home Exercise Program:    [x] (94044) Reviewed/Progressed HEP activities related to strengthening, flexibility, endurance, ROM of scapular, scapulothoracic and UE control with self care, reaching, carrying, lifting, house/yardwork, driving/computer work  [] (50911) Reviewed/Progressed HEP activities related to improving balance, coordination, kinesthetic sense, posture, motor skill, proprioception of scapular, scapulothoracic and UE control with self care, reaching, carrying, lifting, house/yardwork, driving/computer work      Manual Treatments:  PROM / STM / Oscillations-Mobs:  G-I, II, III, IV (PA's, Inf., Post.)  [x] (69332) Provided manual therapy to mobilize soft tissue/joints of cervical/CT, scapular GHJ and UE for the purpose of modulating pain, promoting relaxation,  increasing ROM, reducing/eliminating soft tissue swelling/inflammation/restriction, improving soft tissue extensibility and allowing for proper ROM for normal function with self care, reaching, carrying, lifting, house/yardwork, driving/computer work    Modalities:      Charges:  Timed Code Treatment Minutes: 35   Total Treatment Minutes: 35       [] EVAL (LOW) 85563 (typically 20 minutes face-to-face)  [] EVAL (MOD) 20346 (typically 30 minutes face-to-face)  [] EVAL (HIGH) 22832 (typically 45 minutes face-to-face)  [] RE-EVAL     [] PJ(76678) x   [] DRY NEEDLE 1 OR 2 MUSCLES  [] NMR (07277) x     [] DRY NEEDLE 3+ MUSCLES  [x] Manual (31727) x 2      [] TA (21033) x     [] Mech Traction (04739)  [] ES(attended) (32674)     [] ES (un) (85316):   [] VASO (73778)  [] Other:     GOALS:  Patient stated goal: work pain free  [x] Progressing: [] Met: [] Not Met: [] Adjusted    Therapist goals for Patient:   Short Term Goals: To be achieved in: 2 weeks  1. Independent in HEP and progression per patient tolerance, in order to prevent re-injury. [] Progressing: [x] Met: [] Not Met: [] Adjusted  2. Patient will have a decrease in pain to facilitate improvement in movement, function, and ADLs as indicated by Functional Deficits. [x] Progressing: [] Met: [] Not Met: [] Adjusted    Long Term Goals: To be achieved in: 10 weeks  1. Disability index score of 10% or less for the Quick DASH to assist with reaching prior level of function. [x] Progressing: [] Met: [] Not Met: [] Adjusted  2. Patient will demonstrate increased AROM to [facility] to allow for proper joint functioning as indicated by Functional Deficits. [x] Progressing: [] Met: [] Not Met: [] Adjusted  3. Patient will demonstrate an increase in NM recruitment/activation and overall GH and scapular strength to within n5lbs HHD or WNL for proper functional mobility as indicated by patients Functional Deficits. [x] Progressing: [] Met: [] Not Met: [] Adjusted  4. Patient will return to working out without pain  activities without increased symptoms or restriction. [x] Progressing: [] Met: [] Not Met: [] Adjusted  5. No pain with sleeping(patient specific functional goal)     [x] Progressing: [] Met: [] Not Met: [] Adjusted    ASSESSMENT:  Significant capsular tightness in the shoulder. Motion not making significant ROM gains.    Return to Play: (if applicable)    []  Stage 1: Intro to Strength   []  Stage 2: Dynamic Strength and Intro to Plyometrics   []  Stage 3: Advanced Plyometrics and Intro to Throwing   []  Stage 4: Sport specific Training/Return to Sport     []  Ready to Return to Play, [employer]   []  Not Ready for Return to Sports   Comments:      Treatment/Activity Tolerance:  [x] Patient tolerated treatment well [] Patient limited by fatique  [] Patient limited by pain  [] Patient limited by other medical complications  [] Other:     Overall Progression Towards Functional goals/ Treatment Progress Update:  [] Patient is progressing as expected towards functional goals listed. [x] Progression is slowed due to complexities/Impairments listed. - presenting like potential frozen shoulder resulting from fall after surgery  [] Progression has been slowed due to co-morbidities. [] Plan just implemented, too soon to assess goals progression <30days   [] Goals require adjustment due to lack of progress  [] Patient is not progressing as expected and requires additional follow up with physician  [] Other    Prognosis for POC: [x] Good [] Fair  [] Poor    Patient requires continued skilled intervention: [x] Yes  [] No      PLAN: Continue per Pt emily. Continue PT 2x/week for 4-6 weeks to include Manual, DN, postural progression, rotator cuff and periscapular strengthening. [x] Continue per plan of care [] Alter current plan (see comments)  [] Plan of care initiated [] Hold pending MD visit [] Discharge    Electronically signed by: Mary Siemens, PTA     Note: If patient does not return for scheduled/recommended follow up visits, this note will serve as a discharge from care along with the most recent update on progress.

## 2022-02-04 ENCOUNTER — APPOINTMENT (OUTPATIENT)
Dept: PHYSICAL THERAPY | Age: 47
End: 2022-02-04
Payer: COMMERCIAL

## 2022-02-08 ENCOUNTER — HOSPITAL ENCOUNTER (OUTPATIENT)
Dept: PHYSICAL THERAPY | Age: 47
Setting detail: THERAPIES SERIES
Discharge: HOME | End: 2022-02-08
Payer: COMMERCIAL

## 2022-02-08 PROCEDURE — 97140 MANUAL THERAPY 1/> REGIONS: CPT

## 2022-02-08 PROCEDURE — 97110 THERAPEUTIC EXERCISES: CPT

## 2022-02-08 NOTE — FLOWSHEET NOTE
[facility] [address]  Phone: [phone] Fax: [phone]      Physical Therapy Treatment Note/ Progress Report:     Date:  2022    Patient Name:  Harry Snider    :  1975  MRN: [id number]  Restrictions/Precautions:    Medical/Treatment Diagnosis Information:  · Diagnosis: L shoulder arthroscopy, SAD, DCE, shoulder pain M25. 512  · Treatment Diagnosis: L shoulder arthroscopy, SAD, DCE, shoulder pain M25. 876  Insurance/Certification information:     Physician Information:  Referring Practitioner: Dr Robert Stallworth of care signed (Y/N):     Date of Patient follow up with Physician:      Progress Report: []  Yes  [x]  No     Date Range for reporting period:  Beginnin21  Endin22     Progress report due (10 Rx/or 30 days whichever is less):    87    Recertification due (POC duration/ or 90 days whichever is less):   22    Visit # Insurance Allowable Auth Needed   17 40 []Yes    []No     Pain level:  2-3/10     SUBJECTIVE:  Patient reports that neck is very tight today and elbow has been tight and getting tighter. Sees Dr. Leo Villareal on Thursday. OBJECTIVE:    Observation:    Test measurements:       ROM Left Right   Shoulder Flex 115 (140) 170   Shoulder Abd 85 (100) 170   Shoulder ER @ 90 (40) 90   Shoulder IR L4 (25) T5                   Strength  Left Right   Shoulder Flex 0.0 lb 8.1 lb   Shoulder Scap 0.0 lb 8.0 lb   Shoulder ER 9.7 lb 15.7 lb   Shoulder IR 14.6 lb 20.0 lb       RESTRICTIONS/PRECAUTIONS: L shoulder arthroscopy 21.  L lower cervical radic    Exercises/Interventions:   Therapeutic Ex (46610)  Min: 10 Sets/sec Reps CUES/Notes   UBE  4 min 1/2 retro   Cane AAROM flexion - supine 10 10 3#   Supine wand ER stretch @ 90 10 10    Cross body adduction stretch 10 10 Low elbow height   Sleeper stretch 30 3    IR towel stretch behind back 10s 10    3 way Isomet   ER/IR   T- band Row/pinch      T- band lower pinch      T- band ER/IR  1 12 red   Supine SA punch 2 10    SL ER/SL punch 2 10 Each   Prone Rows/ext      Prone HAB/Prone Flex      Seat Table Slides - abduction 10s 10    table slides - flexion/abduction 10s 10    Doorway ER stretch - 60 deg elevation 10s 10    Seated HH Depression      No Money      Scap Wall Lat touches/wall walks      Half wall lat stretch 30 3    Standing flex/scap      Standing Punch      Lawnmower      1st rib self mob 1 10                      Manual Intervention  (04276)  Min: 35      L GH inferior and posterior glides  8 min Gr. II-III   L posterior capsule stretch - prone, towel roll under anterior shoulder   min Gr. II-III   L shoulder PROM - flex, abd, ER  10 min    L scapular pin and stretch - OH flex   min    CT junction, T/S mobs/manips  5 min Gr. III-IV, Gr. V   1st rib mobs  5 min    STM  7 min UT/levator bilat         NMR Re-education (22900)      T-spine Ext      GH depress/compress      Scap/GH NMR      Body blade      Wall ball roll      Wall Ball bounce      Ball drops      Dionne Scap Bio      Floor Snow angels-sliders            Therapeutic Activity (35169)      UE throwing porgression      Dynamic UE stability      Earthquake Bar      Bodyblade                Therapeutic Exercise and NMR EXR  [x] (39490) Provided verbal/tactile cueing for activities related to strengthening, flexibility, endurance, ROM  for improvements in scapular, scapulothoracic and UE control with self care, reaching, carrying, lifting, house/yardwork, driving/computer work.    [] (10285) Provided verbal/tactile cueing for activities related to improving balance, coordination, kinesthetic sense, posture, motor skill, proprioception  to assist with  scapular, scapulothoracic and UE control with self care, reaching, carrying, lifting, house/yardwork, driving/computer work.     Therapeutic Activities:    [] (26032 or [address]) Provided verbal/tactile cueing for activities related Short Term Goals: To be achieved in: 2 weeks  1. Independent in HEP and progression per patient tolerance, in order to prevent re-injury. [] Progressing: [x] Met: [] Not Met: [] Adjusted  2. Patient will have a decrease in pain to facilitate improvement in movement, function, and ADLs as indicated by Functional Deficits. [x] Progressing: [] Met: [] Not Met: [] Adjusted    Long Term Goals: To be achieved in: 10 weeks  1. Disability index score of 10% or less for the Quick DASH to assist with reaching prior level of function. [x] Progressing: [] Met: [] Not Met: [] Adjusted  2. Patient will demonstrate increased AROM to [facility] to allow for proper joint functioning as indicated by Functional Deficits. [x] Progressing: [] Met: [] Not Met: [] Adjusted  3. Patient will demonstrate an increase in NM recruitment/activation and overall GH and scapular strength to within n5lbs HHD or WNL for proper functional mobility as indicated by patients Functional Deficits. [x] Progressing: [] Met: [] Not Met: [] Adjusted  4. Patient will return to working out without pain  activities without increased symptoms or restriction. [x] Progressing: [] Met: [] Not Met: [] Adjusted  5. No pain with sleeping(patient specific functional goal)     [x] Progressing: [] Met: [] Not Met: [] Adjusted    ASSESSMENT:  Significant capsular tightness in the shoulder. Motion not making significant ROM gains, able to actively elevate to approx. 105 deg today, passive approx 130 w/ less guarding. Pt w/ significant CT mobility restrictions and elevated first rib L > R, improved w/ manual and manipulation w/ improved tolerance to active ROM. Elbow tender to palpation, unable to change w/ ROM or muscle testing.      Return to Play: (if applicable)    []  Stage 1: Intro to Strength   []  Stage 2: Dynamic Strength and Intro to Plyometrics   []  Stage 3: Advanced Plyometrics and Intro to Throwing   []  Stage 4: Sport specific Training/Return to Sport     []  Ready to Return to Play, Meets [employer] Stages   []  Not Ready for Return to Sports   Comments:      Treatment/Activity Tolerance:  [x] Patient tolerated treatment well [] Patient limited by fatique  [] Patient limited by pain  [] Patient limited by other medical complications  [] Other:     Overall Progression Towards Functional goals/ Treatment Progress Update:  [] Patient is progressing as expected towards functional goals listed. [x] Progression is slowed due to complexities/Impairments listed. - presenting like potential frozen shoulder resulting from fall after surgery  [] Progression has been slowed due to co-morbidities. [] Plan just implemented, too soon to assess goals progression <30days   [] Goals require adjustment due to lack of progress  [] Patient is not progressing as expected and requires additional follow up with physician  [] Other    Prognosis for POC: [x] Good [] Fair  [] Poor    Patient requires continued skilled intervention: [x] Yes  [] No      PLAN: Continue per Pt emily. Continue PT 2x/week for 4-6 weeks to include Manual, DN, postural progression, rotator cuff and periscapular strengthening. [x] Continue per plan of care [] Alter current plan (see comments)  [] Plan of care initiated [] Hold pending MD visit [] Discharge    Electronically signed by: Ivy Streeter PT     Note: If patient does not return for scheduled/recommended follow up visits, this note will serve as a discharge from care along with the most recent update on progress.

## 2022-02-11 ENCOUNTER — HOSPITAL ENCOUNTER (OUTPATIENT)
Dept: PHYSICAL THERAPY | Age: 47
Setting detail: THERAPIES SERIES
Discharge: HOME | End: 2022-02-11
Payer: COMMERCIAL

## 2022-02-11 PROCEDURE — 97110 THERAPEUTIC EXERCISES: CPT

## 2022-02-11 PROCEDURE — 97140 MANUAL THERAPY 1/> REGIONS: CPT

## 2022-02-11 NOTE — FLOWSHEET NOTE
[address] [place] [address] [place] [address]  Phone: [phone] Fax: [phone]      Physical Therapy Treatment Note/ Progress Report:     Date:  2022    Patient Name:  Mendoza Barger    :  1975  MRN: [id number]  Restrictions/Precautions:    Medical/Treatment Diagnosis Information:  · Diagnosis: L shoulder arthroscopy, SAD, DCE, shoulder pain M25. 512  · Treatment Diagnosis: L shoulder arthroscopy, SAD, DCE, shoulder pain M25. 565  Insurance/Certification information:     Physician Information:  Referring Practitioner: Dr Darrius Fuentes of care signed (Y/N):     Date of Patient follow up with Physician:      Progress Report: []  Yes  [x]  No     Date Range for reporting period:  Beginnin21  Endin22     Progress report due (10 Rx/or 30 days whichever is less):    22    Recertification due (POC duration/ or 90 days whichever is less):   22    Visit # Insurance Allowable Auth Needed   18 40 []Yes    []No     Pain level:  2-3/10     SUBJECTIVE:  Patient reports that she saw Dr. Jenna Barcenas who ordered an MRI to rule out RC involvement. She admits to being tight through neck today after working with arms up a bunch today. OBJECTIVE:    Observation:    Test measurements:       ROM Left Right   Shoulder Flex 115 (140) 170   Shoulder Abd 85 (100) 170   Shoulder ER @ 90 (40) 90   Shoulder IR L4 (25) T5                   Strength  Left Right   Shoulder Flex 0.0 lb 8.1 lb   Shoulder Scap 0.0 lb 8.0 lb   Shoulder ER 9.7 lb 15.7 lb   Shoulder IR 14.6 lb 20.0 lb       RESTRICTIONS/PRECAUTIONS: L shoulder arthroscopy 21.  L lower cervical radic    Exercises/Interventions:   Therapeutic Ex (61322)  Min: 10 Sets/sec Reps CUES/Notes   UBE  4 min 1/2 retro   Cane AAROM flexion - supine 10 10 3#   Supine wand ER stretch @ 90 10 10    Cross body adduction stretch 10 10 Low elbow height   Sleeper stretch 30 3    IR towel stretch behind back 10s 10    3 way Isomet   ER/IR   T- band Row/pinch      T- band lower pinch      T- band ER/IR  1 12 red   Supine SA punch 2 10    SL ER/SL punch 2 10 Each   Prone Rows/ext      Prone HAB/Prone Flex      Seat Table Slides - abduction 10s 10    table slides - flexion/abduction 10s 10    Doorway ER stretch - 60 deg elevation 10s 10    Seated HH Depression      No Money      Scap Wall Lat touches/wall walks      Half wall lat stretch 30 3    Standing flex/scap      Standing Punch      Lawnmower      1st rib self mob 1 10                      Manual Intervention  (89650)  Min: 35      L GH inferior and posterior glides  8 min Gr. II-III   L posterior capsule stretch - prone, towel roll under anterior shoulder   min Gr. II-III   L shoulder PROM - flex, abd, ER  10 min    L scapular pin and stretch - OH flex   min    CT junction, T/S mobs/manips  5 min Gr. III-IV, Gr. V   1st rib mobs  5 min    STM  7 min UT/levator bilat         NMR Re-education (38512)      T-spine Ext      GH depress/compress      Scap/GH NMR      Body blade      Wall ball roll      Wall Ball bounce      Ball drops      Dionne Scap Bio      Floor Snow angels-sliders            Therapeutic Activity (88062)      UE throwing porgression      Dynamic UE stability      Earthquake Bar      Bodyblade                Therapeutic Exercise and NMR EXR  [x] (74443) Provided verbal/tactile cueing for activities related to strengthening, flexibility, endurance, ROM  for improvements in scapular, scapulothoracic and UE control with self care, reaching, carrying, lifting, house/yardwork, driving/computer work.    [] (92648) Provided verbal/tactile cueing for activities related to improving balance, coordination, kinesthetic sense, posture, motor skill, proprioception  to assist with  scapular, scapulothoracic and UE control with self care, reaching, carrying, lifting, house/yardwork, driving/computer work.     Therapeutic Activities:    [] (87881 or 45596) Provided verbal/tactile cueing for activities related to improving balance, coordination, kinesthetic sense, posture, motor skill, proprioception and motor activation to allow for proper function of scapular, scapulothoracic and UE control with self care, carrying, lifting, driving/computer work.      Home Exercise Program:    [x] (61899) Reviewed/Progressed HEP activities related to strengthening, flexibility, endurance, ROM of scapular, scapulothoracic and UE control with self care, reaching, carrying, lifting, house/yardwork, driving/computer work  [] (64556) Reviewed/Progressed HEP activities related to improving balance, coordination, kinesthetic sense, posture, motor skill, proprioception of scapular, scapulothoracic and UE control with self care, reaching, carrying, lifting, house/yardwork, driving/computer work      Manual Treatments:  PROM / STM / Oscillations-Mobs:  G-I, II, III, IV (PA's, Inf., Post.)  [x] (41648) Provided manual therapy to mobilize soft tissue/joints of cervical/CT, scapular GHJ and UE for the purpose of modulating pain, promoting relaxation,  increasing ROM, reducing/eliminating soft tissue swelling/inflammation/restriction, improving soft tissue extensibility and allowing for proper ROM for normal function with self care, reaching, carrying, lifting, house/yardwork, driving/computer work    Modalities:      Charges:  Timed Code Treatment Minutes: 40   Total Treatment Minutes: 40       [] EVAL (LOW) 62226 (typically 20 minutes face-to-face)  [] EVAL (MOD) 03739 (typically 30 minutes face-to-face)  [] EVAL (HIGH) 28426 (typically 45 minutes face-to-face)  [] RE-EVAL     [x] DU(88879) x 1  [] DRY NEEDLE 1 OR 2 MUSCLES  [] NMR (23092) x     [] DRY NEEDLE 3+ MUSCLES  [x] Manual (98032) x 2      [] TA (61409) x     [] Mech Traction (15886)  [] ES(attended) (34296)     [] ES (un) (90 361773):   [] VASO (24824)  [] Other:     GOALS:  Patient stated goal: work pain free  [x] Progressing: [] Met: [] Not Met: [] Adjusted    Therapist goals for Patient:   Short Term Goals: To be achieved in: 2 weeks  1. Independent in HEP and progression per patient tolerance, in order to prevent re-injury. [] Progressing: [x] Met: [] Not Met: [] Adjusted  2. Patient will have a decrease in pain to facilitate improvement in movement, function, and ADLs as indicated by Functional Deficits. [x] Progressing: [] Met: [] Not Met: [] Adjusted    Long Term Goals: To be achieved in: 10 weeks  1. Disability index score of 10% or less for the Quick DASH to assist with reaching prior level of function. [x] Progressing: [] Met: [] Not Met: [] Adjusted  2. Patient will demonstrate increased AROM to [employer] to allow for proper joint functioning as indicated by Functional Deficits. [x] Progressing: [] Met: [] Not Met: [] Adjusted  3. Patient will demonstrate an increase in NM recruitment/activation and overall GH and scapular strength to within n5lbs HHD or WNL for proper functional mobility as indicated by patients Functional Deficits. [x] Progressing: [] Met: [] Not Met: [] Adjusted  4. Patient will return to working out without pain  activities without increased symptoms or restriction. [x] Progressing: [] Met: [] Not Met: [] Adjusted  5. No pain with sleeping(patient specific functional goal)     [x] Progressing: [] Met: [] Not Met: [] Adjusted    ASSESSMENT:  Patient had significant tightness throughout cervical spine and shoulder today.       Return to Play: (if applicable)    []  Stage 1: Intro to Strength   []  Stage 2: Dynamic Strength and Intro to Plyometrics   []  Stage 3: Advanced Plyometrics and Intro to Throwing   []  Stage 4: Sport specific Training/Return to Sport     []  Ready to Return to Play, [employer]   []  Not Ready for Return to Sports   Comments:      Treatment/Activity Tolerance:  [x] Patient tolerated treatment well [] Patient limited by fatique  [] Patient limited by pain  [] Patient limited by other medical complications  [] Other:     Overall Progression Towards Functional goals/ Treatment Progress Update:  [] Patient is progressing as expected towards functional goals listed. [x] Progression is slowed due to complexities/Impairments listed. - presenting like potential frozen shoulder resulting from fall after surgery  [] Progression has been slowed due to co-morbidities. [] Plan just implemented, too soon to assess goals progression <30days   [] Goals require adjustment due to lack of progress  [] Patient is not progressing as expected and requires additional follow up with physician  [] Other    Prognosis for POC: [x] Good [] Fair  [] Poor    Patient requires continued skilled intervention: [x] Yes  [] No      PLAN: Continue per Pt emily. Continue PT 2x/week for 4-6 weeks to include Manual, DN, postural progression, rotator cuff and periscapular strengthening. [x] Continue per plan of care [] Alter current plan (see comments)  [] Plan of care initiated [] Hold pending MD visit [] Discharge    Electronically signed by: Eloi Rodgers, PT     Note: If patient does not return for scheduled/recommended follow up visits, this note will serve as a discharge from care along with the most recent update on progress.

## 2022-02-14 ENCOUNTER — HOSPITAL ENCOUNTER (OUTPATIENT)
Dept: PHYSICAL THERAPY | Age: 47
Setting detail: THERAPIES SERIES
Discharge: HOME | End: 2022-02-14
Payer: COMMERCIAL

## 2022-02-14 NOTE — FLOWSHEET NOTE
[facility]    Physical Therapy  Cancellation/No-show Note  Patient Name:  Lisa Marie  :  1975   Date:  2022  Cancelled visits to date: 2  No-shows to date: 0    For today's appointment patient:  [x]  Cancelled  []  Rescheduled appointment  []  No-show     Reason given by patient:  []  Patient ill  []  Conflicting appointment  []  No transportation    [x]  Conflict with work  []  No reason given  []  Other:     Comments:     Electronically signed by:  Nancy Hope PT

## 2022-02-18 ENCOUNTER — HOSPITAL ENCOUNTER (OUTPATIENT)
Dept: PHYSICAL THERAPY | Age: 47
Setting detail: THERAPIES SERIES
Discharge: HOME | End: 2022-02-18
Payer: COMMERCIAL

## 2022-02-18 PROCEDURE — 97140 MANUAL THERAPY 1/> REGIONS: CPT

## 2022-02-18 PROCEDURE — 97032 APPL MODALITY 1+ESTIM EA 15: CPT

## 2022-02-18 PROCEDURE — 20560 NDL INSJ W/O NJX 1 OR 2 MUSC: CPT

## 2022-02-18 NOTE — FLOWSHEET NOTE
[address] [place] [address]  Phone: [phone] Fax: [phone]      Physical Therapy Treatment Note/ Progress Report:     Date:  2022    Patient Name:  Mendoza Barger    :  1975  MRN: [id number]  Restrictions/Precautions:    Medical/Treatment Diagnosis Information:  · Diagnosis: L shoulder arthroscopy, SAD, DCE, shoulder pain M25. 512  · Treatment Diagnosis: L shoulder arthroscopy, SAD, DCE, shoulder pain M25. 749  Insurance/Certification information:     Physician Information:  Referring Practitioner: Dr Darrius Fuentes of care signed (Y/N):     Date of Patient follow up with Physician:      Progress Report: []  Yes  [x]  No     Date Range for reporting period:  Beginnin21  Endin22     Progress report due (10 Rx/or 30 days whichever is less):        Recertification due (POC duration/ or 90 days whichever is less):   22    Visit # Insurance Allowable Auth Needed   19 40 []Yes    []No     Pain level:  2-3/10     SUBJECTIVE:  Patient had a second MRI for her L shoulder yesterday to rule out RC involvement. Laying flat on her back for the MRI caused significant soreness and pain in the front of her L shoulder and on the inside of her L elbow. L elbow pain has been progressively getting worse lately. Still really struggling to reach over her head and behind her back. Reports that she is still stretching and doing exercises on her own every day. Feels like she has plateaued and is no longer seeing any big changes in her L shoulder ROM. Feels like she is compensating at work and using other muscles, which is causing her L elbow pain.     OBJECTIVE:    Observation:    Test measurements:       ROM Left Right   Shoulder Flex 115 (140) 170   Shoulder Abd 85 (100) 170   Shoulder ER @ 90 (40) 90   Shoulder IR L4 (25) T5                   Strength  Left Right   Shoulder Flex 0.0 lb 8.1 lb   Shoulder Scap 0.0 lb 8.0 lb   Shoulder ER 9.7 lb 15.7 lb   Shoulder IR 14.6 lb 20.0 lb       RESTRICTIONS/PRECAUTIONS: L shoulder arthroscopy 11/5/21. L lower cervical radic    Exercises/Interventions:   Therapeutic Ex (80673) Sets/sec Reps CUES/Notes   UBE 1/2 retro   Cane AAROM flexion - supine 3#   Supine wand ER stretch @ 90    Cross body adduction stretch Low elbow height   Sleeper stretch    IR towel stretch behind back    3 way Isomet   ER/IR   T- band Row/pinch      T- band lower pinch      T- band ER/IR  red   Supine SA punch    SL ER/SL punch Each   Prone Rows/ext      Prone HAB/Prone Flex      Seat Table Slides - abduction    table slides - flexion/abduction    Doorway ER stretch - 60 deg elevation    Seated HH Depression    No Money      Scap Wall Lat touches/wall walks      Half wall lat stretch    Standing flex/scap      Standing Punch      Lawnmower      1st rib self mob          Patient education. 5 min Subscap self pin and stretch w/cane OH flex & wall slides flex         Manual Intervention  (76223)      L GH inferior and posterior glides  Gr. II-III   L posterior capsule stretch - prone, towel roll under anterior shoulder  Gr. II-III   L shoulder PROM - flex, abd, ER     L subscapularis pin and stretch - OH flex/abd   15 min    L sidelying scapular mobs  10 minGr. III-IV   CT junction, T/S mobs/manips  Gr. III-IV, Gr. V   1st rib mobs     STM  UT/levator bilat         NMR Re-education (45456)      T-spine Ext      GH depress/compress      Scap/GH NMR      Body blade      Wall ball roll      Wall Ball bounce      Ball drops      Dionne Scap Bio      Floor Snow angels-sliders            Therapeutic Activity (89411)      UE throwing porgression      Dynamic UE stability      [facility]              Spoke with Dr. Braydon Banda regarding the use of Dry Needling     Dry needling manual therapy: consisted on the placement of 4 needles in the following muscles:  L subscapularis.   A 50 mm needle was inserted, piston, rotated, and coned to produce intramuscular mobilization. These techniques were used to restore functional range of motion, reduce muscle spasm and induce healing in the corresponding musculature. (15180)  Clean Technique was utilized today while applying Dry needling treatment. The treatment sites where cleaned with 70% solution of  isopropyl alcohol . The PT washed their hands and utilized treatment gloves along with hand  prior to inserting the needles. All needles where removed and discarded in the appropriate sharps container. MD has given verbal and/or written approval for this treatment. Attended low frequency (1-20Hz) electrical stimulation was utilized in conjunction with Dry Needling:  the Estim was manipulated between all above needles for a period of 10 min. at 2-4 volts. The low frequency electrical stimulation was used to help reduce muscle spasm and help to interrupt /Bowman the pain cycle. (67153)       Therapeutic Exercise and NMR EXR  [x] (25706) Provided verbal/tactile cueing for activities related to strengthening, flexibility, endurance, ROM  for improvements in scapular, scapulothoracic and UE control with self care, reaching, carrying, lifting, house/yardwork, driving/computer work.    [] (91657) Provided verbal/tactile cueing for activities related to improving balance, coordination, kinesthetic sense, posture, motor skill, proprioception  to assist with  scapular, scapulothoracic and UE control with self care, reaching, carrying, lifting, house/yardwork, driving/computer work. Therapeutic Activities:    [] (96623 or 45642) Provided verbal/tactile cueing for activities related to improving balance, coordination, kinesthetic sense, posture, motor skill, proprioception and motor activation to allow for proper function of scapular, scapulothoracic and UE control with self care, carrying, lifting, driving/computer work.      Home Exercise Program:    [x] (80023) Reviewed/Progressed HEP activities related to strengthening, flexibility, endurance, ROM of scapular, scapulothoracic and UE control with self care, reaching, carrying, lifting, house/yardwork, driving/computer work  [] (27145) Reviewed/Progressed HEP activities related to improving balance, coordination, kinesthetic sense, posture, motor skill, proprioception of scapular, scapulothoracic and UE control with self care, reaching, carrying, lifting, house/yardwork, driving/computer work      Manual Treatments:  PROM / STM / Oscillations-Mobs:  G-I, II, III, IV (PA's, Inf., Post.)  [x] (02981) Provided manual therapy to mobilize soft tissue/joints of cervical/CT, scapular GHJ and UE for the purpose of modulating pain, promoting relaxation,  increasing ROM, reducing/eliminating soft tissue swelling/inflammation/restriction, improving soft tissue extensibility and allowing for proper ROM for normal function with self care, reaching, carrying, lifting, house/yardwork, driving/computer work    Modalities:      Charges:  Timed Code Treatment Minutes: 30   Total Treatment Minutes: 45       [] EVAL (LOW) 71934 (typically 20 minutes face-to-face)  [] EVAL (MOD) 21959 (typically 30 minutes face-to-face)  [] EVAL (HIGH) 74312 (typically 45 minutes face-to-face)  [] RE-EVAL     [] QF(22159) x   [x] DRY NEEDLE 1 OR 2 MUSCLES  [] NMR (03958) x     [] DRY NEEDLE 3+ MUSCLES  [x] Manual (54612) x 2      [] TA (44230) x     [] Mech Traction (16191)  [x] ES(attended) (11816)     [] ES (un) (31 213103):   [] VASO (99893)  [] Other:     GOALS:  Patient stated goal: work pain free  [x] Progressing: [] Met: [] Not Met: [] Adjusted    Therapist goals for Patient:   Short Term Goals: To be achieved in: 2 weeks  1. Independent in HEP and progression per patient tolerance, in order to prevent re-injury. [] Progressing: [x] Met: [] Not Met: [] Adjusted  2.  Patient will have a decrease in pain to facilitate improvement in movement, function, and ADLs as indicated by Functional Deficits. [x] Progressing: [] Met: [] Not Met: [] Adjusted    Long Term Goals: To be achieved in: 10 weeks  1. Disability index score of 10% or less for the Quick DASH to assist with reaching prior level of function. [x] Progressing: [] Met: [] Not Met: [] Adjusted  2. Patient will demonstrate increased AROM to [facility] to allow for proper joint functioning as indicated by Functional Deficits. [x] Progressing: [] Met: [] Not Met: [] Adjusted  3. Patient will demonstrate an increase in NM recruitment/activation and overall GH and scapular strength to within n5lbs HHD or WNL for proper functional mobility as indicated by patients Functional Deficits. [x] Progressing: [] Met: [] Not Met: [] Adjusted  4. Patient will return to working out without pain  activities without increased symptoms or restriction. [x] Progressing: [] Met: [] Not Met: [] Adjusted  5. No pain with sleeping(patient specific functional goal)     [x] Progressing: [] Met: [] Not Met: [] Adjusted    ASSESSMENT:  Patient still really limited with active L shoulder flexion, abduction, and IR behind back. Reports significant anterior L shoulder achiness/soreness and increasing pain in L medial elbow. L subscap mobility very restricted particularly when in OH ranges. Focus of today's session on freeing up subscapularis via manual techniques and DN to improve OH mobility to decrease load and relieve discomfort in anterior L shoulder and medial elbow. Noted improved L shoulder active flexion and abduction at session conclusion with decreased pain. Educated patient on performance of self subscap pin and stretch with OH cane flexion and wall slides to improve carryover in mobility and pain between sessions.      Return to Play: (if applicable)    []  Stage 1: Intro to Strength   []  Stage 2: Dynamic Strength and Intro to Plyometrics   []  Stage 3: Advanced Plyometrics and Intro to Throwing   []  Stage 4: Sport

## 2022-02-22 ENCOUNTER — HOSPITAL ENCOUNTER (OUTPATIENT)
Dept: PHYSICAL THERAPY | Age: 47
Setting detail: THERAPIES SERIES
Discharge: HOME | End: 2022-02-22
Payer: COMMERCIAL

## 2022-02-22 PROCEDURE — 20560 NDL INSJ W/O NJX 1 OR 2 MUSC: CPT

## 2022-02-22 PROCEDURE — 97140 MANUAL THERAPY 1/> REGIONS: CPT

## 2022-02-22 PROCEDURE — 97032 APPL MODALITY 1+ESTIM EA 15: CPT

## 2022-02-22 NOTE — FLOWSHEET NOTE
[place] [address] [address]  Phone: [phone] Fax: [phone]      Physical Therapy Treatment Note/ Progress Report:     Date:  2022    Patient Name:  Josemanuel Tellez    :  1975  MRN: [id number]  Restrictions/Precautions:    Medical/Treatment Diagnosis Information:  · Diagnosis: L shoulder arthroscopy, SAD, DCE, shoulder pain M25. 512  · Treatment Diagnosis: L shoulder arthroscopy, SAD, DCE, shoulder pain M25. 182  Insurance/Certification information:     Physician Information:  Referring Practitioner: Dr Aaliyah Moon of care signed (Y/N):     Date of Patient follow up with Physician:      Progress Report: []  Yes  [x]  No     Date Range for reporting period:  Beginnin21  Endin22     Progress report due (10 Rx/or 30 days whichever is less):        Recertification due (POC duration/ or 90 days whichever is less):   22    Visit # Insurance Allowable Auth Needed   21   (11 in ) 40 []Yes    []No     Pain level:  2-3/10 ache in anterior L shoulder     SUBJECTIVE:  L shoulder felt like it was less tight and stiff and she felt like she could move it better after previous session, but that really only lasted until that evening. Patient woke up the next morning and mobility felt like it was back to the way it was. She was also sore again in the front of her L shoulder. No elbow pain though. Hasn't seemed to have as much elbow pain over the past couple days. Patient is scheduled for a L shoulder manipulation on .      OBJECTIVE:    Observation:    Test measurements:       ROM Left Right   Shoulder Flex 115 (140) 170   Shoulder Abd 85 (100) 170   Shoulder ER @ 90 (40) 90   Shoulder IR L4 (25) T5                   Strength  Left Right   Shoulder Flex 0.0 lb 8.1 lb   Shoulder Scap 0.0 lb 8.0 lb   Shoulder ER 9.7 lb 15.7 lb   Shoulder IR 14.6 lb 20.0 lb       RESTRICTIONS/PRECAUTIONS: L shoulder arthroscopy 11/5/21. L lower cervical radic    Exercises/Interventions:   Therapeutic Ex (72596) Sets/sec Reps CUES/Notes   UBE 1/2 retro   Cane AAROM flexion - supine 3#   Supine wand ER stretch @ 90    Cross body adduction stretch Low elbow height   Sleeper stretch    IR towel stretch behind back    3 way Isomet   ER/IR   T- band Row/pinch      T- band lower pinch      T- band ER/IR  red   Supine SA punch    SL ER/SL punch Each   Prone Rows/ext      Prone HAB/Prone Flex      Seat Table Slides - abduction    table slides - flexion/abduction    Doorway ER stretch - 60 deg elevation    Seated HH Depression    No Money      Scap Wall Lat touches/wall walks      Half wall lat stretch    Standing flex/scap      Standing Punch      Lawnmower      1st rib self mob                      Manual Intervention  (44924)      L GH inferior and posterior glides  Gr. II-III   L posterior capsule stretch - prone, towel roll under anterior shoulder  Gr. II-III   L shoulder PROM - flex, abd, ER  10 min    L subscapularis pin and stretch - OH flex/abd   15 min    L sidelying scapular mobs  10 minGr. III-IV   CT junction, T/S mobs/manips  Gr. III-IV, Gr. V   1st rib mobs     STM  UT/levator bilat         NMR Re-education (29740)      T-spine Ext      GH depress/compress      Scap/GH NMR      Body blade      Wall ball roll      Wall Ball bounce      Ball drops      Dionne Scap Bio      Floor Snow angels-sliders            Therapeutic Activity (94566)      UE throwing porgression      Dynamic UE stability      [address] [facility]              Spoke with Dr. Baron Caldwell regarding the use of Dry Needling     Dry needling manual therapy: consisted on the placement of 6 needles in the following muscles:  L subscapularis, latissimus dorsi, rhomboids/medial scapular border. A 50 mm needle was inserted, piston, rotated, and coned to produce intramuscular mobilization.   These techniques were used to restore functional range of motion, reduce muscle spasm and induce healing in the corresponding musculature. (58215)  Clean Technique was utilized today while applying Dry needling treatment. The treatment sites where cleaned with 70% solution of  isopropyl alcohol . The PT washed their hands and utilized treatment gloves along with hand  prior to inserting the needles. All needles where removed and discarded in the appropriate sharps container. MD has given verbal and/or written approval for this treatment. Attended low frequency (1-20Hz) electrical stimulation was utilized in conjunction with Dry Needling:  the Estim was manipulated between all above needles for a period of 10 min. at 2-4 volts. The low frequency electrical stimulation was used to help reduce muscle spasm and help to interrupt /[place] the pain cycle. (39932)       Therapeutic Exercise and NMR EXR  [x] (79809) Provided verbal/tactile cueing for activities related to strengthening, flexibility, endurance, ROM  for improvements in scapular, scapulothoracic and UE control with self care, reaching, carrying, lifting, house/yardwork, driving/computer work.    [] (15976) Provided verbal/tactile cueing for activities related to improving balance, coordination, kinesthetic sense, posture, motor skill, proprioception  to assist with  scapular, scapulothoracic and UE control with self care, reaching, carrying, lifting, house/yardwork, driving/computer work. Therapeutic Activities:    [] (53397 or 80773) Provided verbal/tactile cueing for activities related to improving balance, coordination, kinesthetic sense, posture, motor skill, proprioception and motor activation to allow for proper function of scapular, scapulothoracic and UE control with self care, carrying, lifting, driving/computer work.      Home Exercise Program:    [x] (82628) Reviewed/Progressed HEP activities related to strengthening, flexibility, endurance, ROM of scapular, scapulothoracic and UE control with self care, reaching, carrying, lifting, house/yardwork, driving/computer work  [] (36819) Reviewed/Progressed HEP activities related to improving balance, coordination, kinesthetic sense, posture, motor skill, proprioception of scapular, scapulothoracic and UE control with self care, reaching, carrying, lifting, house/yardwork, driving/computer work      Manual Treatments:  PROM / STM / Oscillations-Mobs:  G-I, II, III, IV (PA's, Inf., Post.)  [x] (00547) Provided manual therapy to mobilize soft tissue/joints of cervical/CT, scapular GHJ and UE for the purpose of modulating pain, promoting relaxation,  increasing ROM, reducing/eliminating soft tissue swelling/inflammation/restriction, improving soft tissue extensibility and allowing for proper ROM for normal function with self care, reaching, carrying, lifting, house/yardwork, driving/computer work    Modalities:      Charges:  Timed Code Treatment Minutes: 35   Total Treatment Minutes: 45       [] EVAL (LOW) 08498 (typically 20 minutes face-to-face)  [] EVAL (MOD) 56312 (typically 30 minutes face-to-face)  [] EVAL (HIGH) [id number] (typically 45 minutes face-to-face)  [] RE-EVAL     [] LP(63293) x   [x] DRY NEEDLE 1 OR 2 MUSCLES  [] NMR (13455) x     [] DRY NEEDLE 3+ MUSCLES  [x] Manual (34862) x 2      [] TA (02318) x     [] Mech Traction (09022)  [x] ES(attended) (70191)     [] ES (un) (82806):   [] VASO (70016)  [] Other:     GOALS:  Patient stated goal: work pain free  [x] Progressing: [] Met: [] Not Met: [] Adjusted    Therapist goals for Patient:   Short Term Goals: To be achieved in: 2 weeks  1. Independent in HEP and progression per patient tolerance, in order to prevent re-injury. [] Progressing: [x] Met: [] Not Met: [] Adjusted  2. Patient will have a decrease in pain to facilitate improvement in movement, function, and ADLs as indicated by Functional Deficits. [x] Progressing: [] Met: [] Not Met: [] Adjusted    Long Term Goals:  To be achieved Stages   []  Not Ready for Return to Sports   Comments:      Treatment/Activity Tolerance:  [x] Patient tolerated treatment well [] Patient limited by fatique  [] Patient limited by pain  [] Patient limited by other medical complications  [] Other:     Overall Progression Towards Functional goals/ Treatment Progress Update:  [] Patient is progressing as expected towards functional goals listed. [x] Progression is slowed due to complexities/Impairments listed. - presenting like potential frozen shoulder resulting from fall after surgery  [] Progression has been slowed due to co-morbidities. [] Plan just implemented, too soon to assess goals progression <30days   [] Goals require adjustment due to lack of progress  [] Patient is not progressing as expected and requires additional follow up with physician  [] Other    Prognosis for POC: [x] Good [] Fair  [] Poor    Patient requires continued skilled intervention: [x] Yes  [] No      PLAN: Continue per Pt emily. Continue PT 1-2x/week for 4-6 weeks to include Manual, DN, postural progression, rotator cuff and periscapular strengthening. [x] Continue per plan of care [] Alter current plan (see comments)  [] Plan of care initiated [] Hold pending MD visit [] Discharge    Electronically signed by: Socorro Aranda, PT, DPT, MS, SCS     Note: If patient does not return for scheduled/recommended follow up visits, this note will serve as a discharge from care along with the most recent update on progress.

## 2022-02-25 ENCOUNTER — APPOINTMENT (OUTPATIENT)
Dept: PHYSICAL THERAPY | Age: 47
End: 2022-02-25
Payer: COMMERCIAL

## 2022-03-04 ENCOUNTER — HOSPITAL ENCOUNTER (OUTPATIENT)
Dept: PHYSICAL THERAPY | Age: 47
Setting detail: THERAPIES SERIES
Discharge: HOME | End: 2022-03-04
Payer: COMMERCIAL

## 2022-03-04 PROCEDURE — 97140 MANUAL THERAPY 1/> REGIONS: CPT

## 2022-03-04 NOTE — FLOWSHEET NOTE
[place] [address] [address]  Phone: [phone] Fax: [phone]      Physical Therapy Treatment Note/ Progress Report:     Date:  3/4/2022    Patient Name:  Milagros Reese    :  1975  MRN: [id number]  Restrictions/Precautions:    Medical/Treatment Diagnosis Information:  · Diagnosis: L shoulder arthroscopy, SAD, DCE, shoulder pain M25. 512  · Treatment Diagnosis: L shoulder arthroscopy, SAD, DCE, shoulder pain M25. 423  Insurance/Certification information:     Physician Information:  Referring Practitioner: Dr Stella Claire  care signed (Y/N):     Date of Patient follow up with Physician:      Progress Report: []  Yes  [x]  No     Date Range for reporting period:  Beginnin21  Endin22     Progress report due (10 Rx/or 30 days whichever is less):    9/41/15    Recertification due (POC duration/ or 90 days whichever is less):   22    Visit # Insurance Allowable Auth Needed   22  (12 in ) 40 []Yes    []No     Pain level:  2-3/10 ache in anterior L shoulder     SUBJECTIVE: Reports minimal change in ability to reach up over her head since last session. Still having soreness in the front of the L shoulder on and off. Hasn't seemed to have as much elbow pain over the past or so. Patient is scheduled for a L shoulder manipulation on . OBJECTIVE:    Observation:    Test measurements:       ROM Left Right   Shoulder Flex 115 (140) 170   Shoulder Abd 85 (100) 170   Shoulder ER @ 90 (40) 90   Shoulder IR L4 (25) T5                   Strength  Left Right   Shoulder Flex 0.0 lb 8.1 lb   Shoulder Scap 0.0 lb 8.0 lb   Shoulder ER 9.7 lb 15.7 lb   Shoulder IR 14.6 lb 20.0 lb       RESTRICTIONS/PRECAUTIONS: L shoulder arthroscopy 21.  L lower cervical radic    Exercises/Interventions:   Therapeutic Ex (16567) Sets/sec Reps CUES/Notes   UBE 1/2 retro   Cane AAROM flexion - supine 3#   Supine wand ER stretch @ 90    Cross body adduction stretch Low elbow height   Sleeper stretch    IR towel stretch behind back    3 way Isomet   ER/IR   T- band Row/pinch      T- band lower pinch      T- band ER/IR  red   Supine SA punch    SL ER/SL punch Each   Prone Rows/ext      Prone HAB/Prone Flex      Seat Table Slides - abduction    table slides - flexion/abduction    Doorway ER stretch - low elevation 20s 8 Unable to feel the stretch in posterior capsule with higher elevation   Seated HH Depression    No Money      Scap Wall Lat touches/wall walks      Half wall lat stretch    Standing flex/scap      Standing Punch      Lawnmower      1st rib self mob                      Manual Intervention  (47215)      L GH inferior and posterior glides  3 min Gr. II-III   L posterior capsule stretch - prone, towel roll under anterior shoulder  7 min Gr. II-III   L shoulder PROM - flex, abd, ER  10 min    L subscapularis pin and stretch - OH flex/abd   15 min    L sidelying scapular mobs  5 minGr.  III-IV   CT junction, T/S mobs/manips  Gr. III-IV, Gr. V   1st rib mobs     STM  UT/levator bilat         NMR Re-education (65499)      T-spine Ext      GH depress/compress      Scap/GH NMR      Body blade      Wall ball roll      Wall Ball bounce      Ball drops      Dionne Scap Bio      Floor Snow angels-sliders            Therapeutic Activity (30594)      UE throwing porgression      Dynamic UE stability      Earthquake Bar      Bodyblade                    Therapeutic Exercise and NMR EXR  [x] (06845) Provided verbal/tactile cueing for activities related to strengthening, flexibility, endurance, ROM  for improvements in scapular, scapulothoracic and UE control with self care, reaching, carrying, lifting, house/yardwork, driving/computer work.    [] (17252) Provided verbal/tactile cueing for activities related to improving balance, coordination, kinesthetic sense, posture, motor skill, proprioception  to assist with  scapular, scapulothoracic and UE control with self care, reaching, carrying, lifting, house/yardwork, driving/computer work. Therapeutic Activities:    [] (22066 or 06341) Provided verbal/tactile cueing for activities related to improving balance, coordination, kinesthetic sense, posture, motor skill, proprioception and motor activation to allow for proper function of scapular, scapulothoracic and UE control with self care, carrying, lifting, driving/computer work.      Home Exercise Program:    [x] (54226) Reviewed/Progressed HEP activities related to strengthening, flexibility, endurance, ROM of scapular, scapulothoracic and UE control with self care, reaching, carrying, lifting, house/yardwork, driving/computer work  [] (57996) Reviewed/Progressed HEP activities related to improving balance, coordination, kinesthetic sense, posture, motor skill, proprioception of scapular, scapulothoracic and UE control with self care, reaching, carrying, lifting, house/yardwork, driving/computer work      Manual Treatments:  PROM / STM / Oscillations-Mobs:  G-I, II, III, IV (PA's, Inf., Post.)  [x] (91920) Provided manual therapy to mobilize soft tissue/joints of cervical/CT, scapular GHJ and UE for the purpose of modulating pain, promoting relaxation,  increasing ROM, reducing/eliminating soft tissue swelling/inflammation/restriction, improving soft tissue extensibility and allowing for proper ROM for normal function with self care, reaching, carrying, lifting, house/yardwork, driving/computer work    Modalities:      Charges:  Timed Code Treatment Minutes: 45   Total Treatment Minutes: 45       [] EVAL (LOW) 82609 (typically 20 minutes face-to-face)  [] EVAL (MOD) 85726 (typically 30 minutes face-to-face)  [] EVAL (HIGH) 31314 (typically 45 minutes face-to-face)  [] RE-EVAL     [] YZ(94182) x   [] DRY NEEDLE 1 OR 2 MUSCLES  [] NMR (82791) x     [] DRY NEEDLE 3+ MUSCLES  [x] Manual (50047) x 3      [] TA (30285) x     [] Mech Traction (18594)  [] ES(attended) (84398)     [] ES (un) (43603):   [] VASO (95147)  [] Other:     GOALS:  Patient stated goal: work pain free  [x] Progressing: [] Met: [] Not Met: [] Adjusted    Therapist goals for Patient:   Short Term Goals: To be achieved in: 2 weeks  1. Independent in HEP and progression per patient tolerance, in order to prevent re-injury. [] Progressing: [x] Met: [] Not Met: [] Adjusted  2. Patient will have a decrease in pain to facilitate improvement in movement, function, and ADLs as indicated by Functional Deficits. [x] Progressing: [] Met: [] Not Met: [] Adjusted    Long Term Goals: To be achieved in: 10 weeks  1. Disability index score of 10% or less for the Quick DASH to assist with reaching prior level of function. [x] Progressing: [] Met: [] Not Met: [] Adjusted  2. Patient will demonstrate increased AROM to [facility] to allow for proper joint functioning as indicated by Functional Deficits. [x] Progressing: [] Met: [] Not Met: [] Adjusted  3. Patient will demonstrate an increase in NM recruitment/activation and overall GH and scapular strength to within n5lbs HHD or WNL for proper functional mobility as indicated by patients Functional Deficits. [x] Progressing: [] Met: [] Not Met: [] Adjusted  4. Patient will return to working out without pain  activities without increased symptoms or restriction. [x] Progressing: [] Met: [] Not Met: [] Adjusted  5. No pain with sleeping(patient specific functional goal)     [x] Progressing: [] Met: [] Not Met: [] Adjusted    ASSESSMENT:  Patient still really limited with active L shoulder flexion, abduction, and IR behind back. Does not feel any major lasting change in L shoulder mobility following PT sessions in spite of seeing improvements in OH mobility at conclusion following aggressive manual techniques. Continued focus of session on addressing posterior capsule mobility restrictions via aggressive manual techniques and self stretching.  Patient unable to feel the stretch in posterior capsule with elevated positions during doorway ER stretch. Was able to feel some posterior stretch with low elbow elevation. Encouraged patient to start with this position and really try to stretch at least 3x per day to see if this helps her maintain any OH mobility. Patient scheduled for L shoulder manipulation on March 25th. Return to Play: (if applicable)    []  Stage 1: Intro to Strength   []  Stage 2: Dynamic Strength and Intro to Plyometrics   []  Stage 3: Advanced Plyometrics and Intro to Throwing   []  Stage 4: Sport specific Training/Return to Sport     []  Ready to Return to Play, [email or web address]   []  Not Ready for Return to Sports   Comments:      Treatment/Activity Tolerance:  [x] Patient tolerated treatment well [] Patient limited by fatique  [] Patient limited by pain  [] Patient limited by other medical complications  [] Other:     Overall Progression Towards Functional goals/ Treatment Progress Update:  [] Patient is progressing as expected towards functional goals listed. [x] Progression is slowed due to complexities/Impairments listed. - presenting like potential frozen shoulder resulting from fall after surgery  [] Progression has been slowed due to co-morbidities. [] Plan just implemented, too soon to assess goals progression <30days   [] Goals require adjustment due to lack of progress  [] Patient is not progressing as expected and requires additional follow up with physician  [] Other    Prognosis for POC: [x] Good [] Fair  [] Poor    Patient requires continued skilled intervention: [x] Yes  [] No      PLAN: Continue per Pt emily. Continue PT 1-2x/week for 4-6 weeks to include Manual, DN, postural progression, rotator cuff and periscapular strengthening.    [x] Continue per plan of care [] Alter current plan (see comments)  [] Plan of care initiated [] Hold pending MD visit [] Discharge    Electronically signed by: Jerlene Barthel, PT, DPT, MS, SCS     Note: If patient does not return for scheduled/recommended follow up visits, this note will serve as a discharge from care along with the most recent update on progress.

## 2022-03-11 ENCOUNTER — APPOINTMENT (OUTPATIENT)
Dept: PHYSICAL THERAPY | Age: 47
End: 2022-03-11
Payer: COMMERCIAL

## 2022-03-17 ENCOUNTER — HOSPITAL ENCOUNTER (OUTPATIENT)
Dept: PHYSICAL THERAPY | Age: 47
Setting detail: THERAPIES SERIES
Discharge: HOME | End: 2022-03-17
Payer: COMMERCIAL

## 2022-03-17 PROCEDURE — 97140 MANUAL THERAPY 1/> REGIONS: CPT

## 2022-03-17 NOTE — FLOWSHEET NOTE
[place] [address] [address]  Phone: [phone] Fax: [phone]      Physical Therapy Treatment Note/ Progress Report:     Date:  3/17/2022    Patient Name:  Milagros Reese    :  1975  MRN: [id number]  Restrictions/Precautions:    Medical/Treatment Diagnosis Information:  · Diagnosis: L shoulder arthroscopy, SAD, DCE, shoulder pain M25. 512  · Treatment Diagnosis: L shoulder arthroscopy, SAD, DCE, shoulder pain M25. 547  Insurance/Certification information:     Physician Information:  Referring Practitioner: Dr Stella Claire  care signed (Y/N):     Date of Patient follow up with Physician:      Progress Report: []  Yes  [x]  No     Date Range for reporting period:  Beginnin21  Endin22     Progress report due (10 Rx/or 30 days whichever is less):    3/19/36    Recertification due (POC duration/ or 90 days whichever is less):   22    Visit # Insurance Allowable Auth Needed   23  (12 in ) 40 []Yes    []No     Pain level:  2-3/10 ache in anterior L shoulder     SUBJECTIVE: Pt states that her shoulder is flared up and tight, which is usually the worst after 2 consecutive days of work like she has just had. Last 2 fingers are numb this morning. Feeling tight and sore through front and back of shoulder, into the upper traps and down into the upper arm some. Pt has manipulation procedure scheduled for next Friday. OBJECTIVE:    Observation:    Test measurements:       ROM Left Right   Shoulder Flex 115 (140) 170   Shoulder Abd 85 (100) 170   Shoulder ER @ 90 (40) 90   Shoulder IR L4 (25) T5                   Strength  Left Right   Shoulder Flex 0.0 lb 8.1 lb   Shoulder Scap 0.0 lb 8.0 lb   Shoulder ER 9.7 lb 15.7 lb   Shoulder IR 14.6 lb 20.0 lb       RESTRICTIONS/PRECAUTIONS: L shoulder arthroscopy 21.  L lower cervical radic    Exercises/Interventions:   Therapeutic Ex (25056) Sets/sec Reps CUES/Notes   UBE 1/2 retro   Cane AAROM flexion - supine 3#   Supine wand ER stretch @ 90    Cross body adduction stretch Low elbow height   Sleeper stretch    IR towel stretch behind back    3 way Isomet   ER/IR   T- band Row/pinch      T- band lower pinch      T- band ER/IR  red   Supine SA punch    SL ER/SL punch Each   Prone Rows/ext      Prone HAB/Prone Flex      Seat Table Slides - abduction    table slides - flexion/abduction    Doorway ER stretch - low elevation 20s 8 Unable to feel the stretch in posterior capsule with higher elevation   Seated HH Depression    No Money      Scap Wall Lat touches/wall walks      Half wall lat stretch    Standing flex/scap      Standing Punch      Lawnmower      1st rib self mob                      Manual Intervention  (58840)      L GH inferior and posterior glides  3 min Gr. II-III   L posterior capsule stretch - prone, towel roll under anterior shoulder   min Gr. II-III   L shoulder PROM - flex, abd, ER  10 min    L subscapularis pin and stretch - OH flex/abd   6 min    L sidelying scapular mobs  5 minGr.  III-IV   CT junction, T/S mobs/manips  Gr. III-IV, Gr. V   1st rib mobs  5 min    STM  10 min UT/levator bilat         NMR Re-education (85229)      T-spine Ext      GH depress/compress      Scap/GH NMR      Body blade      Wall ball roll      Wall Ball bounce      Ball drops      Dionne Scap Bio      Floor Snow angels-sliders            Therapeutic Activity (11200)      UE throwing porgression      Dynamic UE stability      Earthquake Bar      Bodyblade                    Therapeutic Exercise and NMR EXR  [x] (46099) Provided verbal/tactile cueing for activities related to strengthening, flexibility, endurance, ROM  for improvements in scapular, scapulothoracic and UE control with self care, reaching, carrying, lifting, house/yardwork, driving/computer work.    [] (48893) Provided verbal/tactile cueing for activities related to improving balance, coordination, kinesthetic sense, posture, motor skill, proprioception  to assist with  scapular, scapulothoracic and UE control with self care, reaching, carrying, lifting, house/yardwork, driving/computer work. Therapeutic Activities:    [] (28299 or 93669) Provided verbal/tactile cueing for activities related to improving balance, coordination, kinesthetic sense, posture, motor skill, proprioception and motor activation to allow for proper function of scapular, scapulothoracic and UE control with self care, carrying, lifting, driving/computer work.      Home Exercise Program:    [x] (41577) Reviewed/Progressed HEP activities related to strengthening, flexibility, endurance, ROM of scapular, scapulothoracic and UE control with self care, reaching, carrying, lifting, house/yardwork, driving/computer work  [] (67495) Reviewed/Progressed HEP activities related to improving balance, coordination, kinesthetic sense, posture, motor skill, proprioception of scapular, scapulothoracic and UE control with self care, reaching, carrying, lifting, house/yardwork, driving/computer work      Manual Treatments:  PROM / STM / Oscillations-Mobs:  G-I, II, III, IV (PA's, Inf., Post.)  [x] (40768) Provided manual therapy to mobilize soft tissue/joints of cervical/CT, scapular GHJ and UE for the purpose of modulating pain, promoting relaxation,  increasing ROM, reducing/eliminating soft tissue swelling/inflammation/restriction, improving soft tissue extensibility and allowing for proper ROM for normal function with self care, reaching, carrying, lifting, house/yardwork, driving/computer work    Modalities:  none    Charges:  Timed Code Treatment Minutes: 40   Total Treatment Minutes: 43       [] EVAL (LOW) 85025 (typically 20 minutes face-to-face)  [] EVAL (MOD) 86366 (typically 30 minutes face-to-face)  [] EVAL (HIGH) 46498 (typically 45 minutes face-to-face)  [] RE-EVAL     [] W[place](48600) x   [] DRY NEEDLE 1 OR 2 MUSCLES  [] NMR (58090) x     [] DRY NEEDLE 3+ MUSCLES  [x] Manual (63648) x 3      [] TA (21237) x     [] Mech Traction (15466)  [] ES(attended) (05896)     [] ES (un) (97762):   [] VASO (56238)  [] Other:     GOALS:  Patient stated goal: work pain free  [x] Progressing: [] Met: [] Not Met: [] Adjusted    Therapist goals for Patient:   Short Term Goals: To be achieved in: 2 weeks  1. Independent in HEP and progression per patient tolerance, in order to prevent re-injury. [] Progressing: [x] Met: [] Not Met: [] Adjusted  2. Patient will have a decrease in pain to facilitate improvement in movement, function, and ADLs as indicated by Functional Deficits. [x] Progressing: [] Met: [] Not Met: [] Adjusted    Long Term Goals: To be achieved in: 10 weeks  1. Disability index score of 10% or less for the Quick DASH to assist with reaching prior level of function. [x] Progressing: [] Met: [] Not Met: [] Adjusted  2. Patient will demonstrate increased AROM to [facility] to allow for proper joint functioning as indicated by Functional Deficits. [x] Progressing: [] Met: [] Not Met: [] Adjusted  3. Patient will demonstrate an increase in NM recruitment/activation and overall GH and scapular strength to within n5lbs HHD or WNL for proper functional mobility as indicated by patients Functional Deficits. [x] Progressing: [] Met: [] Not Met: [] Adjusted  4. Patient will return to working out without pain  activities without increased symptoms or restriction. [x] Progressing: [] Met: [] Not Met: [] Adjusted  5. No pain with sleeping(patient specific functional goal)     [x] Progressing: [] Met: [] Not Met: [] Adjusted    ASSESSMENT:  Patient still really limited with active L shoulder flexion, abduction, and IR behind back. Does not feel any major lasting change in L shoulder mobility following PT sessions in spite of seeing improvements in OH mobility at conclusion following aggressive manual techniques.  Continued focus of session on addressing posterior capsule mobility restrictions via aggressive manual techniques and self stretching. Patient unable to feel the stretch in posterior capsule with elevated positions during doorway ER stretch. Was able to feel some posterior stretch with low elbow elevation. Encouraged patient to start with this position and really try to stretch at least 3x per day to see if this helps her maintain any OH mobility. Patient scheduled for L shoulder manipulation on March 25th. Return to Play: (if applicable)    []  Stage 1: Intro to Strength   []  Stage 2: Dynamic Strength and Intro to Plyometrics   []  Stage 3: Advanced Plyometrics and Intro to Throwing   []  Stage 4: Sport specific Training/Return to Sport     []  Ready to Return to Play, [employer]   []  Not Ready for Return to Sports   Comments:      Treatment/Activity Tolerance:  [x] Patient tolerated treatment well [] Patient limited by fatique  [] Patient limited by pain  [] Patient limited by other medical complications  [] Other:     Overall Progression Towards Functional goals/ Treatment Progress Update:  [] Patient is progressing as expected towards functional goals listed. [x] Progression is slowed due to complexities/Impairments listed. - presenting like potential frozen shoulder resulting from fall after surgery  [] Progression has been slowed due to co-morbidities. [] Plan just implemented, too soon to assess goals progression <30days   [] Goals require adjustment due to lack of progress  [] Patient is not progressing as expected and requires additional follow up with physician  [] Other    Prognosis for POC: [x] Good [] Fair  [] Poor    Patient requires continued skilled intervention: [x] Yes  [] No      PLAN: Continue per Pt emily. Continue PT 1-2x/week for 4-6 weeks to include Manual, DN, postural progression, rotator cuff and periscapular strengthening.    [x] Continue per plan of care [] Alter current plan (see comments)  [] Plan of care initiated [] Hold pending MD visit [] Discharge    Electronically signed by: Charisma Grissom, PTA [id number]    Note: If patient does not return for scheduled/recommended follow up visits, this note will serve as a discharge from care along with the most recent update on progress.

## 2022-03-18 ENCOUNTER — APPOINTMENT (OUTPATIENT)
Dept: PHYSICAL THERAPY | Age: 47
End: 2022-03-18
Payer: COMMERCIAL

## 2022-03-25 ENCOUNTER — HOSPITAL ENCOUNTER (OUTPATIENT)
Dept: PHYSICAL THERAPY | Age: 47
Setting detail: THERAPIES SERIES
Discharge: HOME | End: 2022-03-25
Payer: COMMERCIAL

## 2022-03-25 PROCEDURE — 97164 PT RE-EVAL EST PLAN CARE: CPT

## 2022-03-25 PROCEDURE — 97016 VASOPNEUMATIC DEVICE THERAPY: CPT

## 2022-03-25 PROCEDURE — 97140 MANUAL THERAPY 1/> REGIONS: CPT

## 2022-03-25 PROCEDURE — 97110 THERAPEUTIC EXERCISES: CPT

## 2022-03-25 NOTE — PLAN OF CARE
[address]  Phone: [phone] Fax: [phone]      Physical Therapy Re-Certification Plan of Care/MD UPDATE      Dear Dr. Jeremy Mars,    We had the pleasure of treating the following patient for physical therapy services at [address] [place] [address]. A summary of our findings can be found in the updated assessment below. This includes our plan of care. If you have any questions or concerns regarding these findings, please do not hesitate to contact me at the office phone number checked above. Thank you for the referral.     Physician Signature:________________________________Date:__________________  By signing above (or electronic signature), therapists plan is approved by physician    Date Range Of Visits: 22 to 3/25/22  Total Visits to Date: 21 (15 in )  Overall Response to Treatment:   []Patient is responding well to treatment and improvement is noted with regards  to goals   []Patient should continue to improve in reasonable time if they continue HEP   []Patient has plateaued and is no longer responding to skilled PT intervention    []Patient is getting worse and would benefit from return to referring MD   []Patient unable to adhere to initial POC   [x]Other: L shoulder scope with scar tissue debridement on 3/25/22.     Physical Therapy Treatment Note/ Progress Report:     Date:  3/25/2022    Patient Name:  Harry Snider    :  1975  MRN: [id number]  Restrictions/Precautions:    Medical/Treatment Diagnosis Information:  · Diagnosis: L shoulder arthroscopy, SAD, DCE, shoulder pain M25. 512  · Treatment Diagnosis: L shoulder arthroscopy, SAD, DCE, shoulder pain M25. 221  Insurance/Certification information:     Physician Information:  Referring Practitioner: Dr Robert Stallworth of care signed (Y/N):     Date of Patient follow up with Physician:      Progress Report: [x]  Yes  [] No     Date Range for reporting period:  Beginnin21  Endin22     Progress report due (10 Rx/or 30 days whichever is less):    29    Recertification due (POC duration/ or 90 days whichever is less):   22    Visit # Insurance Allowable Auth Needed   24  (12 in ) 40 []Yes    []No     Pain level:  0/10 at start of session - patient still experiencing effects of nerve block    SUBJECTIVE: Patient had a L shoulder scope with scar tissue debridement this morning. OBJECTIVE: 3/25/22   Observation:    Test measurements:       ROM Left (passive)  Post-op day 1 Right   Shoulder Flex 160 170   Shoulder Abd 155 170   Shoulder ER @ 90 75 90   Shoulder IR @ 90 65 T5                   Strength  Left Right   Shoulder Flex  8.1 lb   Shoulder Scap  8.0 lb   Shoulder ER  15.7 lb   Shoulder IR  20.0 lb       RESTRICTIONS/PRECAUTIONS: L shoulder arthroscopy 21. L shoulder scope with scar tissue debridement 3/25/22. Exercises/Interventions:   Therapeutic Ex (34188) Sets/sec Reps CUES/Notes   UBE 4 min1/2 retro   AAROM flexion - supine 1 10 R UE assist   Supine cane AA flexion - supine - varying angle  nv    Supine wand ER stretch - varying angle  nv    Table Slides - flexion/abduction 1 10 R UE assist   Subscap/lat stretch - hand on mat, backing up for OH flexion stretch 5s 10    Wall slides flexion/abduction  nv    IR towel stretch behind back  nv    Doorway ER stretch - low elevation  nv          Patient education.   8 min Findings, purpose, focus, goals, expectations of PT; Updated HEP         Manual Intervention  (53803)      L GH inferior and posterior glides  5 min Gr. II-III   L shoulder PROM - flex, abd, ER/IR  15 min                                      NMR Re-education (24521)      T-spine Ext      GH depress/compress      Scap/GH NMR      Body blade      Wall ball roll      Wall Ball bounce      Ball drops      Dionne Scap Bio      Floor Snow angels-sliders            Therapeutic Activity (33220)      UE throwing porgression      Dynamic UE stability      Earthquake Bar      Bodyblade                    Therapeutic Exercise and NMR EXR  [x] (17892) Provided verbal/tactile cueing for activities related to strengthening, flexibility, endurance, ROM  for improvements in scapular, scapulothoracic and UE control with self care, reaching, carrying, lifting, house/yardwork, driving/computer work.    [] (70168) Provided verbal/tactile cueing for activities related to improving balance, coordination, kinesthetic sense, posture, motor skill, proprioception  to assist with  scapular, scapulothoracic and UE control with self care, reaching, carrying, lifting, house/yardwork, driving/computer work. Therapeutic Activities:    [] (16909 or 86969) Provided verbal/tactile cueing for activities related to improving balance, coordination, kinesthetic sense, posture, motor skill, proprioception and motor activation to allow for proper function of scapular, scapulothoracic and UE control with self care, carrying, lifting, driving/computer work.      Home Exercise Program:    [x] (87850) Reviewed/Progressed HEP activities related to strengthening, flexibility, endurance, ROM of scapular, scapulothoracic and UE control with self care, reaching, carrying, lifting, house/yardwork, driving/computer work  [] (92370) Reviewed/Progressed HEP activities related to improving balance, coordination, kinesthetic sense, posture, motor skill, proprioception of scapular, scapulothoracic and UE control with self care, reaching, carrying, lifting, house/yardwork, driving/computer work      Manual Treatments:  PROM / STM / Oscillations-Mobs:  G-I, II, III, IV (PA's, Inf., Post.)  [x] (42954) Provided manual therapy to mobilize soft tissue/joints of cervical/CT, scapular GHJ and UE for the purpose of modulating pain, promoting relaxation,  increasing ROM, reducing/eliminating soft tissue swelling/inflammation/restriction, improving soft tissue extensibility and allowing for proper ROM for normal function with self care, reaching, carrying, lifting, house/yardwork, driving/computer work    Modalities:  Game Ready to L shoulder for pain/inflammation/edema - 15 minutes. Charges:  Timed Code Treatment Minutes: 30   Total Treatment Minutes: 60       [] EVAL (LOW) 47583 (typically 20 minutes face-to-face)  [] EVAL (MOD) 96713 (typically 30 minutes face-to-face)  [] EVAL (HIGH) 94420 (typically 45 minutes face-to-face)  [x] RE-EVAL     [x] ZN(63590) x 1  [] DRY NEEDLE 1 OR 2 MUSCLES  [] NMR (22357) x     [] DRY NEEDLE 3+ MUSCLES  [x] Manual (71729) x 1      [] TA (70147) x     [] Mech Traction (33082)  [] ES(attended) (16816)     [] ES (un) (87203):   [x] VASO (64744)  [] Other:     GOALS:  Patient stated goal: work pain free  [x] Progressing: [] Met: [] Not Met: [] Adjusted    Therapist goals for Patient:   Short Term Goals: To be achieved in: 2 weeks  1. Independent in HEP and progression per patient tolerance, in order to prevent re-injury. [] Progressing: [x] Met: [] Not Met: [] Adjusted  2. Patient will have a decrease in pain to facilitate improvement in movement, function, and ADLs as indicated by Functional Deficits. [x] Progressing: [] Met: [] Not Met: [] Adjusted    Long Term Goals: To be achieved in: 10 weeks  1. Disability index score of 10% or less for the Quick DASH to assist with reaching prior level of function. [x] Progressing: [] Met: [] Not Met: [] Adjusted  2. Patient will demonstrate increased AROM to [facility] to allow for proper joint functioning as indicated by Functional Deficits. [x] Progressing: [] Met: [] Not Met: [] Adjusted  3. Patient will demonstrate an increase in NM recruitment/activation and overall GH and scapular strength to within n5lbs HHD or WNL for proper functional mobility as indicated by patients Functional Deficits. [x] Progressing: [] Met: [] Not Met: [] Adjusted  4.  Patient will return to working out without pain  activities without increased symptoms or restriction. [x] Progressing: [] Met: [] Not Met: [] Adjusted  5. No pain with sleeping(patient specific functional goal)     [x] Progressing: [] Met: [] Not Met: [] Adjusted    ASSESSMENT:  Patient had L shoulder scope for scar tissue debridement this morning prior to session. Nerve block is still present at start of session. Focus of session on manual PROM and stretching with assistance from R UE to ensure maintenance of ROM. Updated HEP to continue to encourage mobility and ROM outside of PT to be performed 3-5x per day. Patient to be seen 3x per week for at least 2 weeks. Return to Play: (if applicable)    []  Stage 1: Intro to Strength   []  Stage 2: Dynamic Strength and Intro to Plyometrics   []  Stage 3: Advanced Plyometrics and Intro to Throwing   []  Stage 4: Sport specific Training/Return to Sport     []  Ready to Return to Play, [employer]   []  Not Ready for Return to Sports   Comments:      Treatment/Activity Tolerance:  [x] Patient tolerated treatment well [] Patient limited by fatique  [] Patient limited by pain  [] Patient limited by other medical complications  [] Other:     Overall Progression Towards Functional goals/ Treatment Progress Update:  [] Patient is progressing as expected towards functional goals listed. [x] Progression is slowed due to complexities/Impairments listed. - presenting like potential frozen shoulder resulting from fall after surgery  [] Progression has been slowed due to co-morbidities.   [] Plan just implemented, too soon to assess goals progression <30days   [] Goals require adjustment due to lack of progress  [] Patient is not progressing as expected and requires additional follow up with physician  [] Other    Prognosis for POC: [x] Good [] Fair  [] Poor    Patient requires continued skilled intervention: [x] Yes  [] No      PLAN: Continue PT 3x/week for 2 weeks  [] Continue per plan of care [x] Alter current plan (see comments)  [] Plan of care initiated [] Hold pending MD visit [] Discharge    Electronically signed by: Jarred High, PT, DPT, MS, SCS    Note: If patient does not return for scheduled/recommended follow up visits, this note will serve as a discharge from care along with the most recent update on progress.

## 2022-03-28 ENCOUNTER — HOSPITAL ENCOUNTER (OUTPATIENT)
Dept: PHYSICAL THERAPY | Age: 47
Setting detail: THERAPIES SERIES
Discharge: HOME | End: 2022-03-28
Payer: COMMERCIAL

## 2022-03-28 PROCEDURE — 97110 THERAPEUTIC EXERCISES: CPT

## 2022-03-28 PROCEDURE — 97140 MANUAL THERAPY 1/> REGIONS: CPT

## 2022-03-28 PROCEDURE — 97016 VASOPNEUMATIC DEVICE THERAPY: CPT

## 2022-03-28 NOTE — FLOWSHEET NOTE
[address] [place] [address]  Phone: [phone] Fax: [phone]        Physical Therapy Treatment Note/ Progress Report:     Date:  3/28/2022    Patient Name:  Erika Ortiz    :  1975  MRN: [id number]  Restrictions/Precautions:    Medical/Treatment Diagnosis Information:  · Diagnosis: L shoulder arthroscopy, SAD, DCE, shoulder pain M25. 512  · Treatment Diagnosis: L shoulder arthroscopy, SAD, DCE, shoulder pain M25. 926  Insurance/Certification information:     Physician Information:  Referring Practitioner: Dr Odalys Childress of care signed (Y/N):     Date of Patient follow up with Physician:      Progress Report: []  Yes  [x]  No     Date Range for reporting period:  Beginnin21  Endin22     Progress report due (10 Rx/or 30 days whichever is less):        Recertification due (POC duration/ or 90 days whichever is less):   22    Visit # Insurance Allowable Auth Needed   25  (12 in ) 40 []Yes    []No     Pain level:  0/10 at start of session - patient still experiencing effects of nerve block    SUBJECTIVE: Patient had a L shoulder scope with scar tissue debridement this morning. OBJECTIVE: 3/25/22   Observation:    Test measurements:       ROM Left (passive)  Post-op day 1 Right   Shoulder Flex 160 170   Shoulder Abd 155 170   Shoulder ER @ 90 75 90   Shoulder IR @ 90 65 T5                   Strength  Left Right   Shoulder Flex  8.1 lb   Shoulder Scap  8.0 lb   Shoulder ER  15.7 lb   Shoulder IR  20.0 lb       RESTRICTIONS/PRECAUTIONS: L shoulder arthroscopy 21. L shoulder scope with scar tissue debridement 3/25/22.     Exercises/Interventions:   Therapeutic Ex (31644) - 20 Sets/sec Reps CUES/Notes   UBE 4 min1/2 retro   AAROM flexion - supine 1 10 R UE assist   Supine cane AA flexion - supine - varying angle 1 10    Supine wand ER stretch - varying angle 1 10    Table Slides - flexion/abduction 1 10 R UE assist   Subscap/lat stretch - hand on mat, backing up for OH flexion stretch 5s 10    Wall slides flexion/abduction 1 10    IR towel stretch behind back  nv    Doorway ER stretch - low elevation  nv                      Manual Intervention  (33797) - 25      L GH inferior and posterior glides  5 min Gr. II-III   L shoulder PROM - flex, abd, ER/IR  15 min                                      NMR Re-education (40221)      T-spine Ext      GH depress/compress      Scap/GH NMR      Body blade      Wall ball roll      Wall Ball bounce      Ball drops      Dionne Scap Bio      Floor Snow angels-sliders            Therapeutic Activity (54829)      UE throwing porgression      Dynamic UE stability      Earthquake Bar      Bodyblade                    Therapeutic Exercise and NMR EXR  [x] (85571) Provided verbal/tactile cueing for activities related to strengthening, flexibility, endurance, ROM  for improvements in scapular, scapulothoracic and UE control with self care, reaching, carrying, lifting, house/yardwork, driving/computer work.    [] (18280) Provided verbal/tactile cueing for activities related to improving balance, coordination, kinesthetic sense, posture, motor skill, proprioception  to assist with  scapular, scapulothoracic and UE control with self care, reaching, carrying, lifting, house/yardwork, driving/computer work. Therapeutic Activities:    [] (66751 or 30473) Provided verbal/tactile cueing for activities related to improving balance, coordination, kinesthetic sense, posture, motor skill, proprioception and motor activation to allow for proper function of scapular, scapulothoracic and UE control with self care, carrying, lifting, driving/computer work.      Home Exercise Program:    [x] (17077) Reviewed/Progressed HEP activities related to strengthening, flexibility, endurance, ROM of scapular, scapulothoracic and UE control with self care, reaching, carrying, lifting, house/yardwork, driving/computer work  [] (60402) Reviewed/Progressed HEP activities related to improving balance, coordination, kinesthetic sense, posture, motor skill, proprioception of scapular, scapulothoracic and UE control with self care, reaching, carrying, lifting, house/yardwork, driving/computer work      Manual Treatments:  PROM / STM / Oscillations-Mobs:  G-I, II, III, IV (PA's, Inf., Post.)  [x] (85862) Provided manual therapy to mobilize soft tissue/joints of cervical/CT, scapular GHJ and UE for the purpose of modulating pain, promoting relaxation,  increasing ROM, reducing/eliminating soft tissue swelling/inflammation/restriction, improving soft tissue extensibility and allowing for proper ROM for normal function with self care, reaching, carrying, lifting, house/yardwork, driving/computer work    Modalities:  Game Ready to L shoulder for pain/inflammation/edema - 15 minutes. Charges:  Timed Code Treatment Minutes: 43   Total Treatment Minutes: 58       [] EVAL (LOW) 23614 (typically 20 minutes face-to-face)  [] EVAL (MOD) 75387 (typically 30 minutes face-to-face)  [] EVAL (HIGH) 01619 (typically 45 minutes face-to-face)  [] RE-EVAL     [x] UR(79229) x 1  [] DRY NEEDLE 1 OR 2 MUSCLES  [] NMR (93065) x     [] DRY NEEDLE 3+ MUSCLES  [x] Manual (20965) x 2      [] TA (14298) x     [] Mech Traction (21579)  [] ES(attended) (79789)     [] ES (un) (98983):   [x] VASO (13850)  [] Other:     GOALS:  Patient stated goal: work pain free  [x] Progressing: [] Met: [] Not Met: [] Adjusted    Therapist goals for Patient:   Short Term Goals: To be achieved in: 2 weeks  1. Independent in HEP and progression per patient tolerance, in order to prevent re-injury. [] Progressing: [x] Met: [] Not Met: [] Adjusted  2. Patient will have a decrease in pain to facilitate improvement in movement, function, and ADLs as indicated by Functional Deficits.   [x] Progressing: [] Met: [] Not Met: [] Adjusted    Long Term Goals: To be achieved in: 10 weeks  1. Disability index score of 10% or less for the Quick DASH to assist with reaching prior level of function. [x] Progressing: [] Met: [] Not Met: [] Adjusted  2. Patient will demonstrate [facility] to allow for proper joint functioning as indicated by Functional Deficits. [x] Progressing: [] Met: [] Not Met: [] Adjusted  3. Patient will demonstrate an increase in NM recruitment/activation and overall GH and scapular strength to within n5lbs HHD or WNL for proper functional mobility as indicated by patients Functional Deficits. [x] Progressing: [] Met: [] Not Met: [] Adjusted  4. Patient will return to working out without pain  activities without increased symptoms or restriction. [x] Progressing: [] Met: [] Not Met: [] Adjusted  5. No pain with sleeping(patient specific functional goal)     [x] Progressing: [] Met: [] Not Met: [] Adjusted    ASSESSMENT:   Focus of session on manual PROM and stretching with assistance from R UE to ensure maintenance of ROM, main limitation into ER today w/ report of tightness through back of shoulder. Progressed passive and AAROM as tolerated. . Patient to be seen 3x per week for at least 2 weeks. Return to Play: (if applicable)    []  Stage 1: Intro to Strength   []  Stage 2: Dynamic Strength and Intro to Plyometrics   []  Stage 3: Advanced Plyometrics and Intro to Throwing   []  Stage 4: Sport specific Training/Return to Sport     []  Ready to Return to Play, [employer]   []  Not Ready for Return to Sports   Comments:      Treatment/Activity Tolerance:  [x] Patient tolerated treatment well [] Patient limited by fatique  [] Patient limited by pain  [] Patient limited by other medical complications  [] Other:     Overall Progression Towards Functional goals/ Treatment Progress Update:  [] Patient is progressing as expected towards functional goals listed.     [x] Progression is slowed due to complexities/Impairments listed. - presenting like potential frozen shoulder resulting from fall after surgery  [] Progression has been slowed due to co-morbidities. [] Plan just implemented, too soon to assess goals progression <30days   [] Goals require adjustment due to lack of progress  [] Patient is not progressing as expected and requires additional follow up with physician  [] Other    Prognosis for POC: [x] Good [] Fair  [] Poor    Patient requires continued skilled intervention: [x] Yes  [] No      PLAN: Continue PT 3x/week for 2 weeks  [] Continue per plan of care [x] Alter current plan (see comments)  [] Plan of care initiated [] Hold pending MD visit [] Discharge    Electronically signed by: Emmanuel Treviño, PT, DPT, MS, SCS    Note: If patient does not return for scheduled/recommended follow up visits, this note will serve as a discharge from care along with the most recent update on progress.

## 2022-03-29 ENCOUNTER — HOSPITAL ENCOUNTER (OUTPATIENT)
Dept: PHYSICAL THERAPY | Age: 47
Setting detail: THERAPIES SERIES
Discharge: HOME | End: 2022-03-29
Payer: COMMERCIAL

## 2022-03-29 PROCEDURE — 97016 VASOPNEUMATIC DEVICE THERAPY: CPT

## 2022-03-29 PROCEDURE — 97140 MANUAL THERAPY 1/> REGIONS: CPT

## 2022-03-29 PROCEDURE — 97110 THERAPEUTIC EXERCISES: CPT

## 2022-03-30 ENCOUNTER — HOSPITAL ENCOUNTER (OUTPATIENT)
Dept: PHYSICAL THERAPY | Age: 47
Setting detail: THERAPIES SERIES
Discharge: HOME | End: 2022-03-30
Payer: COMMERCIAL

## 2022-03-30 PROCEDURE — 97110 THERAPEUTIC EXERCISES: CPT

## 2022-03-30 PROCEDURE — 97016 VASOPNEUMATIC DEVICE THERAPY: CPT

## 2022-03-30 PROCEDURE — 97140 MANUAL THERAPY 1/> REGIONS: CPT

## 2022-03-30 NOTE — FLOWSHEET NOTE
[facility] [address] [facility]  Phone: [phone] Fax: [phone]        Physical Therapy Treatment Note/ Progress Report:     Date:  3/30/2022    Patient Name:  Deisi Puente    :  1975  MRN: [id number]  Restrictions/Precautions:    Medical/Treatment Diagnosis Information:  · Diagnosis: L shoulder arthroscopy, SAD, DCE, shoulder pain M25. 512  · Treatment Diagnosis: L shoulder arthroscopy, SAD, DCE, shoulder pain M25. 257  Insurance/Certification information:     Physician Information:  Referring Practitioner: Dr Eduardo Coello of care signed (Y/N):     Date of Patient follow up with Physician:      Progress Report: []  Yes  [x]  No     Date Range for reporting period:  Beginnin21  Endin22     Progress report due (10 Rx/or 30 days whichever is less):        Recertification due (POC duration/ or 90 days whichever is less):   22    Visit # Insurance Allowable Auth Needed   27  (13 in ) 40 []Yes    []No     Pain level:  2-3/10 soreness, achiness in L shoulder at start of session     SUBJECTIVE: States L shoulder feels very sore today from increased work load today unexpectedly. OBJECTIVE: 3/25/22   Observation:    Test measurements:       ROM Left (passive)  Post-op day 1 Right   Shoulder Flex 160 170   Shoulder Abd 155 170   Shoulder ER @ 90 75 90   Shoulder IR @ 90 65 T5                   Strength  Left Right   Shoulder Flex  8.1 lb   Shoulder Scap  8.0 lb   Shoulder ER  15.7 lb   Shoulder IR  20.0 lb       RESTRICTIONS/PRECAUTIONS: L shoulder arthroscopy 21. L shoulder scope with scar tissue debridement 3/25/22.     Exercises/Interventions:   Therapeutic Ex (83824) -10 Sets/sec Reps CUES/Notes   UBE 4 min1/2 retro   Supine cane AA flexion - supine - varying angle 1 10    Supine wand ER stretch - varying angle 1 10    Table Slides - flexion/abduction  1 10 R UE assist   Subscap/lat stretch - hand on mat, backing up for OH flexion stretch 5s 10    Wall slides flexion/abduction 1 10    IR towel stretch behind back 10s 10    Doorway ER stretch - 60 deg elevation 10s 10    Supine SA punch - 3# cane 2 10    Active shoulder flexion  2 10 Staying within comfortable range                     Manual Intervention  (52031)       L GH inferior and posterior glides  5 min Gr. II-III   L shoulder PROM - flex, abd, ER/IR  20 min                                      NMR Re-education (00439)      T-spine Ext      GH depress/compress      Scap/GH NMR      Body blade      Wall ball roll      Wall Ball bounce      Ball drops      Dionne Scap Bio      Floor Snow angels-sliders            Therapeutic Activity (02810)      UE throwing porgression      Dynamic UE stability      Earthquake Bar      Bodyblade                    Therapeutic Exercise and NMR EXR  [x] (79578) Provided verbal/tactile cueing for activities related to strengthening, flexibility, endurance, ROM  for improvements in scapular, scapulothoracic and UE control with self care, reaching, carrying, lifting, house/yardwork, driving/computer work.    [] (85705) Provided verbal/tactile cueing for activities related to improving balance, coordination, kinesthetic sense, posture, motor skill, proprioception  to assist with  scapular, scapulothoracic and UE control with self care, reaching, carrying, lifting, house/yardwork, driving/computer work. Therapeutic Activities:    [] (16095 or 40645) Provided verbal/tactile cueing for activities related to improving balance, coordination, kinesthetic sense, posture, motor skill, proprioception and motor activation to allow for proper function of scapular, scapulothoracic and UE control with self care, carrying, lifting, driving/computer work.      Home Exercise Program:    [x] (19024) Reviewed/Progressed HEP activities related to strengthening, flexibility, endurance, ROM of scapular, scapulothoracic and UE control with self care, reaching, carrying, lifting, house/yardwork, driving/computer work  [] (17201) Reviewed/Progressed HEP activities related to improving balance, coordination, kinesthetic sense, posture, motor skill, proprioception of scapular, scapulothoracic and UE control with self care, reaching, carrying, lifting, house/yardwork, driving/computer work      Manual Treatments:  PROM / STM / Oscillations-Mobs:  G-I, II, III, IV (PA's, Inf., Post.)  [x] (75577) Provided manual therapy to mobilize soft tissue/joints of cervical/CT, scapular GHJ and UE for the purpose of modulating pain, promoting relaxation,  increasing ROM, reducing/eliminating soft tissue swelling/inflammation/restriction, improving soft tissue extensibility and allowing for proper ROM for normal function with self care, reaching, carrying, lifting, house/yardwork, driving/computer work    Modalities:  Game Ready to L shoulder for pain/inflammation/edema - 15 minutes. Charges:  Timed Code Treatment Minutes: 35   Total Treatment Minutes: 45       [] EVAL (LOW) 84429 (typically 20 minutes face-to-face)  [] EVAL (MOD) 14230 (typically 30 minutes face-to-face)  [] EVAL (HIGH) 13076 (typically 45 minutes face-to-face)  [] RE-EVAL     [x] LB(81244) x 1  [] DRY NEEDLE 1 OR 2 MUSCLES  [] NMR (00788) x     [] DRY NEEDLE 3+ MUSCLES  [x] Manual (31471) x 1      [] TA (02648) x     [] Mech Traction (50167)  [] ES(attended) (91353)     [] ES (un) (49488):   [x] VASO (08656)  [] Other:     GOALS:  Patient stated goal: work pain free  [x] Progressing: [] Met: [] Not Met: [] Adjusted    Therapist goals for Patient:   Short Term Goals: To be achieved in: 2 weeks  1. Independent in HEP and progression per patient tolerance, in order to prevent re-injury. [] Progressing: [x] Met: [] Not Met: [] Adjusted  2. Patient will have a decrease in pain to facilitate improvement in movement, function, and ADLs as indicated by Functional Deficits.   [x] Progressing: [] Met: [] Not Met: [] Adjusted    Long Term Goals: To be achieved in: 10 weeks  1. Disability index score of 10% or less for the Quick DASH to assist with reaching prior level of function. [x] Progressing: [] Met: [] Not Met: [] Adjusted  2. Patient will demonstrate increased AROM to [facility] to allow for proper joint functioning as indicated by Functional Deficits. [x] Progressing: [] Met: [] Not Met: [] Adjusted  3. Patient will demonstrate an increase in NM recruitment/activation and overall GH and scapular strength to within n5lbs HHD or WNL for proper functional mobility as indicated by patients Functional Deficits. [x] Progressing: [] Met: [] Not Met: [] Adjusted  4. Patient will return to working out without pain  activities without increased symptoms or restriction. [x] Progressing: [] Met: [] Not Met: [] Adjusted  5. No pain with sleeping(patient specific functional goal)     [x] Progressing: [] Met: [] Not Met: [] Adjusted    ASSESSMENT:   Focused session on PROM and AAROM to tolerance as pt had significant increased soreness through cuff today. Encouraged pt to do HEP tonight as tolerated. Some tightness limitiations through IR and ER. Patient definitely needs strength progression as she is very weak in available range now that she is getting close to full mobility and is maintaining it well. Patient to be seen 3x per week for at least 2 weeks.     Return to Play: (if applicable)    []  Stage 1: Intro to Strength   []  Stage 2: Dynamic Strength and Intro to Plyometrics   []  Stage 3: Advanced Plyometrics and Intro to Throwing   []  Stage 4: Sport specific Training/Return to Sport     []  Ready to Return to Play, [employer]   []  Not Ready for Return to Sports   Comments:      Treatment/Activity Tolerance:  [x] Patient tolerated treatment well [] Patient limited by fatique  [] Patient limited by pain  [] Patient limited by other medical complications  [] Other:     Overall Progression Towards Functional goals/ Treatment Progress Update:  [] Patient is progressing as expected towards functional goals listed. [x] Progression is slowed due to complexities/Impairments listed. - presenting like potential frozen shoulder resulting from fall after surgery  [] Progression has been slowed due to co-morbidities. [] Plan just implemented, too soon to assess goals progression <30days   [] Goals require adjustment due to lack of progress  [] Patient is not progressing as expected and requires additional follow up with physician  [] Other    Prognosis for POC: [x] Good [] Fair  [] Poor    Patient requires continued skilled intervention: [x] Yes  [] No      PLAN: Continue PT 3x/week for 2 weeks  [x] Continue per plan of care [x] Alter current plan (see comments)  [] Plan of care initiated [] Hold pending MD visit [] Discharge    Electronically signed by: Emmanuel Treviño PT    Note: If patient does not return for scheduled/recommended follow up visits, this note will serve as a discharge from care along with the most recent update on progress.

## 2022-04-01 ENCOUNTER — HOSPITAL ENCOUNTER (OUTPATIENT)
Dept: PHYSICAL THERAPY | Age: 47
Setting detail: THERAPIES SERIES
Discharge: HOME | End: 2022-04-01
Payer: COMMERCIAL

## 2022-04-01 PROCEDURE — 97016 VASOPNEUMATIC DEVICE THERAPY: CPT

## 2022-04-01 PROCEDURE — 97140 MANUAL THERAPY 1/> REGIONS: CPT

## 2022-04-01 PROCEDURE — 97110 THERAPEUTIC EXERCISES: CPT

## 2022-04-01 NOTE — FLOWSHEET NOTE
[place] [address] [address]  Phone: [phone] Fax: [phone]        Physical Therapy Treatment Note/ Progress Report:     Date:  2022    Patient Name:  Jeffry Kiser    :  1975  MRN: [id number]  Restrictions/Precautions:    Medical/Treatment Diagnosis Information:  · Diagnosis: L shoulder arthroscopy, SAD, DCE, shoulder pain M25. 512  · Treatment Diagnosis: L shoulder arthroscopy, SAD, DCE, shoulder pain M25. 152  Insurance/Certification information:     Physician Information:  Referring Practitioner: Dr Georgi Stevens [facility] signed (Y/N):     Date of Patient follow up with Physician:      Progress Report: []  Yes  [x]  No     Date Range for reporting period:  Beginnin21  Endin22     Progress report due (10 Rx/or 30 days whichever is less):        Recertification due (POC duration/ or 90 days whichever is less):   22    Visit # Insurance Allowable Auth Needed   28  (14 in ) 40 []Yes    []No     Pain level:  2-3/10 soreness, achiness in L shoulder at start of session     SUBJECTIVE: Feels tight and sore like there is a knot on the outside of her L shoulder around her deltoid and in her bicep down closer to her elbow. 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 OBJECTIVE: 3/25/22   Observation:    Test measurements:       ROM Left (passive)  Post-op day 1 Right   Shoulder Flex 160 170   Shoulder Abd 155 170   Shoulder ER @ 90 75 90   Shoulder IR @ 90 65 T5                   Strength  Left Right   Shoulder Flex  8.1 lb   Shoulder Scap  8.0 lb   Shoulder ER  15.7 lb   Shoulder IR  20.0 lb       RESTRICTIONS/PRECAUTIONS: L shoulder arthroscopy 21. L shoulder scope with scar tissue debridement 3/25/22.     Exercises/Interventions:   Therapeutic Ex (67583) -10 Sets/sec Reps CUES/Notes UBE 4 min1/2 retro   Supine cane AA flexion - supine - varying angle 1 10    Supine wand ER stretch - varying angle 1 10    Table Slides - flexion/abduction  1 10 R UE assist   Subscap/lat stretch - hand on mat, backing up for OH flexion stretch 5s 10    Wall slides flexion/abduction 1 10    IR towel stretch behind back 10s 10    Doorway ER stretch - 60 deg elevation 10s 10    Supine SA punch - 5# cane 2 10    Active shoulder flexion  2 10 Staying within comfortable range   Sidelying shoulder ER 2 10    Sidelying shoulder abd to 90deg 2 10                      Manual Intervention  (69354)       L GH inferior and posterior glides  5 min Gr. II-III   L shoulder PROM - flex, abd, ER/IR  20 min                                      NMR Re-education (57402)      T-spine Ext      GH depress/compress      Scap/GH NMR      Body blade      Wall ball roll      Wall Ball bounce      Ball drops      Dionne Scap Bio      Floor Snow angels-sliders            Therapeutic Activity (25716)      UE throwing porgression      Dynamic UE stability      Earthquake Bar      Bodyblade                  Access Code: [id number]  URL: [email or web address]  Date: 04/01/2022  Prepared by: Melissa Fatima    Exercises  Supine Shoulder Protraction with Dowel - 1-2 x daily - 7 x weekly - 2-3 sets - 10 reps  Supine Shoulder Flexion Extension Full Range AROM - 1-2 x daily - 7 x weekly - 2-3 sets - 10 reps  Sidelying Shoulder External Rotation - 1-2 x daily - 7 x weekly - 2-3 sets - 10 reps  Sidelying Shoulder Abduction Palm Forward - 1-2 x daily - 7 x weekly - 2-3 sets - 10 reps      Therapeutic Exercise and NMR EXR  [x] (28303) Provided verbal/tactile cueing for activities related to strengthening, flexibility, endurance, ROM  for improvements in scapular, scapulothoracic and UE control with self care, reaching, carrying, lifting, house/yardwork, driving/computer work.    [] (72903) Provided verbal/tactile cueing for activities related to improving balance, coordination, kinesthetic sense, posture, motor skill, proprioception  to assist with  scapular, scapulothoracic and UE control with self care, reaching, carrying, lifting, house/yardwork, driving/computer work. Therapeutic Activities:    [] (08781 or 87813) Provided verbal/tactile cueing for activities related to improving balance, coordination, kinesthetic sense, posture, motor skill, proprioception and motor activation to allow for proper function of scapular, scapulothoracic and UE control with self care, carrying, lifting, driving/computer work. Home Exercise Program:    [x] (14814) Reviewed/Progressed HEP activities related to strengthening, flexibility, endurance, ROM of scapular, scapulothoracic and UE control with self care, reaching, carrying, lifting, house/yardwork, driving/computer work  [] (19912) Reviewed/Progressed HEP activities related to improving balance, coordination, kinesthetic sense, posture, motor skill, proprioception of scapular, scapulothoracic and UE control with self care, reaching, carrying, lifting, house/yardwork, driving/computer work      Manual Treatments:  PROM / STM / Oscillations-Mobs:  G-I, II, III, IV (PA's, Inf., Post.)  [x] (89482) Provided manual therapy to mobilize soft tissue/joints of cervical/CT, scapular GHJ and UE for the purpose of modulating pain, promoting relaxation,  increasing ROM, reducing/eliminating soft tissue swelling/inflammation/restriction, improving soft tissue extensibility and allowing for proper ROM for normal function with self care, reaching, carrying, lifting, house/yardwork, driving/computer work    Modalities:  Game Ready to L shoulder for pain/inflammation/edema - 10 minutes.      Charges:  Timed Code Treatment Minutes: 50   Total Treatment Minutes: 60       [] EVAL (LOW) 84234 (typically 20 minutes face-to-face)  [] EVAL (MOD) 20033 (typically 30 minutes face-to-face)  [] EVAL (HIGH) 41828 (typically 45 minutes face-to-face)  [] RE-EVAL     [x] XE(82134) x 2  [] DRY NEEDLE 1 OR 2 MUSCLES  [] NMR (68496) x     [] DRY NEEDLE 3+ MUSCLES  [x] Manual (70301) x 1      [] TA (07836) x     [] Mech Traction (38346)  [] ES(attended) (40094)     [] ES (un) (89177):   [x] VASO (62301)  [] Other:     GOALS:  Patient stated goal: work pain free  [x] Progressing: [] Met: [] Not Met: [] Adjusted    Therapist goals for Patient:   Short Term Goals: To be achieved in: 2 weeks  1. Independent in HEP and progression per patient tolerance, in order to prevent re-injury. [] Progressing: [x] Met: [] Not Met: [] Adjusted  2. Patient will have a decrease in pain to facilitate improvement in movement, function, and ADLs as indicated by Functional Deficits. [x] Progressing: [] Met: [] Not Met: [] Adjusted    Long Term Goals: To be achieved in: 10 weeks  1. Disability index score of 10% or less for the Quick DASH to assist with reaching prior level of function. [x] Progressing: [] Met: [] Not Met: [] Adjusted  2. Patient will demonstrate increased AROM to [facility] to allow for proper joint functioning as indicated by Functional Deficits. [x] Progressing: [] Met: [] Not Met: [] Adjusted  3. Patient will demonstrate an increase in NM recruitment/activation and overall GH and scapular strength to within n5lbs HHD or WNL for proper functional mobility as indicated by patients Functional Deficits. [x] Progressing: [] Met: [] Not Met: [] Adjusted  4. Patient will return to working out without pain  activities without increased symptoms or restriction. [x] Progressing: [] Met: [] Not Met: [] Adjusted  5. No pain with sleeping(patient specific functional goal)     [x] Progressing: [] Met: [] Not Met: [] Adjusted    ASSESSMENT:   Continued all manual techniques and stretching to continued maintenance and progression of L shoulder ROM/mobility.  Progressed strength tasks today as she is very weak in available range likely contributing to some of patient's complaints of soreness, especially towards the end of the day after work activities. Patient POC to decrease down to 2x per week starting next week per physician recommendations. Return to Play: (if applicable)    []  Stage 1: Intro to Strength   []  Stage 2: Dynamic Strength and Intro to Plyometrics   []  Stage 3: Advanced Plyometrics and Intro to Throwing   []  Stage 4: Sport specific Training/Return to Sport     []  Ready to Return to Play, [employer]   []  Not Ready for Return to Sports   Comments:      Treatment/Activity Tolerance:  [x] Patient tolerated treatment well [] Patient limited by fatique  [] Patient limited by pain  [] Patient limited by other medical complications  [] Other:     Overall Progression Towards Functional goals/ Treatment Progress Update:  [] Patient is progressing as expected towards functional goals listed. [x] Progression is slowed due to complexities/Impairments listed. - presenting like potential frozen shoulder resulting from fall after surgery  [] Progression has been slowed due to co-morbidities. [] Plan just implemented, too soon to assess goals progression <30days   [] Goals require adjustment due to lack of progress  [] Patient is not progressing as expected and requires additional follow up with physician  [] Other    Prognosis for POC: [x] Good [] Fair  [] Poor    Patient requires continued skilled intervention: [x] Yes  [] No      PLAN: Continue PT 2x/week for 4 weeks  [x] Continue per plan of care [x] Alter current plan (see comments)  [] Plan of care initiated [] Hold pending MD visit [] Discharge    Electronically signed by: Geneva Meeks, PT, DPT, MS, SCS    Note: If patient does not return for scheduled/recommended follow up visits, this note will serve as a discharge from care along with the most recent update on progress.

## 2022-04-04 ENCOUNTER — HOSPITAL ENCOUNTER (OUTPATIENT)
Dept: PHYSICAL THERAPY | Age: 47
Setting detail: THERAPIES SERIES
Discharge: HOME | End: 2022-04-04
Payer: COMMERCIAL

## 2022-04-04 PROCEDURE — 97110 THERAPEUTIC EXERCISES: CPT

## 2022-04-04 PROCEDURE — 97140 MANUAL THERAPY 1/> REGIONS: CPT

## 2022-04-05 NOTE — FLOWSHEET NOTE
[place] [address]  Phone: [phone] Fax: [phone]        Physical Therapy Treatment Note/ Progress Report:     Date:  2022    Patient Name:  Deepak Herr    :  1975  MRN: [id number]  Restrictions/Precautions:    Medical/Treatment Diagnosis Information:  · Diagnosis: L shoulder arthroscopy, SAD, DCE, shoulder pain M25. 512  · Treatment Diagnosis: L shoulder arthroscopy, SAD, DCE, shoulder pain M25. 345  Insurance/Certification information:     Physician Information:  Referring Practitioner: Dr Brandon Guadalupe of care signed (Y/N):     Date of Patient follow up with Physician:      Progress Report: []  Yes  [x]  No     Date Range for reporting period:  Beginnin21  Endin22     Progress report due (10 Rx/or 30 days whichever is less):    3/81/19    Recertification due (POC duration/ or 90 days whichever is less):   22    Visit # Insurance Allowable Auth Needed   29  (14 in ) 40 []Yes    []No     Pain level:  2-3/10 soreness, achiness in L shoulder at start of session     SUBJECTIVE: States she was pretty sore over the weekend and today due to increased workload with coworkers being out sick. Has tomorrow off. OBJECTIVE: 3/25/22   Observation:    Test measurements:       ROM Left (passive)  Post-op day 1 Right   Shoulder Flex 160 170   Shoulder Abd 155 170   Shoulder ER @ 90 75 90   Shoulder IR @ 90 65 T5                   Strength  Left Right   Shoulder Flex  8.1 lb   Shoulder Scap  8.0 lb   Shoulder ER  15.7 lb   Shoulder IR  20.0 lb       RESTRICTIONS/PRECAUTIONS: L shoulder arthroscopy 21. L shoulder scope with scar tissue debridement 3/25/22.     Exercises/Interventions:   Therapeutic Ex (87342) -10 Sets/sec Reps CUES/Notes   UBE 4 min1/2 retro   Supine cane AA flexion - supine - varying angle 1 10    Supine wand ER stretch - varying angle 1 10    Table Slides - flexion/abduction  1 10 R UE assist   Subscap/lat stretch - hand on mat, backing up for OH flexion stretch 5s 10    Wall slides flexion/abduction 1 10    IR towel stretch behind back 10s 10    Doorway ER stretch - 60 deg elevation 10s 10    Supine SA punch - 5# cane      Active shoulder flexion    Staying within comfortable range   Sidelying shoulder ER      Sidelying shoulder abd to 90deg                        Manual Intervention  (53281)       L GH inferior and posterior glides  5 min Gr. II-III   L shoulder PROM - flex, abd, ER/IR  20 min Added subscap hold w/ OH flexion                                     NMR Re-education (76031)      T-spine Ext      GH depress/compress      Scap/GH NMR      Body blade      Wall ball roll      Wall Ball bounce      Ball drops      Dionne Scap Bio      Floor Snow angels-sliders            Therapeutic Activity (73258)      UE throwing porgression      Dynamic UE stability      Earthquake Bar      Bodyblade                  Access Code: [id number]  URL: [email or web address]  Date: 04/01/2022  Prepared by: Terry Yap    Exercises  Supine Shoulder Protraction with Dowel - 1-2 x daily - 7 x weekly - 2-3 sets - 10 reps  Supine Shoulder Flexion Extension Full Range AROM - 1-2 x daily - 7 x weekly - 2-3 sets - 10 reps  Sidelying Shoulder External Rotation - 1-2 x daily - 7 x weekly - 2-3 sets - 10 reps  Sidelying Shoulder Abduction Palm Forward - 1-2 x daily - 7 x weekly - 2-3 sets - 10 reps      Therapeutic Exercise and NMR EXR  [x] (80719) Provided verbal/tactile cueing for activities related to strengthening, flexibility, endurance, ROM  for improvements in scapular, scapulothoracic and UE control with self care, reaching, carrying, lifting, house/yardwork, driving/computer work.    [] (81145) Provided verbal/tactile cueing for activities related to improving balance, coordination, kinesthetic sense, posture, motor skill, proprioception  to assist with  scapular, scapulothoracic and UE control with self care, reaching, carrying, lifting, house/yardwork, driving/computer work. Therapeutic Activities:    [] (68652 or 35192) Provided verbal/tactile cueing for activities related to improving balance, coordination, kinesthetic sense, posture, motor skill, proprioception and motor activation to allow for proper function of scapular, scapulothoracic and UE control with self care, carrying, lifting, driving/computer work. Home Exercise Program:    [x] (19819) Reviewed/Progressed HEP activities related to strengthening, flexibility, endurance, ROM of scapular, scapulothoracic and UE control with self care, reaching, carrying, lifting, house/yardwork, driving/computer work  [] (21068) Reviewed/Progressed HEP activities related to improving balance, coordination, kinesthetic sense, posture, motor skill, proprioception of scapular, scapulothoracic and UE control with self care, reaching, carrying, lifting, house/yardwork, driving/computer work      Manual Treatments:  PROM / STM / Oscillations-Mobs:  G-I, II, III, IV (PA's, Inf., Post.)  [x] (50287) Provided manual therapy to mobilize soft tissue/joints of cervical/CT, scapular GHJ and UE for the purpose of modulating pain, promoting relaxation,  increasing ROM, reducing/eliminating soft tissue swelling/inflammation/restriction, improving soft tissue extensibility and allowing for proper ROM for normal function with self care, reaching, carrying, lifting, house/yardwork, driving/computer work    Modalities:  Game Ready to L shoulder for pain/inflammation/edema - 10 minutes.      Charges:  Timed Code Treatment Minutes: 50   Total Treatment Minutes: 60       [] EVAL (LOW) 46645 (typically 20 minutes face-to-face)  [] EVAL (MOD) 99122 (typically 30 minutes face-to-face)  [] EVAL (HIGH) 04583 (typically 45 minutes face-to-face)  [] RE-EVAL     [x] BN(95912) x 1  [] DRY NEEDLE 1 OR 2 MUSCLES  [] NMR (70941) x     [] DRY NEEDLE 3+ MUSCLES  [x] Manual (58426) x 2      [] TA (07674) x     [] Mech Traction (67361)  [] ES(attended) (39812)     [] ES (un) (66639):   [] VASO (65137)  [] Other:     GOALS:  Patient stated goal: work pain free  [x] Progressing: [] Met: [] Not Met: [] Adjusted    Therapist goals for Patient:   Short Term Goals: To be achieved in: 2 weeks  1. Independent in HEP and progression per patient tolerance, in order to prevent re-injury. [] Progressing: [x] Met: [] Not Met: [] Adjusted  2. Patient will have a decrease in pain to facilitate improvement in movement, function, and ADLs as indicated by Functional Deficits. [x] Progressing: [] Met: [] Not Met: [] Adjusted    Long Term Goals: To be achieved in: 10 weeks  1. Disability index score of 10% or less for the Quick DASH to assist with reaching prior level of function. [x] Progressing: [] Met: [] Not Met: [] Adjusted  2. Patient will demonstrate increased AROM to [facility] to allow for proper joint functioning as indicated by Functional Deficits. [x] Progressing: [] Met: [] Not Met: [] Adjusted  3. Patient will demonstrate an increase in NM recruitment/activation and overall GH and scapular strength to within n5lbs HHD or WNL for proper functional mobility as indicated by patients Functional Deficits. [x] Progressing: [] Met: [] Not Met: [] Adjusted  4. Patient will return to working out without pain  activities without increased symptoms or restriction. [x] Progressing: [] Met: [] Not Met: [] Adjusted  5. No pain with sleeping(patient specific functional goal)     [x] Progressing: [] Met: [] Not Met: [] Adjusted    ASSESSMENT:   Continued all manual techniques and stretching to continued maintenance and progression of L shoulder ROM/mobility. Significant tightness through subscap today which improved drastically following STM and hold while pt stretched into flexion w/ active flexion range improving 20 deg after.  Held some strength 2/2 increased manual time and reported increased soreness to allow patient to recover w/ day off work tomorrow. Patient POC to decrease down to 2x per week starting next week per physician recommendations. Return to Play: (if applicable)    []  Stage 1: Intro to Strength   []  Stage 2: Dynamic Strength and Intro to Plyometrics   []  Stage 3: Advanced Plyometrics and Intro to Throwing   []  Stage 4: Sport specific Training/Return to Sport     []  Ready to Return to Play, [employer]   []  Not Ready for Return to Sports   Comments:      Treatment/Activity Tolerance:  [x] Patient tolerated treatment well [] Patient limited by fatique  [] Patient limited by pain  [] Patient limited by other medical complications  [] Other:     Overall Progression Towards Functional goals/ Treatment Progress Update:  [] Patient is progressing as expected towards functional goals listed. [x] Progression is slowed due to complexities/Impairments listed. - presenting like potential frozen shoulder resulting from fall after surgery  [] Progression has been slowed due to co-morbidities. [] Plan just implemented, too soon to assess goals progression <30days   [] Goals require adjustment due to lack of progress  [] Patient is not progressing as expected and requires additional follow up with physician  [] Other    Prognosis for POC: [x] Good [] Fair  [] Poor    Patient requires continued skilled intervention: [x] Yes  [] No      PLAN: Continue PT 2x/week for 4 weeks  [x] Continue per plan of care [x] Alter current plan (see comments)  [] Plan of care initiated [] Hold pending MD visit [] Discharge    Electronically signed by: Wendy Choi, PT, DPT, MS, SCS    Note: If patient does not return for scheduled/recommended follow up visits, this note will serve as a discharge from care along with the most recent update on progress.

## 2022-04-08 ENCOUNTER — HOSPITAL ENCOUNTER (OUTPATIENT)
Dept: PHYSICAL THERAPY | Age: 47
Setting detail: THERAPIES SERIES
Discharge: HOME | End: 2022-04-08
Payer: COMMERCIAL

## 2022-04-08 PROCEDURE — 97140 MANUAL THERAPY 1/> REGIONS: CPT

## 2022-04-08 PROCEDURE — 97110 THERAPEUTIC EXERCISES: CPT

## 2022-04-08 NOTE — FLOWSHEET NOTE
[place] [address] [address]  Phone: [phone] Fax: [phone]        Physical Therapy Treatment Note/ Progress Report:     Date:  2022    Patient Name:  Marquis Maciel    :  1975  MRN: [id number]  Restrictions/Precautions:    Medical/Treatment Diagnosis Information:  · Diagnosis: L shoulder arthroscopy, SAD, DCE, shoulder pain M25. 512  · Treatment Diagnosis: L shoulder arthroscopy, SAD, DCE, shoulder pain M25. 668  Insurance/Certification information:     Physician Information:  Referring Practitioner: Dr Sofi Ruano of care signed (Y/N):     Date of Patient follow up with Physician:      Progress Report: []  Yes  [x]  No     Date Range for reporting period:  Beginnin21  Endin22     Progress report due (10 Rx/or 30 days whichever is less):        Recertification due (POC duration/ or 90 days whichever is less):   22    Visit # Insurance Allowable Auth Needed   30(14 in ) 40 []Yes    []No     Pain level:  2-3/10 soreness, achiness in L shoulder at start of session     SUBJECTIVE: States she felt some relief after massaging the muscle under her arm. Pt has been working on stretching throughout the work day as she is able as well as morning and night at home. Neck feels tight on the L side, but not stuck. OBJECTIVE: 3/25/22   Observation:    Test measurements:       ROM Left (passive)  Post-op day 1 Right   Shoulder Flex 160 170   Shoulder Abd 155 170   Shoulder ER @ 90 75 90   Shoulder IR @ 90 65 T5                   Strength  Left Right   Shoulder Flex  8.1 lb   Shoulder Scap  8.0 lb   Shoulder ER  15.7 lb   Shoulder IR  20.0 lb       RESTRICTIONS/PRECAUTIONS: L shoulder arthroscopy 21. L shoulder scope with scar tissue debridement 3/25/22.     Exercises/Interventions:   Therapeutic Ex (57411) -18 Sets/sec Reps CUES/Notes   UBE 4 min1/2 retro   Supine cane AA flexion - supine - varying angle 1 10 Used red tube for active flexion, scaption, abd   Supine wand ER stretch - varying angle 1 10 Used yellow ball for LLLD today   Table Slides - flexion/abduction  1 10 R UE assist   Subscap/lat stretch - hand on mat, backing up for OH flexion stretch 5s 10    Wall slides flexion/abduction/washes 1 10 Wall slide+lift-off per emily   IR towel stretch behind back 10s 10    Doorway ER stretch - 60 deg elevation 10s 10    Seated shoulder flexion stretch 3 min  Hands on table, scooting stool back   Seated lat stretch 2 min  L hand on table, rotating trunk   Snow angels on floor 1 10 Limited range on L   Supine SA punch - 5# cane      Active shoulder flexion    Staying within comfortable range   Sidelying shoulder ER      Sidelying shoulder abd to 90deg                        Manual Intervention  (90841) -29 min      L GH inferior and posterior glides  5 min Gr. II-III   L shoulder PROM - flex, abd, ER/IR  20 min Added subscap hold w/ OH flexion   Prone IR stretch  4 min Good tolerance                               NMR Re-education (28743)      T-spine Ext      GH depress/compress      Scap/GH NMR      Body blade      Wall ball roll      Wall Ball bounce      Ball drops      Dionne Scap Bio      Floor Snow angels-sliders            Therapeutic Activity (00702)      UE throwing porgression      Dynamic UE stability      Earthquake Bar      Bodyblade                  Access Code: [email or web address]  URL: [email or web address]  Date: 04/01/2022  Prepared by: Kinga Ferreira    Exercises  Supine Shoulder Protraction with Dowel - 1-2 x daily - 7 x weekly - 2-3 sets - 10 reps  Supine Shoulder Flexion Extension Full Range AROM - 1-2 x daily - 7 x weekly - 2-3 sets - 10 reps  Sidelying Shoulder External Rotation - 1-2 x daily - 7 x weekly - 2-3 sets - 10 reps  Sidelying Shoulder Abduction Palm Forward - 1-2 x daily - 7 x weekly - 2-3 sets - 10 reps      Therapeutic Exercise and NMR EXR  [x] (73474) Provided verbal/tactile cueing for activities related to strengthening, flexibility, endurance, ROM  for improvements in scapular, scapulothoracic and UE control with self care, reaching, carrying, lifting, house/yardwork, driving/computer work.    [] (83195) Provided verbal/tactile cueing for activities related to improving balance, coordination, kinesthetic sense, posture, motor skill, proprioception  to assist with  scapular, scapulothoracic and UE control with self care, reaching, carrying, lifting, house/yardwork, driving/computer work. Therapeutic Activities:    [] (97768 or 82148) Provided verbal/tactile cueing for activities related to improving balance, coordination, kinesthetic sense, posture, motor skill, proprioception and motor activation to allow for proper function of scapular, scapulothoracic and UE control with self care, carrying, lifting, driving/computer work.      Home Exercise Program:    [x] (09264) Reviewed/Progressed HEP activities related to strengthening, flexibility, endurance, ROM of scapular, scapulothoracic and UE control with self care, reaching, carrying, lifting, house/yardwork, driving/computer work  [] (53917) Reviewed/Progressed HEP activities related to improving balance, coordination, kinesthetic sense, posture, motor skill, proprioception of scapular, scapulothoracic and UE control with self care, reaching, carrying, lifting, house/yardwork, driving/computer work      Manual Treatments:  PROM / STM / Oscillations-Mobs:  G-I, II, III, IV (PA's, Inf., Post.)  [x] (12509) Provided manual therapy to mobilize soft tissue/joints of cervical/CT, scapular GHJ and UE for the purpose of modulating pain, promoting relaxation,  increasing ROM, reducing/eliminating soft tissue swelling/inflammation/restriction, improving soft tissue extensibility and allowing for proper ROM for normal function with self care, reaching, carrying, lifting, house/yardwork, driving/computer work    Modalities: Charges:  Timed Code Treatment Minutes: 47   Total Treatment Minutes: 47       [] EVAL (LOW) 21019 (typically 20 minutes face-to-face)  [] EVAL (MOD) 96176 (typically 30 minutes face-to-face)  [] EVAL (HIGH) 11745 (typically 45 minutes face-to-face)  [] RE-EVAL     [x] AZ(83946) x 1  [] DRY NEEDLE 1 OR 2 MUSCLES  [] NMR (06696) x     [] DRY NEEDLE 3+ MUSCLES  [x] Manual (24637) x 2      [] TA (50145) x     [] Mech Traction (28282)  [] ES(attended) (96109)     [] ES (un) (03592):   [] VASO (08520)  [] Other:     GOALS:  Patient stated goal: work pain free  [x] Progressing: [] Met: [] Not Met: [] Adjusted    Therapist goals for Patient:   Short Term Goals: To be achieved in: 2 weeks  1. Independent in HEP and progression per patient tolerance, in order to prevent re-injury. [] Progressing: [x] Met: [] Not Met: [] Adjusted  2. Patient will have a decrease in pain to facilitate improvement in movement, function, and ADLs as indicated by Functional Deficits. [x] Progressing: [] Met: [] Not Met: [] Adjusted    Long Term Goals: To be achieved in: 10 weeks  1. Disability index score of 10% or less for the Quick DASH to assist with reaching prior level of function. [x] Progressing: [] Met: [] Not Met: [] Adjusted  2. Patient will demonstrate increased AROM to [facility] to allow for proper joint functioning as indicated by Functional Deficits. [x] Progressing: [] Met: [] Not Met: [] Adjusted  3. Patient will demonstrate an increase in NM recruitment/activation and overall GH and scapular strength to within n5lbs HHD or WNL for proper functional mobility as indicated by patients Functional Deficits. [x] Progressing: [] Met: [] Not Met: [] Adjusted  4. Patient will return to working out without pain  activities without increased symptoms or restriction. [x] Progressing: [] Met: [] Not Met: [] Adjusted  5.  No pain with sleeping(patient specific functional goal)     [x] Progressing: [] Met: [] Not Met: [] Adjusted    ASSESSMENT:   Continued all manual techniques and stretching to continued maintenance and progression of L shoulder ROM/mobility. Significant tightness through subscap today which improved drastically following STM and hold while pt stretched into flexion w/ active flexion range improving 20 deg after. Held some strength 2/2 increased manual time. Good improvement with shoulder IR after prone manual stretch. Return to Play: (if applicable)    []  Stage 1: Intro to Strength   []  Stage 2: Dynamic Strength and Intro to Plyometrics   []  Stage 3: Advanced Plyometrics and Intro to Throwing   []  Stage 4: Sport specific Training/Return to Sport     []  Ready to Return to Play, [employer]   []  Not Ready for Return to Sports   Comments:      Treatment/Activity Tolerance:  [x] Patient tolerated treatment well [] Patient limited by fatique  [] Patient limited by pain  [] Patient limited by other medical complications  [] Other:     Overall Progression Towards Functional goals/ Treatment Progress Update:  [] Patient is progressing as expected towards functional goals listed. [x] Progression is slowed due to complexities/Impairments listed. - presenting like potential frozen shoulder resulting from fall after surgery  [] Progression has been slowed due to co-morbidities.   [] Plan just implemented, too soon to assess goals progression <30days   [] Goals require adjustment due to lack of progress  [] Patient is not progressing as expected and requires additional follow up with physician  [] Other    Prognosis for POC: [x] Good [] Fair  [] Poor    Patient requires continued skilled intervention: [x] Yes  [] No      PLAN: Continue PT 2x/week for 4 weeks  [x] Continue per plan of care [] Alter current plan (see comments)  [] Plan of care initiated [] Hold pending MD visit [] Discharge    Electronically signed by: Vishal Lockwood PTA    Note: If patient does not return for scheduled/recommended follow up visits, this note will serve as a discharge from care along with the most recent update on progress.

## 2022-04-11 ENCOUNTER — HOSPITAL ENCOUNTER (OUTPATIENT)
Dept: PHYSICAL THERAPY | Age: 47
Setting detail: THERAPIES SERIES
Discharge: HOME | End: 2022-04-11
Payer: COMMERCIAL

## 2022-04-11 PROCEDURE — 97140 MANUAL THERAPY 1/> REGIONS: CPT

## 2022-04-11 PROCEDURE — 97110 THERAPEUTIC EXERCISES: CPT

## 2022-04-12 NOTE — FLOWSHEET NOTE
[place] [address]  Phone: [phone] Fax: [phone]        Physical Therapy Treatment Note/ Progress Report:     Date:  2022    Patient Name:  Loy Holder    :  1975  MRN: [id number]  Restrictions/Precautions:    Medical/Treatment Diagnosis Information:  · Diagnosis: L shoulder arthroscopy, SAD, DCE, shoulder pain M25. 512  · Treatment Diagnosis: L shoulder arthroscopy, SAD, DCE, shoulder pain M25. 068  Insurance/Certification information:     Physician Information:  Referring Practitioner: Dr Diaz Leader of care signed (Y/N):     Date of Patient follow up with Physician:      Progress Report: []  Yes  [x]  No     Date Range for reporting period:  Beginnin21  Endin22     Progress report due (10 Rx/or 30 days whichever is less):    3/52/82    Recertification due (POC duration/ or 90 days whichever is less):   22    Visit # Insurance Allowable Auth Needed   31(14 in ) 40 []Yes    []No     Pain level:  2-3/10 soreness, achiness in L shoulder at start of session     SUBJECTIVE: States overall she feels like range is improving, however work demands continue to make her very tired and sore by the end of day. Did a lot of fillings today and is feeling very tight through middle of shoulder blade. OBJECTIVE: 3/25/22   Observation:    Test measurements:       ROM Left (passive)  Post-op day 1 Right   Shoulder Flex 160 170   Shoulder Abd 155 170   Shoulder ER @ 90 75 90   Shoulder IR @ 90 65 T5                   Strength  Left Right   Shoulder Flex  8.1 lb   Shoulder Scap  8.0 lb   Shoulder ER  15.7 lb   Shoulder IR  20.0 lb       RESTRICTIONS/PRECAUTIONS: L shoulder arthroscopy 21. L shoulder scope with scar tissue debridement 3/25/22.     Exercises/Interventions:   Therapeutic Ex (11438) -18 Sets/sec Reps CUES/Notes   UBE 4 min1/2 retro   Supine cane AA flexion - supine - varying angle 1 10 Used red tube for active flexion, scaption, abd   Supine wand ER stretch - varying angle 1 10 Used yellow ball for LLLD today   Table Slides - flexion/abduction  1 10 R UE assist   Subscap/lat stretch - hand on mat, backing up for OH flexion stretch 5s 10    Wall slides flexion/abduction/washes 1 10 Wall slide+lift-off per emily   IR towel stretch behind back 10s 10    Doorway ER stretch - 60 deg elevation 10s 10    Seated shoulder flexion stretch 3 min  Hands on table, scooting stool back   Seated lat stretch 2 min  L hand on table, rotating trunk   Snow angels on floor 1 10 Limited range on L   Supine SA punch - 5# cane      Active shoulder flexion    Staying within comfortable range   Sidelying shoulder ER      Sidelying shoulder abd to 90deg                        Manual Intervention  (71695) -29 min       L GH inferior and posterior glides  5 min Gr. II-III   L shoulder PROM - flex, abd, ER/IR  15 min Added subscap hold w/ OH flexion   Prone IR stretch  4 min Good tolerance   STM levator and subscap  5 min                          NMR Re-education (67278)      T-spine Ext      GH depress/compress      Scap/GH NMR      Body blade      Wall ball roll      Wall Ball bounce      Ball drops      Dionne Scap Bio      Floor Snow angels-sliders            Therapeutic Activity (27130)      UE throwing porgression      Dynamic UE stability      Earthquake Bar      Bodyblade                  Access Code: [id number]  URL: [email or web address]  Date: 04/01/2022  Prepared by: Elaine Dupont    Exercises  Supine Shoulder Protraction with Dowel - 1-2 x daily - 7 x weekly - 2-3 sets - 10 reps  Supine Shoulder Flexion Extension Full Range AROM - 1-2 x daily - 7 x weekly - 2-3 sets - 10 reps  Sidelying Shoulder External Rotation - 1-2 x daily - 7 x weekly - 2-3 sets - 10 reps  Sidelying Shoulder Abduction Palm Forward - 1-2 x daily - 7 x weekly - 2-3 sets - 10 reps      Therapeutic Exercise and NMR EXR  [x] (32936) Provided verbal/tactile cueing for activities related to strengthening, flexibility, endurance, ROM  for improvements in scapular, scapulothoracic and UE control with self care, reaching, carrying, lifting, house/yardwork, driving/computer work.    [] (65398) Provided verbal/tactile cueing for activities related to improving balance, coordination, kinesthetic sense, posture, motor skill, proprioception  to assist with  scapular, scapulothoracic and UE control with self care, reaching, carrying, lifting, house/yardwork, driving/computer work. Therapeutic Activities:    [] (02745 or 34571) Provided verbal/tactile cueing for activities related to improving balance, coordination, kinesthetic sense, posture, motor skill, proprioception and motor activation to allow for proper function of scapular, scapulothoracic and UE control with self care, carrying, lifting, driving/computer work.      Home Exercise Program:    [x] (96860) Reviewed/Progressed HEP activities related to strengthening, flexibility, endurance, ROM of scapular, scapulothoracic and UE control with self care, reaching, carrying, lifting, house/yardwork, driving/computer work  [] (04757) Reviewed/Progressed HEP activities related to improving balance, coordination, kinesthetic sense, posture, motor skill, proprioception of scapular, scapulothoracic and UE control with self care, reaching, carrying, lifting, house/yardwork, driving/computer work      Manual Treatments:  PROM / STM / Oscillations-Mobs:  G-I, II, III, IV (PA's, Inf., Post.)  [x] (36008) Provided manual therapy to mobilize soft tissue/joints of cervical/CT, scapular GHJ and UE for the purpose of modulating pain, promoting relaxation,  increasing ROM, reducing/eliminating soft tissue swelling/inflammation/restriction, improving soft tissue extensibility and allowing for proper ROM for normal function with self care, reaching, carrying, lifting, house/yardwork, driving/computer work    Modalities:       Charges:  Timed Code Treatment Minutes: 47   Total Treatment Minutes: 47       [] EVAL (LOW) 26143 (typically 20 minutes face-to-face)  [] EVAL (MOD) 30732 (typically 30 minutes face-to-face)  [] EVAL (HIGH) 46139 (typically 45 minutes face-to-face)  [] RE-EVAL     [x] LS(19857) x 1  [] DRY NEEDLE 1 OR 2 MUSCLES  [] NMR (00875) x     [] DRY NEEDLE 3+ MUSCLES  [x] Manual (71765) x 2      [] TA (76146) x     [] Mech Traction (96350)  [] ES(attended) (96914)     [] ES (un) (67916):   [] VASO (70780)  [] Other:     GOALS:  Patient stated goal: work pain free  [x] Progressing: [] Met: [] Not Met: [] Adjusted    Therapist goals for Patient:   Short Term Goals: To be achieved in: 2 weeks  1. Independent in HEP and progression per patient tolerance, in order to prevent re-injury. [] Progressing: [x] Met: [] Not Met: [] Adjusted  2. Patient will have a decrease in pain to facilitate improvement in movement, function, and ADLs as indicated by Functional Deficits. [x] Progressing: [] Met: [] Not Met: [] Adjusted    Long Term Goals: To be achieved in: 10 weeks  1. Disability index score of 10% or less for the Quick DASH to assist with reaching prior level of function. [x] Progressing: [] Met: [] Not Met: [] Adjusted  2. Patient will demonstrate increased AROM to [facility] to allow for proper joint functioning as indicated by Functional Deficits. [x] Progressing: [] Met: [] Not Met: [] Adjusted  3. Patient will demonstrate an increase in NM recruitment/activation and overall GH and scapular strength to within n5lbs HHD or WNL for proper functional mobility as indicated by patients Functional Deficits. [x] Progressing: [] Met: [] Not Met: [] Adjusted  4. Patient will return to working out without pain  activities without increased symptoms or restriction. [x] Progressing: [] Met: [] Not Met: [] Adjusted  5.  No pain with sleeping(patient specific functional goal)     [x] Progressing: [] Met: [] Not Met: [] Adjusted    ASSESSMENT:   Continued all manual techniques and stretching to continued maintenance and progression of L shoulder ROM/mobility. Significant tightness through subscap today which improved drastically following STM and hold while pt stretched into flexion w/ active flexion improving to near full range by end of session. Good improvement with shoulder IR after prone manual stretch. Overall pt reported feeling much better and had significant improvement in functional range by end of session. Return to Play: (if applicable)    []  Stage 1: Intro to Strength   []  Stage 2: Dynamic Strength and Intro to Plyometrics   []  Stage 3: Advanced Plyometrics and Intro to Throwing   []  Stage 4: Sport specific Training/Return to Sport     []  Ready to Return to Play, [employer]   []  Not Ready for Return to Sports   Comments:      Treatment/Activity Tolerance:  [x] Patient tolerated treatment well [] Patient limited by fatique  [] Patient limited by pain  [] Patient limited by other medical complications  [] Other:     Overall Progression Towards Functional goals/ Treatment Progress Update:  [] Patient is progressing as expected towards functional goals listed. [x] Progression is slowed due to complexities/Impairments listed. - presenting like potential frozen shoulder resulting from fall after surgery  [] Progression has been slowed due to co-morbidities.   [] Plan just implemented, too soon to assess goals progression <30days   [] Goals require adjustment due to lack of progress  [] Patient is not progressing as expected and requires additional follow up with physician  [] Other    Prognosis for POC: [x] Good [] Fair  [] Poor    Patient requires continued skilled intervention: [x] Yes  [] No      PLAN: Continue PT 2x/week for 4 weeks  [x] Continue per plan of care [] Alter current plan (see comments)  [] Plan of care initiated [] Hold pending MD visit [] Discharge    Electronically signed by: Omaira Argueta PT    Note: If patient does not return for scheduled/recommended follow up visits, this note will serve as a discharge from care along with the most recent update on progress.

## 2022-04-15 ENCOUNTER — HOSPITAL ENCOUNTER (OUTPATIENT)
Dept: PHYSICAL THERAPY | Age: 47
Setting detail: THERAPIES SERIES
Discharge: HOME | End: 2022-04-15
Payer: COMMERCIAL

## 2022-04-15 PROCEDURE — 97140 MANUAL THERAPY 1/> REGIONS: CPT

## 2022-04-15 PROCEDURE — 97110 THERAPEUTIC EXERCISES: CPT

## 2022-04-15 NOTE — FLOWSHEET NOTE
weekly - 2-3 sets - 10 reps      Therapeutic Exercise and NMR EXR  [x] (10082) Provided verbal/tactile cueing for activities related to strengthening, flexibility, endurance, ROM  for improvements in scapular, scapulothoracic and UE control with self care, reaching, carrying, lifting, house/yardwork, driving/computer work.    [] (17242) Provided verbal/tactile cueing for activities related to improving balance, coordination, kinesthetic sense, posture, motor skill, proprioception  to assist with  scapular, scapulothoracic and UE control with self care, reaching, carrying, lifting, house/yardwork, driving/computer work. Therapeutic Activities:    [] (24534 or 37755) Provided verbal/tactile cueing for activities related to improving balance, coordination, kinesthetic sense, posture, motor skill, proprioception and motor activation to allow for proper function of scapular, scapulothoracic and UE control with self care, carrying, lifting, driving/computer work.      Home Exercise Program:    [x] (52861) Reviewed/Progressed HEP activities related to strengthening, flexibility, endurance, ROM of scapular, scapulothoracic and UE control with self care, reaching, carrying, lifting, house/yardwork, driving/computer work  [] (53775) Reviewed/Progressed HEP activities related to improving balance, coordination, kinesthetic sense, posture, motor skill, proprioception of scapular, scapulothoracic and UE control with self care, reaching, carrying, lifting, house/yardwork, driving/computer work      Manual Treatments:  PROM / STM / Oscillations-Mobs:  G-I, II, III, IV (PA's, Inf., Post.)  [x] (08795) Provided manual therapy to mobilize soft tissue/joints of cervical/CT, scapular GHJ and UE for the purpose of modulating pain, promoting relaxation,  increasing ROM, reducing/eliminating soft tissue swelling/inflammation/restriction, improving soft tissue extensibility and allowing for proper ROM for normal function with self care, reaching, carrying, lifting, house/yardwork, driving/computer work    Modalities:       Charges:  Timed Code Treatment Minutes: 45   Total Treatment Minutes: 45       [] EVAL (LOW) 55501 (typically 20 minutes face-to-face)  [] EVAL (MOD) 43681 (typically 30 minutes face-to-face)  [] EVAL (HIGH) 11764 (typically 45 minutes face-to-face)  [] RE-EVAL     [x] EK(08853) x 1  [] DRY NEEDLE 1 OR 2 MUSCLES  [] NMR (18979) x     [] DRY NEEDLE 3+ MUSCLES  [x] Manual (29190) x 2      [] TA (29034) x     [] Mech Traction (65750)  [] ES(attended) (03419)     [] ES (un) (70013):   [] VASO (36381)  [] Other:     GOALS:  Patient stated goal: work pain free  [x] Progressing: [] Met: [] Not Met: [] Adjusted    Therapist goals for Patient:   Short Term Goals: To be achieved in: 2 weeks  1. Independent in HEP and progression per patient tolerance, in order to prevent re-injury. [] Progressing: [x] Met: [] Not Met: [] Adjusted  2. Patient will have a decrease in pain to facilitate improvement in movement, function, and ADLs as indicated by Functional Deficits. [x] Progressing: [] Met: [] Not Met: [] Adjusted    Long Term Goals: To be achieved in: 10 weeks  1. Disability index score of 10% or less for the Quick DASH to assist with reaching prior level of function. [x] Progressing: [] Met: [] Not Met: [] Adjusted  2. Patient will demonstrate increased AROM to [facility] to allow for proper joint functioning as indicated by Functional Deficits. [x] Progressing: [] Met: [] Not Met: [] Adjusted  3. Patient will demonstrate an increase in NM recruitment/activation and overall GH and scapular strength to within n5lbs HHD or WNL for proper functional mobility as indicated by patients Functional Deficits. [x] Progressing: [] Met: [] Not Met: [] Adjusted  4. Patient will return to working out without pain  activities without increased symptoms or restriction. [x] Progressing: [] Met: [] Not Met: [] Adjusted  5.  No pain with sleeping(patient specific functional goal)     [x] Progressing: [] Met: [] Not Met: [] Adjusted    ASSESSMENT:   Continued all manual techniques and stretching to continued maintenance and progression of L shoulder ROM/mobility. Good improvement with shoulder IR after prone manual stretch. Overall Pt reported feeling much better and had significant improvement in functional range by end of session. Return to Play: (if applicable)    []  Stage 1: Intro to Strength   []  Stage 2: Dynamic Strength and Intro to Plyometrics   []  Stage 3: Advanced Plyometrics and Intro to Throwing   []  Stage 4: Sport specific Training/Return to Sport     []  Ready to Return to Play, [employer]   []  Not Ready for Return to Sports   Comments:      Treatment/Activity Tolerance:  [x] Patient tolerated treatment well [] Patient limited by fatique  [] Patient limited by pain  [] Patient limited by other medical complications  [] Other:     Overall Progression Towards Functional goals/ Treatment Progress Update:  [] Patient is progressing as expected towards functional goals listed. [x] Progression is slowed due to complexities/Impairments listed. - presenting like potential frozen shoulder resulting from fall after surgery  [] Progression has been slowed due to co-morbidities.   [] Plan just implemented, too soon to assess goals progression <30days   [] Goals require adjustment due to lack of progress  [] Patient is not progressing as expected and requires additional follow up with physician  [] Other    Prognosis for POC: [x] Good [] Fair  [] Poor    Patient requires continued skilled intervention: [x] Yes  [] No      PLAN: Continue PT 2x/week for 4 weeks  [x] Continue per plan of care [] Alter current plan (see comments)  [] Plan of care initiated [] Hold pending MD visit [] Discharge    Electronically signed by: Matilda Christopher PTA    Note: If patient does not return for scheduled/recommended follow up visits, this note will serve as a discharge from care along with the most recent update on progress.

## 2022-04-18 ENCOUNTER — HOSPITAL ENCOUNTER (OUTPATIENT)
Dept: PHYSICAL THERAPY | Age: 47
Setting detail: THERAPIES SERIES
Discharge: HOME | End: 2022-04-18
Payer: COMMERCIAL

## 2022-04-18 PROCEDURE — 97110 THERAPEUTIC EXERCISES: CPT

## 2022-04-18 PROCEDURE — 97140 MANUAL THERAPY 1/> REGIONS: CPT

## 2022-04-21 NOTE — FLOWSHEET NOTE
[facility] [address] [address]  Phone: [phone] Fax: [phone]        Physical Therapy Treatment Note/ Progress Report:     Date:  2022    Patient Name:  Leslie Kim    :  1975  MRN: [id number]  Restrictions/Precautions:    Medical/Treatment Diagnosis Information:  · Diagnosis: L shoulder arthroscopy, SAD, DCE, shoulder pain M25. 512  · Treatment Diagnosis: L shoulder arthroscopy, SAD, DCE, shoulder pain M25. 029  Insurance/Certification information:     Physician Information:  Referring Practitioner: Dr Kaelyn Laboy of care signed (Y/N):     Date of Patient follow up with Physician:      Progress Report: []  Yes  [x]  No     Date Range for reporting period:  Beginnin21  Endin22     Progress report due (10 Rx/or 30 days whichever is less):        Recertification due (POC duration/ or 90 days whichever is less):   22    Visit # Insurance Allowable Auth Needed   33 (15 in ) 40 []Yes    []No     Pain level:  2-3/10 soreness, achiness in L shoulder at start of session     SUBJECTIVE: States overall she is feeling better but still very tight through neck and occasionally having shooting pain into arm. OBJECTIVE: 3/25/22   Observation:    Test measurements:       ROM Left (passive)  Post-op day 1 Right   Shoulder Flex 160 170   Shoulder Abd 155 170   Shoulder ER @ 90 75 90   Shoulder IR @ 90 65 T5                   Strength  Left Right   Shoulder Flex  8.1 lb   Shoulder Scap  8.0 lb   Shoulder ER  15.7 lb   Shoulder IR  20.0 lb       RESTRICTIONS/PRECAUTIONS: L shoulder arthroscopy 21. L shoulder scope with scar tissue debridement 3/25/22.     Exercises/Interventions:   Therapeutic Ex (98471) -15 Sets/sec Reps CUES/Notes   UBE 4 min1/2 retro   Supine cane AA flexion - supine - varying angle 1 10 Used red tube for active flexion, scaption, abd   Supine wand ER stretch - varying angle 1 10 Used yellow ball for LLLD today   Table Slides - flexion/abduction  1 10 R UE assist   Subscap/lat stretch - hand on mat, backing up for OH flexion stretch 5s 10    Wall slides flexion/abduction/washes 1 10 Wall slide+lift-off per emily   IR towel stretch behind back 10s 10    Doorway ER stretch - 70 deg elevation 10s 10    Seated shoulder flexion stretch 3 min  Hands on table, scooting stool back   Seated lat stretch 2 min  L hand on table, rotating trunk   Snow angels on floor 1 10 Limited range on L   Supine SA punch - 5# cane      Active shoulder flexion    Staying within comfortable range   Sidelying shoulder ER      Sidelying shoulder abd to 90deg                        Manual Intervention  (79764) -30 min       L GH inferior and posterior glides  6 min Gr. II-III   L shoulder PROM - flex, abd, ER/IR  15 min Added subscap hold w/ OH flexion   Prone IR stretch  4 min Good tolerance   STM levator and subscap  5 min                          NMR Re-education (32815)      T-spine Ext      GH depress/compress      Scap/GH NMR      Body blade      Wall ball roll      Wall Ball bounce      Ball drops      Dionne Scap Bio      Floor Snow angels-sliders            Therapeutic Activity (42434)      UE throwing porgression      Dynamic UE stability      Earthquake Bar      Bodyblade                  Access Code: [email or web address]  URL: [email or web address]  Date: 04/01/2022  Prepared by: Kinga Ferreira    Exercises  Supine Shoulder Protraction with Dowel - 1-2 x daily - 7 x weekly - 2-3 sets - 10 reps  Supine Shoulder Flexion Extension Full Range AROM - 1-2 x daily - 7 x weekly - 2-3 sets - 10 reps  Sidelying Shoulder External Rotation - 1-2 x daily - 7 x weekly - 2-3 sets - 10 reps  Sidelying Shoulder Abduction Palm Forward - 1-2 x daily - 7 x weekly - 2-3 sets - 10 reps      Therapeutic Exercise and NMR EXR  [x] (22697) Provided verbal/tactile cueing for activities related to strengthening, flexibility, endurance, ROM  for improvements in scapular, scapulothoracic and UE control with self care, reaching, carrying, lifting, house/yardwork, driving/computer work.    [] (09338) Provided verbal/tactile cueing for activities related to improving balance, coordination, kinesthetic sense, posture, motor skill, proprioception  to assist with  scapular, scapulothoracic and UE control with self care, reaching, carrying, lifting, house/yardwork, driving/computer work. Therapeutic Activities:    [] (86841 or 02871) Provided verbal/tactile cueing for activities related to improving balance, coordination, kinesthetic sense, posture, motor skill, proprioception and motor activation to allow for proper function of scapular, scapulothoracic and UE control with self care, carrying, lifting, driving/computer work.      Home Exercise Program:    [x] (33283) Reviewed/Progressed HEP activities related to strengthening, flexibility, endurance, ROM of scapular, scapulothoracic and UE control with self care, reaching, carrying, lifting, house/yardwork, driving/computer work  [] (45258) Reviewed/Progressed HEP activities related to improving balance, coordination, kinesthetic sense, posture, motor skill, proprioception of scapular, scapulothoracic and UE control with self care, reaching, carrying, lifting, house/yardwork, driving/computer work      Manual Treatments:  PROM / STM / Oscillations-Mobs:  G-I, II, III, IV (PA's, Inf., Post.)  [x] (19971) Provided manual therapy to mobilize soft tissue/joints of cervical/CT, scapular GHJ and UE for the purpose of modulating pain, promoting relaxation,  increasing ROM, reducing/eliminating soft tissue swelling/inflammation/restriction, improving soft tissue extensibility and allowing for proper ROM for normal function with self care, reaching, carrying, lifting, house/yardwork, driving/computer work    Modalities:       Charges:  Timed Code Treatment Minutes: 45   Total Treatment Minutes: 45       [] MEGAN (LOW) 78668 (typically 20 minutes face-to-face)  [] EVAL (MOD) 82361 (typically 30 minutes face-to-face)  [] EVAL (HIGH) 41326 (typically 45 minutes face-to-face)  [] RE-EVAL     [x] EN(79093) x 1  [] DRY NEEDLE 1 OR 2 MUSCLES  [] NMR (83525) x     [] DRY NEEDLE 3+ MUSCLES  [x] Manual (30921) x 2      [] TA (30219) x     [] Mech Traction (04793)  [] ES(attended) (21602)     [] ES (un) (25741):   [] VASO (87387)  [] Other:     GOALS:  Patient stated goal: work pain free  [x] Progressing: [] Met: [] Not Met: [] Adjusted    Therapist goals for Patient:   Short Term Goals: To be achieved in: 2 weeks  1. Independent in HEP and progression per patient tolerance, in order to prevent re-injury. [] Progressing: [x] Met: [] Not Met: [] Adjusted  2. Patient will have a decrease in pain to facilitate improvement in movement, function, and ADLs as indicated by Functional Deficits. [x] Progressing: [] Met: [] Not Met: [] Adjusted    Long Term Goals: To be achieved in: 10 weeks  1. Disability index score of 10% or less for the Quick DASH to assist with reaching prior level of function. [x] Progressing: [] Met: [] Not Met: [] Adjusted  2. Patient will demonstrate increased AROM to [facility] to allow for proper joint functioning as indicated by Functional Deficits. [x] Progressing: [] Met: [] Not Met: [] Adjusted  3. Patient will demonstrate an increase in NM recruitment/activation and overall GH and scapular strength to within n5lbs HHD or WNL for proper functional mobility as indicated by patients Functional Deficits. [x] Progressing: [] Met: [] Not Met: [] Adjusted  4. Patient will return to working out without pain  activities without increased symptoms or restriction. [x] Progressing: [] Met: [] Not Met: [] Adjusted  5.  No pain with sleeping(patient specific functional goal)     [x] Progressing: [] Met: [] Not Met: [] Adjusted    ASSESSMENT:   Continued all manual techniques and stretching to continued maintenance and progression of L shoulder ROM/mobility. Overall Pt reported feeling much better and had significant improvement in functional range by end of session. Cervical mobility not significantly limited today but is restricted through CT and upper thoracic spine. Return to Play: (if applicable)    []  Stage 1: Intro to Strength   []  Stage 2: Dynamic Strength and Intro to Plyometrics   []  Stage 3: Advanced Plyometrics and Intro to Throwing   []  Stage 4: Sport specific Training/Return to Sport     []  Ready to Return to Play, [employer]   []  Not Ready for Return to Sports   Comments:      Treatment/Activity Tolerance:  [x] Patient tolerated treatment well [] Patient limited by fatique  [] Patient limited by pain  [] Patient limited by other medical complications  [] Other:     Overall Progression Towards Functional goals/ Treatment Progress Update:  [] Patient is progressing as expected towards functional goals listed. [x] Progression is slowed due to complexities/Impairments listed. - presenting like potential frozen shoulder resulting from fall after surgery  [] Progression has been slowed due to co-morbidities. [] Plan just implemented, too soon to assess goals progression <30days   [] Goals require adjustment due to lack of progress  [] Patient is not progressing as expected and requires additional follow up with physician  [] Other    Prognosis for POC: [x] Good [] Fair  [] Poor    Patient requires continued skilled intervention: [x] Yes  [] No      PLAN: Continue PT 2x/week for 4 weeks  [x] Continue per plan of care [] Alter current plan (see comments)  [] Plan of care initiated [] Hold pending MD visit [] Discharge    Electronically signed by: Adarsh Drummond PT    Note: If patient does not return for scheduled/recommended follow up visits, this note will serve as a discharge from care along with the most recent update on progress.

## 2022-04-22 ENCOUNTER — HOSPITAL ENCOUNTER (OUTPATIENT)
Dept: PHYSICAL THERAPY | Age: 47
Setting detail: THERAPIES SERIES
Discharge: HOME | End: 2022-04-22
Payer: COMMERCIAL

## 2022-04-22 PROCEDURE — 97110 THERAPEUTIC EXERCISES: CPT

## 2022-04-22 PROCEDURE — 97140 MANUAL THERAPY 1/> REGIONS: CPT

## 2022-04-22 NOTE — FLOWSHEET NOTE
[facility] [address] [facility]  Phone: [phone] Fax: [phone]        Physical Therapy Treatment Note/ Progress Report:     Date:  2022    Patient Name:  Olimpia Nevarez    :  1975  MRN: [id number]  Restrictions/Precautions:    Medical/Treatment Diagnosis Information:  · Diagnosis: L shoulder arthroscopy, SAD, DCE, shoulder pain M25. 512  · Treatment Diagnosis: L shoulder arthroscopy, SAD, DCE, shoulder pain M25. 142  Insurance/Certification information:     Physician Information:  Referring Practitioner: Dr Renay Clark of care signed (Y/N):     Date of Patient follow up with Physician:      Progress Report: []  Yes  [x]  No     Date Range for reporting period:  Beginnin21  Endin22     Progress report due (10 Rx/or 30 days whichever is less):        Recertification due (POC duration/ or 90 days whichever is less):   22    Visit # Insurance Allowable Auth Needed   34 (15 in ) 40 []Yes    []No     Pain level:  3/10 soreness, achiness in L shoulder and soreness in the back     SUBJECTIVE: Pt states that she had a very busy day at work today. She tries to stretch between patients, but depending on which positions she needs to hold her L arm in, feels very sore at the end of the day. Pt notes some tenderness at the L elbow too, which has been intermittent for a little while. Pt states that Dr. Gladis Elam wants to her to do a corner stretch and sidelying ER/IR stretch daily. OBJECTIVE: 3/25/22   Observation:    Test measurements:       ROM Left (passive)  Post-op day 1 Right   Shoulder Flex 160 170   Shoulder Abd 155 170   Shoulder ER @ 90 75 90   Shoulder IR @ 90 65 T5                   Strength  Left Right   Shoulder Flex  8.1 lb   Shoulder Scap  8.0 lb   Shoulder ER  15.7 lb   Shoulder IR  20.0 lb       RESTRICTIONS/PRECAUTIONS: L shoulder arthroscopy 21.  L shoulder scope with scar tissue debridement 3/25/22. Exercises/Interventions:   Therapeutic Ex (35129) -12 Sets/sec Reps CUES/Notes   UBE 4 min1/2 retro   Supine cane AA flexion - supine - varying angle 1 10 Used red tube for active flexion, scaption, abd   Supine wand ER stretch - varying angle 1 10 Used yellow ball for LLLD today   Table Slides - flexion/abduction  1 10 R UE assist   Subscap/lat stretch - hand on mat, backing up for OH flexion stretch 5s 10    Wall slides flexion/abduction/washes 1 10 Wall slide+lift-off per emily   IR towel stretch behind back 10s 10    Doorway ER stretch - 70 deg elevation 10s 10 Corner stretch   Seated shoulder flexion stretch 3 min  Hands on table, scooting stool back   Seated lat stretch 2 min  L hand on table, rotating trunk   [employer] on floor 1 10 Limited range on L   Supine SA punch - 5# cane      Active shoulder flexion    Staying within comfortable range   Sidelying shoulder ER      Sidelying shoulder abd to 90deg      Wrist extensor stretch 20sec 4                Manual Intervention  (15928) -34 min       L GH inferior and posterior glides  6 min Gr. II-III   L shoulder PROM - flex, abd, ER/IR  12 min    Prone IR stretch  4 min Good tolerance   STM levator and subscap  5 min   sidelying inferior glide  4 min   sidelying pin and stretch subscap  3 min                NMR Re-education (42232)      T-spine Ext      GH depress/compress      Scap/GH NMR      Body blade      Wall ball roll      Wall Ball bounce      Ball drops      Dionne Scap Bio      Floor Snow angels-sliders            Therapeutic Activity (64537)      UE throwing porgression      Dynamic UE stability      Earthquake Bar      Bodyblade                  Access Code: [id number]  URL: [email or web address]  Date: 04/01/2022  Prepared by: Haris Logan    Exercises  Supine Shoulder Protraction with Dowel - 1-2 x daily - 7 x weekly - 2-3 sets - 10 reps  Supine Shoulder Flexion Extension Full Range AROM - 1-2 x daily - 7 x weekly - 2-3 sets - 10 reps  Sidelying Shoulder External Rotation - 1-2 x daily - 7 x weekly - 2-3 sets - 10 reps  Sidelying Shoulder Abduction Palm Forward - 1-2 x daily - 7 x weekly - 2-3 sets - 10 reps      Therapeutic Exercise and NMR EXR  [x] (59624) Provided verbal/tactile cueing for activities related to strengthening, flexibility, endurance, ROM  for improvements in scapular, scapulothoracic and UE control with self care, reaching, carrying, lifting, house/yardwork, driving/computer work.    [] (05729) Provided verbal/tactile cueing for activities related to improving balance, coordination, kinesthetic sense, posture, motor skill, proprioception  to assist with  scapular, scapulothoracic and UE control with self care, reaching, carrying, lifting, house/yardwork, driving/computer work. Therapeutic Activities:    [] (76372 or 69390) Provided verbal/tactile cueing for activities related to improving balance, coordination, kinesthetic sense, posture, motor skill, proprioception and motor activation to allow for proper function of scapular, scapulothoracic and UE control with self care, carrying, lifting, driving/computer work.      Home Exercise Program:    [x] (39469) Reviewed/Progressed HEP activities related to strengthening, flexibility, endurance, ROM of scapular, scapulothoracic and UE control with self care, reaching, carrying, lifting, house/yardwork, driving/computer work  [] (50470) Reviewed/Progressed HEP activities related to improving balance, coordination, kinesthetic sense, posture, motor skill, proprioception of scapular, scapulothoracic and UE control with self care, reaching, carrying, lifting, house/yardwork, driving/computer work      Manual Treatments:  PROM / STM / Oscillations-Mobs:  G-I, II, III, IV (PA's, Inf., Post.)  [x] (24753) Provided manual therapy to mobilize soft tissue/joints of cervical/CT, scapular GHJ and UE for the purpose of modulating pain, promoting relaxation, increasing ROM, reducing/eliminating soft tissue swelling/inflammation/restriction, improving soft tissue extensibility and allowing for proper ROM for normal function with self care, reaching, carrying, lifting, house/yardwork, driving/computer work    Modalities:       Charges:  Timed Code Treatment Minutes: 46   Total Treatment Minutes: 46       [] EVAL (LOW) 09008 (typically 20 minutes face-to-face)  [] EVAL (MOD) 23358 (typically 30 minutes face-to-face)  [] EVAL (HIGH) 76215 (typically 45 minutes face-to-face)  [] RE-EVAL     [x] SC(35583) x 1  [] DRY NEEDLE 1 OR 2 MUSCLES  [] NMR (24752) x     [] DRY NEEDLE 3+ MUSCLES  [x] Manual (12248) x 2      [] TA (81716) x     [] Mech Traction (44032)  [] ES(attended) (06712)     [] ES (un) (70666):   [] VASO (51887)  [] Other:     GOALS:  Patient stated goal: work pain free  [x] Progressing: [] Met: [] Not Met: [] Adjusted    Therapist goals for Patient:   Short Term Goals: To be achieved in: 2 weeks  1. Independent in HEP and progression per patient tolerance, in order to prevent re-injury. [] Progressing: [x] Met: [] Not Met: [] Adjusted  2. Patient will have a decrease in pain to facilitate improvement in movement, function, and ADLs as indicated by Functional Deficits. [x] Progressing: [] Met: [] Not Met: [] Adjusted    Long Term Goals: To be achieved in: 10 weeks  1. Disability index score of 10% or less for the Quick DASH to assist with reaching prior level of function. [x] Progressing: [] Met: [] Not Met: [] Adjusted  2. Patient will demonstrate increased AROM to [facility] to allow for proper joint functioning as indicated by Functional Deficits. [x] Progressing: [] Met: [] Not Met: [] Adjusted  3. Patient will demonstrate an increase in NM recruitment/activation and overall GH and scapular strength to within n5lbs HHD or WNL for proper functional mobility as indicated by patients Functional Deficits. [x] Progressing: [] Met: [] Not Met: [] Adjusted  4. Patient will return to working out without pain  activities without increased symptoms or restriction. [x] Progressing: [] Met: [] Not Met: [] Adjusted  5. No pain with sleeping(patient specific functional goal)     [x] Progressing: [] Met: [] Not Met: [] Adjusted    ASSESSMENT:   Continued all manual techniques and stretching to continued maintenance and progression of L shoulder ROM/mobility. Overall Pt reported feeling less sore and had significant improvement in functional range by end of session. ER was most limited today. Return to Play: (if applicable)    []  Stage 1: Intro to Strength   []  Stage 2: Dynamic Strength and Intro to Plyometrics   []  Stage 3: Advanced Plyometrics and Intro to Throwing   []  Stage 4: Sport specific Training/Return to Sport     []  Ready to Return to Play, [employer]   []  Not Ready for Return to Sports   Comments:      Treatment/Activity Tolerance:  [x] Patient tolerated treatment well [] Patient limited by fatique  [] Patient limited by pain  [] Patient limited by other medical complications  [] Other:     Overall Progression Towards Functional goals/ Treatment Progress Update:  [] Patient is progressing as expected towards functional goals listed. [x] Progression is slowed due to complexities/Impairments listed. - presenting like potential frozen shoulder resulting from fall after surgery  [] Progression has been slowed due to co-morbidities.   [] Plan just implemented, too soon to assess goals progression <30days   [] Goals require adjustment due to lack of progress  [] Patient is not progressing as expected and requires additional follow up with physician  [] Other    Prognosis for POC: [x] Good [] Fair  [] Poor    Patient requires continued skilled intervention: [x] Yes  [] No      PLAN: Continue PT 2x/week for 4 weeks  [x] Continue per plan of care [] Alter current plan (see comments)  [] Plan of care initiated [] Hold pending MD visit [] Discharge    Electronically signed by: Mao Lynn PTA    Note: If patient does not return for scheduled/recommended follow up visits, this note will serve as a discharge from care along with the most recent update on progress.

## 2022-04-26 ENCOUNTER — APPOINTMENT (OUTPATIENT)
Dept: PHYSICAL THERAPY | Age: 47
End: 2022-04-26
Payer: COMMERCIAL

## 2022-05-02 ENCOUNTER — HOSPITAL ENCOUNTER (OUTPATIENT)
Dept: PHYSICAL THERAPY | Age: 47
Setting detail: THERAPIES SERIES
Discharge: HOME | End: 2022-05-02
Payer: COMMERCIAL

## 2022-05-02 PROCEDURE — 97110 THERAPEUTIC EXERCISES: CPT

## 2022-05-02 PROCEDURE — 97140 MANUAL THERAPY 1/> REGIONS: CPT

## 2022-05-03 NOTE — FLOWSHEET NOTE
[place] [address] [place] [address] [place]  Phone: [phone] Fax: [phone]        Physical Therapy Treatment Note/ Progress Report:     Date:  5/3/2022    Patient Name:  Adam Pelayo    :  1975  MRN: [id number]  Restrictions/Precautions:    Medical/Treatment Diagnosis Information:  · Diagnosis: L shoulder arthroscopy, SAD, DCE, shoulder pain M25. 512  · Treatment Diagnosis: L shoulder arthroscopy, SAD, DCE, shoulder pain M25. 170  Insurance/Certification information:     Physician Information:  Referring Practitioner: Dr Noriega Borne of care signed (Y/N):     Date of Patient follow up with Physician:      Progress Report: []  Yes  [x]  No     Date Range for reporting period:  Beginnin21  Endin22     Progress report due (10 Rx/or 30 days whichever is less):        Recertification due (POC duration/ or 90 days whichever is less):   22    Visit # Insurance Allowable Auth Needed   35 (15 in ) 40 []Yes    []No     Pain level:  3/10 soreness, achiness in L shoulder and soreness in the back     SUBJECTIVE: Pt just returned from trip to [place] and is feeling great. Feels like pain is much less and more just feels tight and stiff. OBJECTIVE: 3/25/22   Observation:    Test measurements:       ROM Left (passive)  Post-op day 1 Right   Shoulder Flex 160 170   Shoulder Abd 155 170   Shoulder ER @ 90 75 90   Shoulder IR @ 90 65 T5                   Strength  Left Right   Shoulder Flex  8.1 lb   Shoulder Scap  8.0 lb   Shoulder ER  15.7 lb   Shoulder IR  20.0 lb       RESTRICTIONS/PRECAUTIONS: L shoulder arthroscopy 21. L shoulder scope with scar tissue debridement 3/25/22.     Exercises/Interventions:   Therapeutic Ex (19670) -15 Sets/sec Reps CUES/Notes   UBE 4 min1/2 retro   Supine cane AA flexion - supine - varying angle 1 10 Used red tube for active flexion, scaption, abd   Supine wand ER stretch - varying angle 1 10 Used yellow ball for LLLD today   Table Slides - flexion/abduction  1 10 R UE assist   Subscap/lat stretch - hand on mat, backing up for OH flexion stretch 5s 10    Wall slides flexion/abduction/washes 1 10 Wall slide+lift-off per emily   IR towel stretch behind back 10s 10    Doorway ER stretch - 70 deg elevation 10s 10 Corner stretch   Seated shoulder flexion stretch 3 min  Hands on table, scooting stool back   Seated lat stretch 2 min  L hand on table, rotating trunk   [employer] on floor 1 10 Limited range on L   Supine SA punch - 5# cane      Active shoulder flexion    Staying within comfortable range   Sidelying shoulder ER      Sidelying shoulder abd to 90deg      Wrist extensor stretch 20sec 4    Prone snow timo  10    Supine snow timo   10 Partial range               Manual Intervention  (09456) -34 min       L GH inferior and posterior glides  6 min Gr. II-III   L shoulder PROM - flex, abd, ER/IR  12 min    Prone IR stretch  4 min Good tolerance   STM levator and subscap  5 min   sidelying inferior glide  4 min   sidelying pin and stretch subscap  3 min                NMR Re-education (69090)      T-spine Ext      GH depress/compress      Scap/GH NMR      Body blade      Wall ball roll      Wall Ball bounce      Ball drops      Dionne Scap Bio      Floor Snow angels-sliders            Therapeutic Activity (52972)      UE throwing porgression      Dynamic UE stability      Earthquake Bar      Bodyblade                  Access Code: [id number]  URL: [email or web address]  Date: 04/01/2022  Prepared by: Mere Naranjo    Exercises  Supine Shoulder Protraction with Dowel - 1-2 x daily - 7 x weekly - 2-3 sets - 10 reps  Supine Shoulder Flexion Extension Full Range AROM - 1-2 x daily - 7 x weekly - 2-3 sets - 10 reps  Sidelying Shoulder External Rotation - 1-2 x daily - 7 x weekly - 2-3 sets - 10 reps  Sidelying Shoulder Abduction Palm Forward - 1-2 x daily - 7 x weekly - 2-3 sets - 10 reps      Therapeutic Exercise and NMR EXR  [x] (39338) Provided verbal/tactile cueing for activities related to strengthening, flexibility, endurance, ROM  for improvements in scapular, scapulothoracic and UE control with self care, reaching, carrying, lifting, house/yardwork, driving/computer work.    [] (97784) Provided verbal/tactile cueing for activities related to improving balance, coordination, kinesthetic sense, posture, motor skill, proprioception  to assist with  scapular, scapulothoracic and UE control with self care, reaching, carrying, lifting, house/yardwork, driving/computer work. Therapeutic Activities:    [] (69408 or 36659) Provided verbal/tactile cueing for activities related to improving balance, coordination, kinesthetic sense, posture, motor skill, proprioception and motor activation to allow for proper function of scapular, scapulothoracic and UE control with self care, carrying, lifting, driving/computer work.      Home Exercise Program:    [x] (63594) Reviewed/Progressed HEP activities related to strengthening, flexibility, endurance, ROM of scapular, scapulothoracic and UE control with self care, reaching, carrying, lifting, house/yardwork, driving/computer work  [] (18371) Reviewed/Progressed HEP activities related to improving balance, coordination, kinesthetic sense, posture, motor skill, proprioception of scapular, scapulothoracic and UE control with self care, reaching, carrying, lifting, house/yardwork, driving/computer work      Manual Treatments:  PROM / STM / Oscillations-Mobs:  G-I, II, III, IV (PA's, Inf., Post.)  [x] (71159) Provided manual therapy to mobilize soft tissue/joints of cervical/CT, scapular GHJ and UE for the purpose of modulating pain, promoting relaxation,  increasing ROM, reducing/eliminating soft tissue swelling/inflammation/restriction, improving soft tissue extensibility and allowing for proper ROM for normal function with self care, reaching, carrying, lifting, house/yardwork, driving/computer work    Modalities:       Charges:  Timed Code Treatment Minutes: 46   Total Treatment Minutes: 46       [] EVAL (LOW) 08943 (typically 20 minutes face-to-face)  [] EVAL (MOD) 72931 (typically 30 minutes face-to-face)  [] EVAL (HIGH) 28918 (typically 45 minutes face-to-face)  [] RE-EVAL     [x] RAFA(85723) x 1  [] DRY NEEDLE 1 OR 2 MUSCLES  [] NMR (68225) x     [] DRY NEEDLE 3+ MUSCLES  [x] Manual (91224) x 2      [] TA (48202) x     [] Mech Traction (26240)  [] ES(attended) (74434)     [] ES (un) (83000):   [] VASO (90707)  [] Other:     GOALS:  Patient stated goal: work pain free  [x] Progressing: [] Met: [] Not Met: [] Adjusted    Therapist goals for Patient:   Short Term Goals: To be achieved in: 2 weeks  1. Independent in HEP and progression per patient tolerance, in order to prevent re-injury. [] Progressing: [x] Met: [] Not Met: [] Adjusted  2. Patient will have a decrease in pain to facilitate improvement in movement, function, and ADLs as indicated by Functional Deficits. [x] Progressing: [] Met: [] Not Met: [] Adjusted    Long Term Goals: To be achieved in: 10 weeks  1. Disability index score of 10% or less for the Quick DASH to assist with reaching prior level of function. [x] Progressing: [] Met: [] Not Met: [] Adjusted  2. Patient will demonstrate increased AROM to [facility] to allow for proper joint functioning as indicated by Functional Deficits. [x] Progressing: [] Met: [] Not Met: [] Adjusted  3. Patient will demonstrate an increase in NM recruitment/activation and overall GH and scapular strength to within n5lbs HHD or WNL for proper functional mobility as indicated by patients Functional Deficits. [x] Progressing: [] Met: [] Not Met: [] Adjusted  4. Patient will return to working out without pain  activities without increased symptoms or restriction. [x] Progressing: [] Met: [] Not Met: [] Adjusted  5.  No pain with sleeping(patient specific functional goal)     [x] Progressing: [] Met: [] Not Met: [] Adjusted    ASSESSMENT:   Continued all manual techniques and stretching to continued maintenance and progression of L shoulder ROM/mobility. Overall Pt reported feeling less sore and had significant improvement in functional range by end of session. ER was most limited today followed by IR, flexion and abduction full and pt able to perform full range prone abduction arc w/ minimal compensation only noted at top of range. Return to Play: (if applicable)    []  Stage 1: Intro to Strength   []  Stage 2: Dynamic Strength and Intro to Plyometrics   []  Stage 3: Advanced Plyometrics and Intro to Throwing   []  Stage 4: Sport specific Training/Return to Sport     []  Ready to Return to Play, [employer]   []  Not Ready for Return to Sports   Comments:      Treatment/Activity Tolerance:  [x] Patient tolerated treatment well [] Patient limited by fatique  [] Patient limited by pain  [] Patient limited by other medical complications  [] Other:     Overall Progression Towards Functional goals/ Treatment Progress Update:  [x] Patient is progressing as expected towards functional goals listed. [] Progression is slowed due to complexities/Impairments listed. - presenting like potential frozen shoulder resulting from fall after surgery  [] Progression has been slowed due to co-morbidities.   [] Plan just implemented, too soon to assess goals progression <30days   [] Goals require adjustment due to lack of progress  [] Patient is not progressing as expected and requires additional follow up with physician  [] Other    Prognosis for POC: [x] Good [] Fair  [] Poor    Patient requires continued skilled intervention: [x] Yes  [] No      PLAN: Continue PT 2x/week for 4 weeks  [x] Continue per plan of care [] Alter current plan (see comments)  [] Plan of care initiated [] Hold pending MD visit [] Discharge    Electronically signed by: Terell Tineo PT    Note: If patient does not return for scheduled/recommended follow up visits, this note will serve as a discharge from care along with the most recent update on progress.

## 2022-05-06 ENCOUNTER — HOSPITAL ENCOUNTER (OUTPATIENT)
Dept: PHYSICAL THERAPY | Age: 47
Setting detail: THERAPIES SERIES
Discharge: HOME | End: 2022-05-06
Payer: COMMERCIAL

## 2022-05-06 PROCEDURE — 97140 MANUAL THERAPY 1/> REGIONS: CPT

## 2022-05-06 PROCEDURE — 97110 THERAPEUTIC EXERCISES: CPT

## 2022-05-06 NOTE — PLAN OF CARE
[place] [address] [address]  Phone: [phone] Fax: [phone]        Physical Therapy Re-Certification Plan of Care/MD UPDATE      Dear  Dr. Lilibeth Clark,    We had the pleasure of treating the following patient for physical therapy services at [address] [place] [address]. A summary of our findings can be found in the updated assessment below. This includes our plan of care. If you have any questions or concerns regarding these findings, please do not hesitate to contact me at the office phone number checked above. Thank you for the referral.     Physician Signature:________________________________Date:__________________  By signing above (or electronic signature), therapists plan is approved by physician    Date Range Of Visits: 3/25/22 to 5/6/22  Total Visits to Date: 39 (12 in 2022)  Overall Response to Treatment:   [x]Patient is responding well to treatment and improvement is noted with regards  to goals   []Patient should continue to improve in reasonable time if they continue HEP   []Patient has plateaued and is no longer responding to skilled PT intervention    []Patient is getting worse and would benefit from return to referring MD   []Patient unable to adhere to initial POC   [x]Other: Patient is approximately 6 weeks s/p L shoulder scope with scar debridement on 3/25/22. Patient's L shoulder ROM/mobility is improved compared to pre-op measures, however, she is still demonstrating limitations in end range L shoulder ROM/mobility globally, especially when compared to the mobility achieved immediately post-scope day 1 when the nerve block was still affective in L UE. Patient is also still globally weak in L UE partly secondary to continued limitations in ROM/mobility.  PT focus has been on a combination of aggressive manual techniques and self stretching to address remaining mobility limitations as well as strength progression to address L UE weakness. Needs continued aggressive mobility and strengthening. Follows back up with Dr. Vahe Antunez mid May. Physical Therapy Treatment Note/ Progress Report:     Date:  2022    Patient Name:  Marquis Maciel    :  1975  MRN: [id number]  Restrictions/Precautions:    Medical/Treatment Diagnosis Information:  · Diagnosis: L shoulder arthroscopy, SAD, DCE, shoulder pain M25. 512  · Treatment Diagnosis: L shoulder arthroscopy, SAD, DCE, shoulder pain M25. 357  Insurance/Certification information:     Physician Information:  Referring Practitioner: Dr Sofi Ruano of care signed (Y/N):     Date of Patient follow up with Physician: mid May     Progress Report: [x]  Yes  []  No     Date Range for reporting period:  Beginnin21  Endin22     Progress report due (10 Rx/or 30 days whichever is less):        Recertification due (POC duration/ or 90 days whichever is less):   22    Visit # Insurance Allowable Auth Needed   36   (16 in ) 40 []Yes    []No     Pain level:  3/10 soreness, achiness in L shoulder and soreness in the back     SUBJECTIVE: Does feel like her L shoulder mobility is negatively affected by her work activities. Had the most shoulder ROM and mobility when she took a week long vacation. Returned back to work and lost a bunch of ROM even though she is constantly taking a minute out of work to stretch throughout the day. L shoulder still feels really weak. Sees Dr. Vahe Antunez mid-May. OBJECTIVE: 22   Observation:    Test measurements:    27% disability - [employer]; 44% disability - [employer]     ROM Left (passive)  Post-op day 1 Left (passive)  22 Left (active)  22 Right   Shoulder Flex 160 140 125 170   Shoulder Abd 155 175 140 170   Shoulder ER @ 90 75 40  35 in scapular plane Reaches behind head to C7. 90   Shoulder IR @ 90 65 45 Reaches behind back to L1.  T5                       Strength  Left   Right   Shoulder Flex   5.5# 8.1 lb   Shoulder Scap   5.7# 8.0 lb   Shoulder ER   7.1# 15.7 lb   Shoulder IR   9.4# 20.0 lb       RESTRICTIONS/PRECAUTIONS: L shoulder arthroscopy 11/5/21. L shoulder scope with scar tissue debridement 3/25/22.     Exercises/Interventions:   Therapeutic Ex (16841) Sets/sec Reps CUES/Notes   UBE 4 min1/2 retro   Supine cane AA flexion - supine - varying angle 1 10 Used red tube for active flexion, scaption, abd   Supine wand ER stretch - varying angle 1 10 Used yellow ball for LLLD today   Table Slides - flexion/abduction  1 10 R UE assist   Subscap/lat stretch - hand on mat, backing up for OH flexion stretch 5s 10    Wall slides flexion/abduction/washes 1 10 Wall slide+lift-off per emily   IR towel stretch behind back 10s 10    Doorway ER stretch - 70 deg elevation 10s 10 Corner stretch   Beach chair ER stretch - 2# weight  3-5 min LLLD         Seated shoulder flexion stretch 3 min  Hands on table, scooting stool back   Seated lat stretch 2 min  L hand on table, rotating trunk   Supine SA punch - 5# cane      Active shoulder flexion    Staying within comfortable range   Sidelying shoulder ER      Sidelying shoulder abd to 90deg      Wrist extensor stretch 20sec 4    Prone snow timo  10    Supine snow timo   10 Partial range   TB ext - ecc focus to the point of stretch - Green 2 10    TB adduction - ecc focus to the point of stretch - Green 2 10          Manual Intervention  (45365)       L GH inferior and posterior glides  4 min Gr. II-III   L shoulder PROM - flex, abd, ER/IR  15 min    Prone IR stretch   Good tolerance   STM levator and subscap  HELD 5/9 - very tender/sore today   Sidelying scapular mobs  4 min   Sidelying pin and stretch subscap  3 min    Sidelying posterior capsule stretch  4 min          NMR Re-education (01433)      T-spine Ext      GH depress/compress      Scap/GH NMR      Body blade      Wall ball roll      Wall Ball bounce      Ball drops      Dionne Scap Bio      Floor [place] luh            Therapeutic Activity (25806)      UE throwing porgression      Dynamic UE stability      Earthquake Bar      Bodyblade                  Access Code: [id number]  URL: [email or web address]  Date: 04/01/2022  Prepared by: Liliana Hillman    Exercises  Supine Shoulder Protraction with Dowel - 1-2 x daily - 7 x weekly - 2-3 sets - 10 reps  Supine Shoulder Flexion Extension Full Range AROM - 1-2 x daily - 7 x weekly - 2-3 sets - 10 reps  Sidelying Shoulder External Rotation - 1-2 x daily - 7 x weekly - 2-3 sets - 10 reps  Sidelying Shoulder Abduction Palm Forward - 1-2 x daily - 7 x weekly - 2-3 sets - 10 reps      Therapeutic Exercise and NMR EXR  [x] (38665) Provided verbal/tactile cueing for activities related to strengthening, flexibility, endurance, ROM  for improvements in scapular, scapulothoracic and UE control with self care, reaching, carrying, lifting, house/yardwork, driving/computer work.    [] (51099) Provided verbal/tactile cueing for activities related to improving balance, coordination, kinesthetic sense, posture, motor skill, proprioception  to assist with  scapular, scapulothoracic and UE control with self care, reaching, carrying, lifting, house/yardwork, driving/computer work. Therapeutic Activities:    [] (41697 or 70263) Provided verbal/tactile cueing for activities related to improving balance, coordination, kinesthetic sense, posture, motor skill, proprioception and motor activation to allow for proper function of scapular, scapulothoracic and UE control with self care, carrying, lifting, driving/computer work.      Home Exercise Program:    [x] (66977) Reviewed/Progressed HEP activities related to strengthening, flexibility, endurance, ROM of scapular, scapulothoracic and UE control with self care, reaching, carrying, lifting, house/yardwork, driving/computer work  [] (84066) Reviewed/Progressed HEP activities related to improving balance, coordination, kinesthetic sense, posture, motor skill, proprioception of scapular, scapulothoracic and UE control with self care, reaching, carrying, lifting, house/yardwork, driving/computer work      Manual Treatments:  PROM / STM / Oscillations-Mobs:  G-I, II, III, IV (PA's, Inf., Post.)  [x] (59146) Provided manual therapy to mobilize soft tissue/joints of cervical/CT, scapular GHJ and UE for the purpose of modulating pain, promoting relaxation,  increasing ROM, reducing/eliminating soft tissue swelling/inflammation/restriction, improving soft tissue extensibility and allowing for proper ROM for normal function with self care, reaching, carrying, lifting, house/yardwork, driving/computer work    Modalities:       Charges:  Timed Code Treatment Minutes: 45   Total Treatment Minutes: 45       [] EVAL (LOW) 91911 (typically 20 minutes face-to-face)  [] EVAL (MOD) 55078 (typically 30 minutes face-to-face)  [] EVAL (HIGH) 75618 (typically 45 minutes face-to-face)  [] RE-EVAL     [x] YT(51323) x 1  [] DRY NEEDLE 1 OR 2 MUSCLES  [] NMR (62027) x     [] DRY NEEDLE 3+ MUSCLES  [x] Manual (83528) x 2      [] TA (06486) x     [] Mech Traction (35806)  [] ES(attended) (60631)     [] ES (un) (65818):   [] VASO (37920)  [] Other:     GOALS:  Patient stated goal: work pain free  [x] Progressing: [] Met: [] Not Met: [] Adjusted    Therapist goals for Patient:   Short Term Goals: To be achieved in: 2 weeks  1. Independent in HEP and progression per patient tolerance, in order to prevent re-injury. [] Progressing: [x] Met: [] Not Met: [] Adjusted  2. Patient will have a decrease in pain to facilitate improvement in movement, function, and ADLs as indicated by Functional Deficits. [x] Progressing: [] Met: [] Not Met: [] Adjusted    Long Term Goals: To be achieved in: 10 weeks  1. Disability index score of 10% or less for the Quick DASH to assist with reaching prior level of function.    [x] Progressing: [] Met: [] Not Met: [] Adjusted Comment:  27% disability on [employer] as of 5/6/22  2. Patient will demonstrate increased AROM to [facility] to allow for proper joint functioning as indicated by Functional Deficits. [x] Progressing: [] Met: [] Not Met: [] Adjusted  3. Patient will demonstrate an increase in NM recruitment/activation and overall GH and scapular strength to within n5lbs HHD or WNL for proper functional mobility as indicated by patients Functional Deficits. [x] Progressing: [] Met: [] Not Met: [] Adjusted  4. Patient will return to working out without pain. [x] Progressing: [] Met: [] Not Met: [] Adjusted  5. No pain with sleeping. [x] Progressing: [] Met: [] Not Met: [] Adjusted    ASSESSMENT:   Patient is approximately 6 weeks s/p L shoulder scope with scar debridement on 3/25/22. Patient's L shoulder ROM/mobility is improved compared to pre-op measures, however, she is still demonstrating limitations in end range L shoulder ROM/mobility globally, especially when compared to the mobility achieved immediately post-scope day 1 when the nerve block was still affective in L UE. Patient is also still globally weak in L UE partly secondary to continued limitations in ROM/mobility. PT focus has been on a combination of aggressive manual techniques and self stretching to address remaining mobility limitations as well as strength progression to address L UE weakness. Needs continued aggressive mobility and strengthening. Follows back up with Dr. Jacob Batista mid May.     Return to Play: (if applicable)    []  Stage 1: Intro to Strength   []  Stage 2: Dynamic Strength and Intro to Plyometrics   []  Stage 3: Advanced Plyometrics and Intro to Throwing   []  Stage 4: Sport specific Training/Return to Sport     []  Ready to Return to Play, [employer]   []  Not Ready for Return to Sports   Comments:      Treatment/Activity Tolerance:  [x] Patient tolerated treatment well [] Patient limited by fatique  [] Patient limited by pain  [] Patient limited by other medical complications  [] Other:     Overall Progression Towards Functional goals/ Treatment Progress Update:  [x] Patient is progressing as expected towards functional goals listed. [] Progression is slowed due to complexities/Impairments listed. - presenting like potential frozen shoulder resulting from fall after surgery  [] Progression has been slowed due to co-morbidities. [] Plan just implemented, too soon to assess goals progression <30days   [] Goals require adjustment due to lack of progress  [] Patient is not progressing as expected and requires additional follow up with physician  [] Other    Prognosis for POC: [x] Good [] Fair  [] Poor    Patient requires continued skilled intervention: [x] Yes  [] No      PLAN: Continue PT 2x/week for 4 weeks  [x] Continue per plan of care [] Alter current plan (see comments)  [] Plan of care initiated [] Hold pending MD visit [] Discharge    Electronically signed by: Moe Telles, PT, DPT, MS, SCS    Note: If patient does not return for scheduled/recommended follow up visits, this note will serve as a discharge from care along with the most recent update on progress.

## 2022-05-17 ENCOUNTER — HOSPITAL ENCOUNTER (OUTPATIENT)
Dept: PHYSICAL THERAPY | Age: 47
Setting detail: THERAPIES SERIES
Discharge: HOME | End: 2022-05-17
Payer: COMMERCIAL

## 2022-05-17 PROCEDURE — 97110 THERAPEUTIC EXERCISES: CPT

## 2022-05-17 PROCEDURE — 97140 MANUAL THERAPY 1/> REGIONS: CPT

## 2022-05-17 NOTE — FLOWSHEET NOTE
[place] [address]  Phone: [phone] Fax: [phone]          Physical Therapy Treatment Note/ Progress Report:     Date:  2022    Patient Name:  Jennifer Horan    :  1975  MRN: [id number]  Restrictions/Precautions:    Medical/Treatment Diagnosis Information:  · Diagnosis: L shoulder arthroscopy, SAD, DCE, shoulder pain M25. 512  · Treatment Diagnosis: L shoulder arthroscopy, SAD, DCE, shoulder pain M25. 471  Insurance/Certification information:     Physician Information:  Referring Practitioner: Dr Johnson Headings of care signed (Y/N):     Date of Patient follow up with Physician: mid May     Progress Report: []  Yes  [x]  No     Date Range for reporting period:  Beginnin21  Endin22     Progress report due (10 Rx/or 30 days whichever is less):    54    Recertification due (POC duration/ or 90 days whichever is less):   22    Visit # Insurance Allowable Auth Needed   36   (16 in ) 40 []Yes    []No     Pain level:  3/10 soreness, achiness in L shoulder and soreness in the back     SUBJECTIVE: Pt states last week was hard on the shoulder working 5 days a week without assistance, but feeling better after rest this weekend. 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 Has been using brothers lat pull down to get stretched and working on strength. OBJECTIVE: 22   Observation:    Test measurements:    27% disability - [employer]; 44% disability - [employer] Work     ROM Left (passive)  Post-op day 1 Left (passive)  22 Left (active)  22 Right   Shoulder Flex 160 140 125 170   Shoulder Abd 155 175 140 170   Shoulder ER @ 90 75 40  35 in scapular plane Reaches behind head to C7. 90   Shoulder IR @ 90 65 45 Reaches behind back to L1.  T5                       Strength  Left   Right   Shoulder Flex   5.5# 8.1 lb Shoulder Scap   5.7# 8.0 lb   Shoulder ER   7.1# 15.7 lb   Shoulder IR   9.4# 20.0 lb       RESTRICTIONS/PRECAUTIONS: L shoulder arthroscopy 11/5/21. L shoulder scope with scar tissue debridement 3/25/22.     Exercises/Interventions:   Therapeutic Ex (27914) Sets/sec Reps CUES/Notes   UBE 4 min1/2 retro   Supine cane AA flexion - supine - varying angle 1 10 Used red tube for active flexion, scaption, abd   Supine wand ER stretch - varying angle 1 10 Used yellow ball for LLLD today   Table Slides - flexion/abduction  1 10 R UE assist   Subscap/lat stretch - hand on mat, backing up for OH flexion stretch 5s 10    Wall slides flexion/abduction/washes 1 10 Wall slide+lift-off per emily   IR towel stretch behind back 10s 10    Doorway ER stretch - 70 deg elevation 10s 10 Corner stretch   Beach chair ER stretch - 2# weight  3-5 min LLLD         Seated shoulder flexion stretch 3 min  Hands on table, scooting stool back   Seated lat stretch 2 min  L hand on table, rotating trunk   Supine SA punch - 5# cane      Active shoulder flexion    Staying within comfortable range   Sidelying shoulder ER      Sidelying shoulder abd to 90deg      Wrist extensor stretch 20sec 4    Prone snow timo  10    Supine snow timo   10 Partial range   TB ext - ecc focus to the point of stretch - Green 2 10    TB adduction - ecc focus to the point of stretch - Green 2 10          Manual Intervention  (16721)       L GH inferior and posterior glides  4 min Gr. II-III   L shoulder PROM - flex, abd, ER/IR  15 min    Prone IR stretch   Good tolerance   STM levator and subscap  HELD 5/9 - very tender/sore today   Sidelying scapular mobs  4 min   Sidelying pin and stretch subscap  3 min    Sidelying posterior capsule stretch  4 min          NMR Re-education (57164)      T-spine Ext      GH depress/compress      Scap/GH NMR      Body blade      Wall ball roll      Wall Ball bounce      Ball drops      Dionne Scap Bio      Floor Snow angels-sliders Therapeutic Activity (94682)      UE throwing porgression      Dynamic UE stability      Earthquake Bar      Bodyblade                  Access Code: [id number]  URL: [email or web address]  Date: 04/01/2022  Prepared by: Ailyn Garvin    Exercises  Supine Shoulder Protraction with Dowel - 1-2 x daily - 7 x weekly - 2-3 sets - 10 reps  Supine Shoulder Flexion Extension Full Range AROM - 1-2 x daily - 7 x weekly - 2-3 sets - 10 reps  Sidelying Shoulder External Rotation - 1-2 x daily - 7 x weekly - 2-3 sets - 10 reps  Sidelying Shoulder Abduction Palm Forward - 1-2 x daily - 7 x weekly - 2-3 sets - 10 reps      Therapeutic Exercise and NMR EXR  [x] (04713) Provided verbal/tactile cueing for activities related to strengthening, flexibility, endurance, ROM  for improvements in scapular, scapulothoracic and UE control with self care, reaching, carrying, lifting, house/yardwork, driving/computer work.    [] (39316) Provided verbal/tactile cueing for activities related to improving balance, coordination, kinesthetic sense, posture, motor skill, proprioception  to assist with  scapular, scapulothoracic and UE control with self care, reaching, carrying, lifting, house/yardwork, driving/computer work. Therapeutic Activities:    [] (82383 or 30464) Provided verbal/tactile cueing for activities related to improving balance, coordination, kinesthetic sense, posture, motor skill, proprioception and motor activation to allow for proper function of scapular, scapulothoracic and UE control with self care, carrying, lifting, driving/computer work.      Home Exercise Program:    [x] (88358) Reviewed/Progressed HEP activities related to strengthening, flexibility, endurance, ROM of scapular, scapulothoracic and UE control with self care, reaching, carrying, lifting, house/yardwork, driving/computer work  [] (41974) Reviewed/Progressed HEP activities related to improving balance, coordination, kinesthetic sense, posture, motor skill, proprioception of scapular, scapulothoracic and UE control with self care, reaching, carrying, lifting, house/yardwork, driving/computer work      Manual Treatments:  PROM / STM / Oscillations-Mobs:  G-I, II, III, IV ([place], Inf., Post.)  [x] (54496) Provided manual therapy to mobilize soft tissue/joints of cervical/CT, scapular GHJ and UE for the purpose of modulating pain, promoting relaxation,  increasing ROM, reducing/eliminating soft tissue swelling/inflammation/restriction, improving soft tissue extensibility and allowing for proper ROM for normal function with self care, reaching, carrying, lifting, house/yardwork, driving/computer work    Modalities:       Charges:  Timed Code Treatment Minutes: 45   Total Treatment Minutes: 45       [] EVAL (LOW) 62460 (typically 20 minutes face-to-face)  [] EVAL (MOD) 40983 (typically 30 minutes face-to-face)  [] EVAL (HIGH) 73943 (typically 45 minutes face-to-face)  [] RE-EVAL     [x] JY(30014) x 1  [] DRY NEEDLE 1 OR 2 MUSCLES  [] NMR (39206) x     [] DRY NEEDLE 3+ MUSCLES  [x] Manual (05049) x 2      [] TA (86531) x     [] Mech Traction (99747)  [] ES(attended) (78582)     [] ES (un) (81141):   [] VASO (61658)  [] Other:     GOALS:  Patient stated goal: work pain free  [x] Progressing: [] Met: [] Not Met: [] Adjusted    Therapist goals for Patient:   Short Term Goals: To be achieved in: 2 weeks  1. Independent in HEP and progression per patient tolerance, in order to prevent re-injury. [] Progressing: [x] Met: [] Not Met: [] Adjusted  2. Patient will have a decrease in pain to facilitate improvement in movement, function, and ADLs as indicated by Functional Deficits. [x] Progressing: [] Met: [] Not Met: [] Adjusted    Long Term Goals: To be achieved in: 10 weeks  1. Disability index score of 10% or less for the Quick DASH to assist with reaching prior level of function.    [x] Progressing: [] Met: [] Not Met: [] Adjusted Comment:  27% disability on Quick Dash as of 5/6/22  2. Patient will demonstrate increased AROM to [facility] to allow for proper joint functioning as indicated by Functional Deficits. [x] Progressing: [] Met: [] Not Met: [] Adjusted  3. Patient will demonstrate an increase in NM recruitment/activation and overall GH and scapular strength to within n5lbs HHD or WNL for proper functional mobility as indicated by patients Functional Deficits. [x] Progressing: [] Met: [] Not Met: [] Adjusted  4. Patient will return to working out without pain. [x] Progressing: [] Met: [] Not Met: [] Adjusted  5. No pain with sleeping. [x] Progressing: [] Met: [] Not Met: [] Adjusted    ASSESSMENT:   Patient is approximately 7 weeks s/p L shoulder scope with scar debridement on 3/25/22. Patient's L shoulder ROM/mobility is improved compared to pre-op measures, however, she is still demonstrating limitations in end range L shoulder ROM/mobility globally, especially when compared to the mobility achieved immediately post-scope day 1 when the nerve block was still affective in L UE. Patient is also still globally weak in L UE partly secondary to continued limitations in ROM/mobility. PT focus has been on a combination of aggressive manual techniques and self stretching to address remaining mobility limitations as well as strength progression to address L UE weakness. Needs continued aggressive mobility and strengthening. Follows back up with Dr. Chaparro Roberson mid May. Held strengthening this morning as patient has full day of work and typically feels worse end of day due to fatigue.     Return to Play: (if applicable)    []  Stage 1: Intro to Strength   []  Stage 2: Dynamic Strength and Intro to Plyometrics   []  Stage 3: Advanced Plyometrics and Intro to Throwing   []  Stage 4: Sport specific Training/Return to Sport     []  Ready to Return to Play, [employer] Stages   []  Not Ready for Return to Sports   Comments:      Treatment/Activity Tolerance:  [x] Patient tolerated treatment well [] Patient limited by fatique  [] Patient limited by pain  [] Patient limited by other medical complications  [] Other:     Overall Progression Towards Functional goals/ Treatment Progress Update:  [x] Patient is progressing as expected towards functional goals listed. [] Progression is slowed due to complexities/Impairments listed. - presenting like potential frozen shoulder resulting from fall after surgery  [] Progression has been slowed due to co-morbidities. [] Plan just implemented, too soon to assess goals progression <30days   [] Goals require adjustment due to lack of progress  [] Patient is not progressing as expected and requires additional follow up with physician  [] Other    Prognosis for POC: [x] Good [] Fair  [] Poor    Patient requires continued skilled intervention: [x] Yes  [] No      PLAN: Continue PT 2x/week for 4 weeks  [x] Continue per plan of care [] Alter current plan (see comments)  [] Plan of care initiated [] Hold pending MD visit [] Discharge    Electronically signed by: Francois Carpio PT, DPT    Note: If patient does not return for scheduled/recommended follow up visits, this note will serve as a discharge from care along with the most recent update on progress.

## 2022-05-20 ENCOUNTER — HOSPITAL ENCOUNTER (OUTPATIENT)
Dept: PHYSICAL THERAPY | Age: 47
Setting detail: THERAPIES SERIES
Discharge: HOME | End: 2022-05-20
Payer: COMMERCIAL

## 2022-05-20 PROCEDURE — 97140 MANUAL THERAPY 1/> REGIONS: CPT

## 2022-05-20 PROCEDURE — 97110 THERAPEUTIC EXERCISES: CPT

## 2022-05-20 NOTE — FLOWSHEET NOTE
[place] [address] [address]  Phone: [phone] Fax: [phone]          Physical Therapy Treatment Note/ Progress Report:     Date:  2022    Patient Name:  Deepak Herr    :  1975  MRN: [id number]  Restrictions/Precautions:    Medical/Treatment Diagnosis Information:  · Diagnosis: L shoulder arthroscopy, SAD, DCE, shoulder pain M25. 512  · Treatment Diagnosis: L shoulder arthroscopy, SAD, DCE, shoulder pain M25. 411  Insurance/Certification information:     Physician Information:  Referring Practitioner: Dr Brandon Guadalupe of care signed (Y/N):     Date of Patient follow up with Physician: mid May     Progress Report: []  Yes  [x]  No     Date Range for reporting period:  Beginnin21  Endin22     Progress report due (10 Rx/or 30 days whichever is less):        Recertification due (POC duration/ or 90 days whichever is less):   22    Visit # Insurance Allowable Auth Needed   37   (17 in ) 40 []Yes    []No     Pain level:  3/10 soreness, achiness in L shoulder and soreness in the back     SUBJECTIVE:  Saw Dr. Sara Ahumada for follow up at the beginning of the week. Dr. Sara Ahumada was happy with patient's progress. Thinks patient will look really good with 8 more weeks of aggressive stretching and strengthening. OBJECTIVE: 22   Observation:    Test measurements:    27% disability - Quick Dash; 44% disability - [employer]     ROM Left (passive)  Post-op day 1 Left (passive)  22 Left (active)  22 Right   Shoulder Flex 160 140 125 170   Shoulder Abd 155 175 140 170   Shoulder ER @ 90 75 40  35 in scapular plane Reaches behind head to C7. 90   Shoulder IR @ 90 65 45 Reaches behind back to L1.  T5                       Strength  Left   Right   Shoulder Flex   5.5# 8.1 lb   Shoulder Scap   5.7# 8.0 lb   Shoulder ER   7.1# 15.7 lb   Shoulder IR   9.4# 20.0 lb RESTRICTIONS/PRECAUTIONS: L shoulder arthroscopy 11/5/21. L shoulder scope with scar tissue debridement 3/25/22. Exercises/Interventions:   Therapeutic Ex (73103) Sets/sec Reps CUES/Notes   UBE 4 min1/2 retro   Supine cane AA flexion - supine - varying angle 1 10 Used red tube for active flexion, scaption, abd   Supine wand ER stretch - varying angle 1 10 Used yellow ball for LLLD today   Table Slides - flexion/abduction  1 10 R UE assist   Subscap/lat stretch - hand on mat, backing up for OH flexion stretch 5s 10    Wall slides flexion/abduction/washes 1 10 Wall slide+lift-off per emily   IR towel stretch behind back 10s 10    Doorway ER stretch - 70 deg elevation 10s 10 Corner stretch   Beach chair ER stretch - 2# weight  3-5 min LLLD   Wall walk flexion w/45 deg rot away  10s 10 Targeting posterior capsule restrictions   Cross body adduction stretch - scapular blocked on wall, straight arm 20s 5          Seated shoulder flexion stretch 3 min  Hands on table, scooting stool back   Seated lat stretch 2 min  L hand on table, rotating trunk   Supine SA punch - 5# cane      Active shoulder flexion    Staying within comfortable range   Sidelying shoulder ER      Sidelying shoulder abd to 90deg      Wrist extensor stretch 20sec 4    Prone snow timo  10    Supine snow timo   10 Partial range   TB ext - ecc focus to the point of stretch - Green 2 10    TB adduction - ecc focus to the point of stretch - Green 2 10    Supine ecc abd - 2# 1 15          Manual Intervention  (57452)       L GH inferior and posterior glides - supine and sidelying  8 min Gr. II-III   L shoulder PROM - flex, abd, ER/IR  12 min    Prone IR stretch   Good tolerance   STM levator and subscap  HELD 5/9 - very tender/sore today   Sidelying scapular mobs  4 minGr.  III-IV   Sidelying pin and stretch subscap  3 min    Sidelying, prone posterior capsule stretch  4 min Gr. III-IV   Supine beach chair ER mobilization  4 min Gr. III-IV         NMR Re-education (92155)      T-spine Ext      GH depress/compress      Scap/GH NMR      Body blade      Wall ball roll      Wall Ball bounce      Ball drops      Dionne Scap Bio      Floor Snow angels-sliders            Therapeutic Activity (68299)      UE throwing porgression      Dynamic UE stability      Earthquake Bar      Bodyblade                  Access Code: [email or web address]  URL: [email or web address]  Date: 04/01/2022  Prepared by: Delbert Feast    Exercises  Supine Shoulder Protraction with Dowel - 1-2 x daily - 7 x weekly - 2-3 sets - 10 reps  Supine Shoulder Flexion Extension Full Range AROM - 1-2 x daily - 7 x weekly - 2-3 sets - 10 reps  Sidelying Shoulder External Rotation - 1-2 x daily - 7 x weekly - 2-3 sets - 10 reps  Sidelying Shoulder Abduction Palm Forward - 1-2 x daily - 7 x weekly - 2-3 sets - 10 reps      Therapeutic Exercise and NMR EXR  [x] (50278) Provided verbal/tactile cueing for activities related to strengthening, flexibility, endurance, ROM  for improvements in scapular, scapulothoracic and UE control with self care, reaching, carrying, lifting, house/yardwork, driving/computer work.    [] (89088) Provided verbal/tactile cueing for activities related to improving balance, coordination, kinesthetic sense, posture, motor skill, proprioception  to assist with  scapular, scapulothoracic and UE control with self care, reaching, carrying, lifting, house/yardwork, driving/computer work. Therapeutic Activities:    [] (39434 or 81697) Provided verbal/tactile cueing for activities related to improving balance, coordination, kinesthetic sense, posture, motor skill, proprioception and motor activation to allow for proper function of scapular, scapulothoracic and UE control with self care, carrying, lifting, driving/computer work.      Home Exercise Program:    [x] (68150) Reviewed/Progressed HEP activities related to strengthening, flexibility, endurance, ROM of scapular, scapulothoracic and UE control with self care, reaching, carrying, lifting, house/yardwork, driving/computer work  [] (73576) Reviewed/Progressed HEP activities related to improving balance, coordination, kinesthetic sense, posture, motor skill, proprioception of scapular, scapulothoracic and UE control with self care, reaching, carrying, lifting, house/yardwork, driving/computer work      Manual Treatments:  PROM / STM / Oscillations-Mobs:  G-I, II, III, IV (PA's, Inf., Post.)  [x] (37759) Provided manual therapy to mobilize soft tissue/joints of cervical/CT, scapular GHJ and UE for the purpose of modulating pain, promoting relaxation,  increasing ROM, reducing/eliminating soft tissue swelling/inflammation/restriction, improving soft tissue extensibility and allowing for proper ROM for normal function with self care, reaching, carrying, lifting, house/yardwork, driving/computer work    Modalities:       Charges:  Timed Code Treatment Minutes: 45   Total Treatment Minutes: 45       [] EVAL (LOW) 01481 (typically 20 minutes face-to-face)  [] EVAL (MOD) 75434 (typically 30 minutes face-to-face)  [] EVAL (HIGH) 423 8935 (typically 45 minutes face-to-face)  [] RE-EVAL     [x] PS(62132) x 1  [] DRY NEEDLE 1 OR 2 MUSCLES  [] NMR (03364) x     [] DRY NEEDLE 3+ MUSCLES  [x] Manual (47470) x 2      [] TA (98218) x     [] [facility] (19881)  [] ES(attended) (32842)     [] ES (un) (72242):   [] VASO (33851)  [] Other:     GOALS:  Patient stated goal: work pain free  [x] Progressing: [] Met: [] Not Met: [] Adjusted    Therapist goals for Patient:   Short Term Goals: To be achieved in: 2 weeks  1. Independent in HEP and progression per patient tolerance, in order to prevent re-injury. [] Progressing: [x] Met: [] Not Met: [] Adjusted  2. Patient will have a decrease in pain to facilitate improvement in movement, function, and ADLs as indicated by Functional Deficits. [x] Progressing: [] Met: [] Not Met: [] Adjusted    Long Term Goals:  To be achieved in: 10 weeks  1. Disability index score of 10% or less for the Quick DASH to assist with reaching prior level of function. [x] Progressing: [] Met: [] Not Met: [] Adjusted Comment:  27% disability on Quick Dash as of 5/6/22  2. Patient will demonstrate increased AROM to [facility] to allow for proper joint functioning as indicated by Functional Deficits. [x] Progressing: [] Met: [] Not Met: [] Adjusted  3. Patient will demonstrate an increase in NM recruitment/activation and overall GH and scapular strength to within n5lbs HHD or WNL for proper functional mobility as indicated by patients Functional Deficits. [x] Progressing: [] Met: [] Not Met: [] Adjusted  4. Patient will return to working out without pain. [x] Progressing: [] Met: [] Not Met: [] Adjusted  5. No pain with sleeping. [x] Progressing: [] Met: [] Not Met: [] Adjusted    ASSESSMENT:   Active OH flexion looking better. Still restricted primarily at end range. Also limited, restricted with end range ER behind head and IR behind back. Definitely has restrictions still present in posterior [address] joint capsule mobility. Primary manual techniques and self stretching focus on addressing posterior GH joint capsule restrictions today. Encouraged consistent performance of these specific stretches outside of PT several times a day to improve carryover in capsule mobility and address those remaining end range motion restrictions outside of PT.      Return to Play: (if applicable)    []  Stage 1: Intro to Strength   []  Stage 2: Dynamic Strength and Intro to Plyometrics   []  Stage 3: Advanced Plyometrics and Intro to Throwing   []  Stage 4: Sport specific Training/Return to Sport     []  Ready to Return to Play, [employer]   []  Not Ready for Return to Sports   Comments:      Treatment/Activity Tolerance:  [x] Patient tolerated treatment well [] Patient limited by fatique  [] Patient limited by pain  [] Patient limited by other medical complications  [] Other:     Overall Progression Towards Functional goals/ Treatment Progress Update:  [x] Patient is progressing as expected towards functional goals listed. [] Progression is slowed due to complexities/Impairments listed. - presenting like potential frozen shoulder resulting from fall after surgery  [] Progression has been slowed due to co-morbidities. [] Plan just implemented, too soon to assess goals progression <30days   [] Goals require adjustment due to lack of progress  [] Patient is not progressing as expected and requires additional follow up with physician  [] Other    Prognosis for POC: [x] Good [] Fair  [] Poor    Patient requires continued skilled intervention: [x] Yes  [] No      PLAN: Continue PT 2x/week for 4 weeks  [x] Continue per plan of care [] Alter current plan (see comments)  [] Plan of care initiated [] Hold pending MD visit [] Discharge    Electronically signed by: Zhang Neal PT, DPT, MS, SCS    Note: If patient does not return for scheduled/recommended follow up visits, this note will serve as a discharge from care along with the most recent update on progress.

## 2022-05-27 ENCOUNTER — HOSPITAL ENCOUNTER (OUTPATIENT)
Dept: PHYSICAL THERAPY | Age: 47
Setting detail: THERAPIES SERIES
Discharge: HOME | End: 2022-05-27
Payer: COMMERCIAL

## 2022-05-27 PROCEDURE — 97140 MANUAL THERAPY 1/> REGIONS: CPT

## 2022-05-27 PROCEDURE — 97110 THERAPEUTIC EXERCISES: CPT

## 2022-05-27 NOTE — FLOWSHEET NOTE
[place] [address]  Phone: [phone] Fax: [phone]          Physical Therapy Treatment Note/ Progress Report:     Date:  2022    Patient Name:  Tio Kovacs    :  1975  MRN: [id number]  Restrictions/Precautions:    Medical/Treatment Diagnosis Information:  · Diagnosis: L shoulder arthroscopy, SAD, DCE, shoulder pain M25. 512  · Treatment Diagnosis: L shoulder arthroscopy, SAD, DCE, shoulder pain M25. 516  Insurance/Certification information:     Physician Information:  Referring Practitioner: Dr Tobin Alvarez of care signed (Y/N):     Date of Patient follow up with Physician: mid May     Progress Report: []  Yes  [x]  No     Date Range for reporting period:  Beginnin21  Endin22     Progress report due (10 Rx/or 30 days whichever is less):    96    Recertification due (POC duration/ or 90 days whichever is less):   22    Visit # Insurance Allowable Auth Needed   38   (18 in ) 40 []Yes    []No     Pain level:  3/10 soreness, achiness in L shoulder and soreness in the back     SUBJECTIVE:  Consistently performing adjusted stretches throughout her day at work. Feels like it has made a big difference in her ability to reach all the way up over her head and behind her back. OBJECTIVE: 22   Observation:    Test measurements:    27% disability - [employer]; 44% disability - [employer]     ROM Left (passive)  Post-op day 1 Left (passive)  22 Left (active)  22 Right   Shoulder Flex 160 140 125 170   Shoulder Abd 155 175 140 170   Shoulder ER @ 90 75 40  35 in scapular plane Reaches behind head to C7. 90   Shoulder IR @ 90 65 45 Reaches behind back to L1.  T5                       Strength  Left   Right   Shoulder Flex   5.5# 8.1 lb   Shoulder Scap   5.7# 8.0 lb   Shoulder ER   7.1# 15.7 lb   Shoulder IR   9.4# 20.0 lb       RESTRICTIONS/PRECAUTIONS: L shoulder arthroscopy 11/5/21. L shoulder scope with scar tissue debridement 3/25/22. Exercises/Interventions:   Therapeutic Ex (25461) Sets/sec Reps CUES/Notes   UBE 4 min1/2 retro   Supine cane AA flexion - supine - varying angle 1 10 Used red tube for active flexion, scaption, abd   Supine wand ER stretch - varying angle 1 10 Used yellow ball for LLLD today   Table Slides - flexion/abduction  1 10 R UE assist   Subscap/lat stretch - hand on mat, backing up for OH flexion stretch 5s 10    Wall slides flexion/abduction/washes 1 10 Wall slide+lift-off per emily   IR towel stretch behind back 10s 10    Doorway ER stretch - 70 deg elevation 10s 10 Corner stretch   Beach chair ER stretch - 2# weight  3-5 min LLLD   Wall walk flexion w/45 deg rot away  10s 10 Targeting posterior capsule restrictions   Cross body adduction stretch - scapular blocked on wall, straight arm 20s 5          Seated shoulder flexion stretch 3 min  Hands on table, scooting stool back   Seated lat stretch 2 min  L hand on table, rotating trunk   Supine SA punch - 5# cane      Active shoulder flexion    Staying within comfortable range   Sidelying shoulder ER      Sidelying shoulder abd to 90deg      Wrist extensor stretch 20sec 4    Prone snow timo  10    Supine snow timo   10 Partial range   TB ext - ecc focus to the point of stretch - Green 2 10    TB adduction - ecc focus to the point of stretch - Green 2 10    Supine ecc abd - 2# 1 15          Manual Intervention  (84816)       L GH inferior and posterior glides - supine and sidelying  8 min Gr. II-III   L shoulder PROM - flex, abd, ER/IR  12 min    Prone IR stretch   Good tolerance   STM levator and subscap  HELD 5/9 - very tender/sore today   Sidelying scapular mobs  4 minGr.  III-IV   Sidelying pin and stretch subscap  3 min    Sidelying, prone posterior capsule stretch  4 min Gr. III-IV   Supine beach chair ER mobilization  4 min Gr. III-IV         NMR Re-education (74845)      T-spine Ext [address] depress/compress      [address] NMR      Body blade      Wall ball roll      Wall Ball bounce      Ball drops      Dionne Scap Bio      Floor Snow angels-sliders            Therapeutic Activity (09534)      UE throwing porgression      Dynamic UE stability      Earthquake Bar      Bodyblade                  Access Code: [email or web address]  URL: [email or web address]  Date: 04/01/2022  Prepared by: Mere Naranjo    Exercises  Supine Shoulder Protraction with Dowel - 1-2 x daily - 7 x weekly - 2-3 sets - 10 reps  Supine Shoulder Flexion Extension Full Range AROM - 1-2 x daily - 7 x weekly - 2-3 sets - 10 reps  Sidelying Shoulder External Rotation - 1-2 x daily - 7 x weekly - 2-3 sets - 10 reps  Sidelying Shoulder Abduction Palm Forward - 1-2 x daily - 7 x weekly - 2-3 sets - 10 reps      Therapeutic Exercise and NMR EXR  [x] (17512) Provided verbal/tactile cueing for activities related to strengthening, flexibility, endurance, ROM  for improvements in scapular, scapulothoracic and UE control with self care, reaching, carrying, lifting, house/yardwork, driving/computer work.    [] (68210) Provided verbal/tactile cueing for activities related to improving balance, coordination, kinesthetic sense, posture, motor skill, proprioception  to assist with  scapular, scapulothoracic and UE control with self care, reaching, carrying, lifting, house/yardwork, driving/computer work. Therapeutic Activities:    [] (63560 or 50474) Provided verbal/tactile cueing for activities related to improving balance, coordination, kinesthetic sense, posture, motor skill, proprioception and motor activation to allow for proper function of scapular, scapulothoracic and UE control with self care, carrying, lifting, driving/computer work.      Home Exercise Program:    [x] (18077) Reviewed/Progressed HEP activities related to strengthening, flexibility, endurance, ROM of scapular, scapulothoracic and UE control with self care, reaching, carrying, lifting, house/yardwork, driving/computer work  [] (15897) Reviewed/Progressed HEP activities related to improving balance, coordination, kinesthetic sense, posture, motor skill, proprioception of scapular, scapulothoracic and UE control with self care, reaching, carrying, lifting, house/yardwork, driving/computer work      Manual Treatments:  PROM / STM / Oscillations-Mobs:  G-I, II, III, IV (PA's, Inf., Post.)  [x] (83134) Provided manual therapy to mobilize soft tissue/joints of cervical/CT, scapular GHJ and UE for the purpose of modulating pain, promoting relaxation,  increasing ROM, reducing/eliminating soft tissue swelling/inflammation/restriction, improving soft tissue extensibility and allowing for proper ROM for normal function with self care, reaching, carrying, lifting, house/yardwork, driving/computer work    Modalities:       Charges:  Timed Code Treatment Minutes: 45   Total Treatment Minutes: 45       [] EVAL (LOW) 09689 (typically 20 minutes face-to-face)  [] EVAL (MOD) 22398 (typically 30 minutes face-to-face)  [] EVAL (HIGH) 423 8935 (typically 45 minutes face-to-face)  [] RE-EVAL     [x] [place](68928) x 1  [] DRY NEEDLE 1 OR 2 MUSCLES  [] NMR (35944) x     [] DRY NEEDLE 3+ MUSCLES  [x] Manual (29641) x 2      [] TA (79452) x     [] Mech Traction (45028)  [] ES(attended) (12818)     [] ES (un) (60852):   [] VASO (05785)  [] Other:     GOALS:  Patient stated goal: work pain free  [x] Progressing: [] Met: [] Not Met: [] Adjusted    Therapist goals for Patient:   Short Term Goals: To be achieved in: 2 weeks  1. Independent in HEP and progression per patient tolerance, in order to prevent re-injury. [] Progressing: [x] Met: [] Not Met: [] Adjusted  2. Patient will have a decrease in pain to facilitate improvement in movement, function, and ADLs as indicated by Functional Deficits. [x] Progressing: [] Met: [] Not Met: [] Adjusted    Long Term Goals: To be achieved in: 10 weeks  1.  Disability index score of 10% or less for the Quick DASH to assist with reaching prior level of function. [x] Progressing: [] Met: [] Not Met: [] Adjusted Comment:  27% disability on Quick Dash as of 5/6/22  2. Patient will demonstrate [facility] to allow for proper joint functioning as indicated by Functional Deficits. [x] Progressing: [] Met: [] Not Met: [] Adjusted  3. Patient will demonstrate an increase in NM recruitment/activation and overall GH and scapular strength to within n5lbs HHD or WNL for proper functional mobility as indicated by patients Functional Deficits. [x] Progressing: [] Met: [] Not Met: [] Adjusted  4. Patient will return to working out without pain. [x] Progressing: [] Met: [] Not Met: [] Adjusted  5. No pain with sleeping. [x] Progressing: [] Met: [] Not Met: [] Adjusted    ASSESSMENT:   Active OH flexion looking better. Patient performing adjusted wall walks and cross body adduction stretch with scapula blocked on wall frequently throughout her day at work. Feels like it has made a big difference in her end range OH mobility. Continued session focus primarily on manual techniques and self stretching addressing posterior GH joint capsule restrictions again today due to positive response in end range OH mobility, and IR/ER mobility after last session.      Return to Play: (if applicable)    []  Stage 1: Intro to Strength   []  Stage 2: Dynamic Strength and Intro to Plyometrics   []  Stage 3: Advanced Plyometrics and Intro to Throwing   []  Stage 4: Sport specific Training/Return to Sport     []  Ready to Return to Play, [employer]   []  Not Ready for Return to Sports   Comments:      Treatment/Activity Tolerance:  [x] Patient tolerated treatment well [] Patient limited by fatique  [] Patient limited by pain  [] Patient limited by other medical complications  [] Other:     Overall Progression Towards Functional goals/ Treatment Progress Update:  [x] Patient is progressing as expected towards functional goals listed. [] Progression is slowed due to complexities/Impairments listed. - presenting like potential frozen shoulder resulting from fall after surgery  [] Progression has been slowed due to co-morbidities. [] Plan just implemented, too soon to assess goals progression <30days   [] Goals require adjustment due to lack of progress  [] Patient is not progressing as expected and requires additional follow up with physician  [] Other    Prognosis for POC: [x] Good [] Fair  [] Poor    Patient requires continued skilled intervention: [x] Yes  [] No      PLAN: Continue PT 2x/week for 4 weeks  [x] Continue per plan of care [] Alter current plan (see comments)  [] Plan of care initiated [] Hold pending MD visit [] Discharge    Electronically signed by: Geneva Meeks, PT, DPT, MS, SCS    Note: If patient does not return for scheduled/recommended follow up visits, this note will serve as a discharge from care along with the most recent update on progress.

## 2022-05-31 ENCOUNTER — HOSPITAL ENCOUNTER (OUTPATIENT)
Dept: PHYSICAL THERAPY | Age: 47
Setting detail: THERAPIES SERIES
Discharge: HOME | End: 2022-05-31
Payer: COMMERCIAL

## 2022-05-31 PROCEDURE — 97110 THERAPEUTIC EXERCISES: CPT

## 2022-05-31 PROCEDURE — 97140 MANUAL THERAPY 1/> REGIONS: CPT

## 2022-05-31 NOTE — FLOWSHEET NOTE
[place] [address] [address]  Phone: [phone] Fax: [phone]          Physical Therapy Treatment Note/ Progress Report:     Date:  2022    Patient Name:  Leslie Kim    :  1975  MRN: [id number]  Restrictions/Precautions:    Medical/Treatment Diagnosis Information:  · Diagnosis: L shoulder arthroscopy, SAD, DCE, shoulder pain M25. 512  · Treatment Diagnosis: L shoulder arthroscopy, SAD, DCE, shoulder pain M25. 384  Insurance/Certification information:     Physician Information:  Referring Practitioner: Dr Kaelyn Laboy of care signed (Y/N):     Date of Patient follow up with Physician: mid May     Progress Report: []  Yes  [x]  No     Date Range for reporting period:  Beginnin21  Endin22     Progress report due (10 Rx/or 30 days whichever is less):    29    Recertification due (POC duration/ or 90 days whichever is less):   22    Visit # Insurance Allowable Auth Needed   39  (19 in ) 40 []Yes    []No     Pain level:  3/10 soreness, achiness in L shoulder and soreness in the back     SUBJECTIVE:  Feels like she has seen good improvement in her ability to reach up over her head with her L arm with current activities being performed in PT.     OBJECTIVE: 22   Observation:    Test measurements:    27% disability - Quick Dash; 44% disability - [employer]     ROM Left (passive)  Post-op day 1 Left (passive)  22 Left (active)  22 Right   Shoulder Flex 160 140 125 170   Shoulder Abd 155 175 140 170   Shoulder ER @ 90 75 40  35 in scapular plane Reaches behind head to C7. 90   Shoulder IR @ 90 65 45 Reaches behind back to L1. T5                       Strength  Left   Right   Shoulder Flex   5.5# 8.1 lb   Shoulder Scap   5.7# 8.0 lb   Shoulder ER   7.1# 15.7 lb   Shoulder IR   9.4# 20.0 lb       RESTRICTIONS/PRECAUTIONS: L shoulder arthroscopy 21.  L shoulder scope with scar tissue debridement 3/25/22. Exercises/Interventions:   Therapeutic Ex (24443) Sets/sec Reps CUES/Notes   UBE 4 min1/2 retro   Supine cane AA flexion - supine - varying angle 1 10 Used red tube for active flexion, scaption, abd   Supine wand ER stretch - varying angle 1 10 Used yellow ball for LLLD today   Table Slides - flexion/abduction  1 10 R UE assist   Subscap/lat stretch - hand on mat, backing up for OH flexion stretch 5s 10    Wall slides flexion/abduction/washes 1 10 Wall slide+lift-off per eimly   IR towel stretch behind back 10s 10    Doorway ER stretch - 70 deg elevation 10s 10 Corner stretch   Beach chair ER stretch - 2# weight  3-5 min LLLD   Wall walk flexion w/45 deg rot away  10s 10 Targeting posterior capsule restrictions   Cross body adduction stretch - scapular blocked on wall, straight arm 20s 5    Sidelying open book - hand in beach chair position for posterior capsule stretch 15s 10 R sidelying   IR stretch behind back w/strap 10s 10 Ant shoulder blocked against wall         Seated shoulder flexion stretch 3 min  Hands on table, scooting stool back   Seated lat stretch 2 min  L hand on table, rotating trunk   Supine SA punch - 5# cane      Active shoulder flexion    Staying within comfortable range   Sidelying shoulder ER      Sidelying shoulder abd to 90deg      Wrist extensor stretch 20sec 4    Prone snow timo  10    Supine snow timo   10 Partial range   TB ext - ecc focus to the point of stretch - Green 2 10    TB adduction - ecc focus to the point of stretch - Green 2 10    Supine ecc abd - 3# 1 15          Manual Intervention  (68690)       L GH inferior and posterior glides - supine and sidelying  8 min Gr. II-III   L shoulder PROM - flex, abd, ER/IR  12 min    Prone IR stretch   Good tolerance   STM levator and subscap  HELD 5/9 - very tender/sore today   Sidelying scapular mobs  4 minGr.  III-IV   Sidelying pin and stretch subscap  3 min    Sidelying, prone posterior capsule stretch  4 min Gr. III-IV   Supine beach chair ER mobilization  4 min Gr. III-IV         NMR Re-education (64703)      T-spine Ext      GH depress/compress      Scap/GH NMR      Body blade      Wall ball roll      Wall Ball bounce      Ball drops      Dionne Scap Bio      Floor Snow angels-sliders            Therapeutic Activity (48063)      UE throwing porgression      Dynamic UE stability      Earthquake Bar      Bodyblade                  Access Code: [email or web address]  URL: [email or web address]  Date: 04/01/2022  Prepared by: Penny Castillo    Exercises  Supine Shoulder Protraction with Dowel - 1-2 x daily - 7 x weekly - 2-3 sets - 10 reps  Supine Shoulder Flexion Extension Full Range AROM - 1-2 x daily - 7 x weekly - 2-3 sets - 10 reps  Sidelying Shoulder External Rotation - 1-2 x daily - 7 x weekly - 2-3 sets - 10 reps  Sidelying Shoulder Abduction Palm Forward - 1-2 x daily - 7 x weekly - 2-3 sets - 10 reps      Therapeutic Exercise and NMR EXR  [x] (61881) Provided verbal/tactile cueing for activities related to strengthening, flexibility, endurance, ROM  for improvements in scapular, scapulothoracic and UE control with self care, reaching, carrying, lifting, house/yardwork, driving/computer work.    [] (62248) Provided verbal/tactile cueing for activities related to improving balance, coordination, kinesthetic sense, posture, motor skill, proprioception  to assist with  scapular, scapulothoracic and UE control with self care, reaching, carrying, lifting, house/yardwork, driving/computer work. Therapeutic Activities:    [] (60629 or 99867) Provided verbal/tactile cueing for activities related to improving balance, coordination, kinesthetic sense, posture, motor skill, proprioception and motor activation to allow for proper function of scapular, scapulothoracic and UE control with self care, carrying, lifting, driving/computer work.      Home Exercise Program:    [x] (10280) Reviewed/Progressed HEP activities related to strengthening, flexibility, endurance, ROM of scapular, scapulothoracic and UE control with self care, reaching, carrying, lifting, house/yardwork, driving/computer work  [] (74724) Reviewed/Progressed HEP activities related to improving balance, coordination, kinesthetic sense, posture, motor skill, proprioception of scapular, scapulothoracic and UE control with self care, reaching, carrying, lifting, house/yardwork, driving/computer work      Manual Treatments:  PROM / STM / Oscillations-Mobs:  G-I, II, III, IV (PA's, Inf., Post.)  [x] (86145) Provided manual therapy to mobilize soft tissue/joints of cervical/CT, scapular GHJ and UE for the purpose of modulating pain, promoting relaxation,  increasing ROM, reducing/eliminating soft tissue swelling/inflammation/restriction, improving soft tissue extensibility and allowing for proper ROM for normal function with self care, reaching, carrying, lifting, house/yardwork, driving/computer work    Modalities:       Charges:  Timed Code Treatment Minutes: 55   Total Treatment Minutes: 55       [] EVAL (LOW) 11348 (typically 20 minutes face-to-face)  [] EVAL (MOD) 89785 (typically 30 minutes face-to-face)  [] EVAL (HIGH) 15241 (typically 45 minutes face-to-face)  [] RE-EVAL     [x] WX(70229) x 2  [] DRY NEEDLE 1 OR 2 MUSCLES  [] NMR (12934) x     [] DRY NEEDLE 3+ MUSCLES  [x] Manual (51246) x 2      [] TA (46652) x     [] Mech Traction (12620)  [] ES(attended) (15636)     [] ES (un) (49638):   [] VASO (38986)  [] Other:     GOALS:  Patient stated goal: work pain free  [x] Progressing: [] Met: [] Not Met: [] Adjusted    Therapist goals for Patient:   Short Term Goals: To be achieved in: 2 weeks  1. Independent in HEP and progression per patient tolerance, in order to prevent re-injury. [] Progressing: [x] Met: [] Not Met: [] Adjusted  2.  Patient will have a decrease in pain to facilitate improvement in movement, function, and ADLs as indicated by Functional Deficits. [x] Progressing: [] Met: [] Not Met: [] Adjusted    Long Term Goals: To be achieved in: 10 weeks  1. Disability index score of 10% or less for the Quick DASH to assist with reaching prior level of function. [x] Progressing: [] Met: [] Not Met: [] Adjusted Comment:  27% disability on Quick Dash as of 5/6/22  2. Patient will demonstrate increased AROM to [facility] to allow for proper joint functioning as indicated by Functional Deficits. [x] Progressing: [] Met: [] Not Met: [] Adjusted  3. Patient will demonstrate an increase in NM recruitment/activation and overall GH and scapular strength to within n5lbs HHD or WNL for proper functional mobility as indicated by patients Functional Deficits. [x] Progressing: [] Met: [] Not Met: [] Adjusted  4. Patient will return to working out without pain. [x] Progressing: [] Met: [] Not Met: [] Adjusted  5. No pain with sleeping. [x] Progressing: [] Met: [] Not Met: [] Adjusted    ASSESSMENT:   Making good progress with end range active OH mobility, ER behind head, and IR behind back with current POC. Continued session focus primarily on manual techniques, self stretching addressing posterior GH joint capsule restrictions, and eccentric strengthening tasks that push patient to muscularly control L UE into end ranges where there is a stretch and mild mobility limitations remaining. Demonstrates good improvement in OH range at session conclusion.       Return to Play: (if applicable)    []  Stage 1: Intro to Strength   []  Stage 2: Dynamic Strength and Intro to Plyometrics   []  Stage 3: Advanced Plyometrics and Intro to Throwing   []  Stage 4: Sport specific Training/Return to Sport     []  Ready to Return to Play, [employer]   []  Not Ready for Return to Sports   Comments:      Treatment/Activity Tolerance:  [x] Patient tolerated treatment well [] Patient limited by fatique  [] Patient limited by pain  [] Patient limited by other medical complications  []

## 2022-06-03 ENCOUNTER — HOSPITAL ENCOUNTER (OUTPATIENT)
Dept: PHYSICAL THERAPY | Age: 47
Setting detail: THERAPIES SERIES
Discharge: HOME | End: 2022-06-03
Payer: COMMERCIAL

## 2022-06-03 NOTE — FLOWSHEET NOTE
[facility] [place] [facility]    Physical Therapy  Cancellation/No-show Note  Patient Name:  Irene Christianson  :  1975   Date:  6/3/2022  Cancelled visits to date: 0  No-shows to date: 0    For today's appointment patient:  [x]  Cancelled  []  Rescheduled appointment  []  No-show     Reason given by patient:  [x]  Patient ill  []  Conflicting appointment  []  No transportation    []  Conflict with work  []  No reason given  [x]  Other:     Comments:  Has a migraine.     Electronically signed by:  Elaine Dupont, PT, DPT, MS, SCS

## 2022-06-07 ENCOUNTER — HOSPITAL ENCOUNTER (OUTPATIENT)
Dept: PHYSICAL THERAPY | Age: 47
Setting detail: THERAPIES SERIES
Discharge: HOME | End: 2022-06-07
Payer: COMMERCIAL

## 2022-06-07 PROCEDURE — 97112 NEUROMUSCULAR REEDUCATION: CPT

## 2022-06-07 PROCEDURE — 97110 THERAPEUTIC EXERCISES: CPT

## 2022-06-07 PROCEDURE — 97140 MANUAL THERAPY 1/> REGIONS: CPT

## 2022-06-07 NOTE — FLOWSHEET NOTE
[address] [place]  Phone: [phone] Fax: [phone]          Physical Therapy Treatment Note/ Progress Report:     Date:  2022    Patient Name:  Mikie Garcia    :  1975  MRN: [id number]  Restrictions/Precautions:    Medical/Treatment Diagnosis Information:  · Diagnosis: L shoulder arthroscopy, SAD, DCE, shoulder pain M25. 512  · Treatment Diagnosis: L shoulder arthroscopy, SAD, DCE, shoulder pain M25. 117  Insurance/Certification information:     Physician Information:  Referring Practitioner: Dr Vidhi Dorado of care signed (Y/N):     Date of Patient follow up with Physician: mid May     Progress Report: []  Yes  [x]  No     Date Range for reporting period:  Beginnin21  Endin22     Progress report due (10 Rx/or 30 days whichever is less):    22 Progress note nv. Recertification due (POC duration/ or 90 days whichever is less):   22    Visit # Insurance Allowable Auth Needed   40  (20 in ) 40  9 visits left as of 22 []Yes    []No     Pain level:  1-2/10 soreness, achiness in L shoulder and soreness in the back     SUBJECTIVE:   Feels like she is making good progress. Still has days where her L shoulder feels really tight in the back of the shoulder primarily after working all day, but feels like this is more related to fatigue now rather than actual limitations in her L shoulder motion. OBJECTIVE: 22   Observation:    Test measurements:    27% disability - [employer]; 44% disability - [employer]     ROM Left (passive)  Post-op day 1 Left (passive)  22 Left (active)  22 Right   Shoulder Flex 160 140 125 170   Shoulder Abd 155 175 140 170   Shoulder ER @ 90 75 40  35 in scapular plane Reaches behind head to C7. 90   Shoulder IR @ 90 65 45 Reaches behind back to L1.  T5                       Strength  Left   Right   Shoulder Flex   5.5# 8.1 lb Shoulder Scap   5.7# 8.0 lb   Shoulder ER   7.1# 15.7 lb   Shoulder IR   9.4# 20.0 lb       RESTRICTIONS/PRECAUTIONS: L shoulder arthroscopy 11/5/21. L shoulder scope with scar tissue debridement 3/25/22.     Exercises/Interventions:   Therapeutic Ex (33875) Sets/sec Reps CUES/Notes   UBE 4 min1/2 retro   Supine cane AA flexion - supine - varying angle 1 10 Used red tube for active flexion, scaption, abd   Supine wand ER stretch - varying angle 1 10 Used yellow ball for LLLD today   Table Slides - flexion/abduction  1 10 R UE assist   Subscap/lat stretch - hand on mat, backing up for OH flexion stretch 5s 10    Wall slides flexion/abduction/washes 1 10 Wall slide+lift-off per emily   IR towel stretch behind back 10s 10    Doorway ER stretch - 70 deg elevation 10s 10 Corner stretch   Beach chair ER stretch - 2# weight  3-5 min LLLD   Wall walk flexion w/45 deg rot away  10s 10 Targeting posterior capsule restrictions   Cross body adduction stretch - scapular blocked on wall, straight arm 20s 5    Sidelying open book - hand in beach chair position for posterior capsule stretch 15s 10 R sidelying   IR stretch behind back w/strap 10s 10 Ant shoulder blocked against wall   Posterior capsule stretch w/wall OP in ER behind head 10s 10    Prone posterior capsule stretch - 2#  3 min    Supine beach chair ER stretch - 2#  3 min          Seated shoulder flexion stretch 3 min  Hands on table, scooting stool back   Seated lat stretch 2 min  L hand on table, rotating trunk   Supine SA punch - 5# cane      Active shoulder flexion    Staying within comfortable range   Sidelying shoulder ER      Sidelying shoulder abd to 90deg      Wrist extensor stretch 20sec 4    Prone snow timo  10    Supine snow timo   10 Partial range   TB ext - ecc focus to the point of stretch - Green 2 10    TB adduction - ecc focus to the point of stretch - Green 2 10    Supine ecc abd - 2# 1 15          Manual Intervention  (00264)       L GH inferior and posterior glides - supine and sidelying  8 min Gr. II-III   L shoulder PROM - flex, abd, ER/IR  12 min    Prone IR stretch   Good tolerance   STM levator and subscap     Sidelying scapular mobs  4 minGr. III-IV   Sidelying pin and stretch subscap  3 min    Sidelying, prone posterior capsule stretch  4 min Gr. III-IV   Supine beach chair ER mobilization  4 min Gr. III-IV   Prone anterior glide  nv          NMR Re-education (55904)      Prone Ts 5s 15    Wall ball rolls abd - yellow - cw/ccw 2 10 Each direction   TB ER to uppercut - Green 3s 15                                                    Therapeutic Activity (20551)      UE throwing porgression      Dynamic UE stability      Earthquake Bar      Bodyblade                  Access Code: [email or web address]  URL: [email or web address]  Date: 04/01/2022  Prepared by: Delbert Garcia    Exercises  Supine Shoulder Protraction with Dowel - 1-2 x daily - 7 x weekly - 2-3 sets - 10 reps  Supine Shoulder Flexion Extension Full Range AROM - 1-2 x daily - 7 x weekly - 2-3 sets - 10 reps  Sidelying Shoulder External Rotation - 1-2 x daily - 7 x weekly - 2-3 sets - 10 reps  Sidelying Shoulder Abduction Palm Forward - 1-2 x daily - 7 x weekly - 2-3 sets - 10 reps      Therapeutic Exercise and NMR EXR  [x] (80145) Provided verbal/tactile cueing for activities related to strengthening, flexibility, endurance, ROM  for improvements in scapular, scapulothoracic and UE control with self care, reaching, carrying, lifting, house/yardwork, driving/computer work.    [] (54621) Provided verbal/tactile cueing for activities related to improving balance, coordination, kinesthetic sense, posture, motor skill, proprioception  to assist with  scapular, scapulothoracic and UE control with self care, reaching, carrying, lifting, house/yardwork, driving/computer work.     Therapeutic Activities:    [] (15105 or 73802) Provided verbal/tactile cueing for activities related to improving balance, coordination, kinesthetic sense, posture, motor skill, proprioception and motor activation to allow for proper function of scapular, scapulothoracic and UE control with self care, carrying, lifting, driving/computer work.      Home Exercise Program:    [x] (50882) Reviewed/Progressed HEP activities related to strengthening, flexibility, endurance, ROM of scapular, scapulothoracic and UE control with self care, reaching, carrying, lifting, house/yardwork, driving/computer work  [] (04629) Reviewed/Progressed HEP activities related to improving balance, coordination, kinesthetic sense, posture, motor skill, proprioception of scapular, scapulothoracic and UE control with self care, reaching, carrying, lifting, house/yardwork, driving/computer work      Manual Treatments:  PROM / STM / Oscillations-Mobs:  G-I, II, III, IV (PA's, Inf., Post.)  [x] (43005) Provided manual therapy to mobilize soft tissue/joints of cervical/CT, scapular GHJ and UE for the purpose of modulating pain, promoting relaxation,  increasing ROM, reducing/eliminating soft tissue swelling/inflammation/restriction, improving soft tissue extensibility and allowing for proper ROM for normal function with self care, reaching, carrying, lifting, house/yardwork, driving/computer work    Modalities:       Charges:  Timed Code Treatment Minutes: 55   Total Treatment Minutes: 55       [] EVAL (LOW) 79051 (typically 20 minutes face-to-face)  [] EVAL (MOD) 47061 (typically 30 minutes face-to-face)  [] EVAL (HIGH) 09878 (typically 45 minutes face-to-face)  [] RE-EVAL     [x] [place](44605) x 1  [] DRY NEEDLE 1 OR 2 MUSCLES  [x] NMR (43578) x 1    [] DRY NEEDLE 3+ MUSCLES  [x] Manual (71230) x 2      [] TA (50230) x     [] [facility] (75858)  [] ES(attended) (82599)     [] ES (un) (73226):   [] VASO (91301)  [] Other:     GOALS:  Patient stated goal: work pain free  [x] Progressing: [] Met: [] Not Met: [] Adjusted    Therapist goals for Patient:   Short Term Goals: To be achieved in: 2 weeks  1. Independent in HEP and progression per patient tolerance, in order to prevent re-injury. [] Progressing: [x] Met: [] Not Met: [] Adjusted  2. Patient will have a decrease in pain to facilitate improvement in movement, function, and ADLs as indicated by Functional Deficits. [x] Progressing: [] Met: [] Not Met: [] Adjusted    Long Term Goals: To be achieved in: 10 weeks  1. Disability index score of 10% or less for the Quick DASH to assist with reaching prior level of function. [x] Progressing: [] Met: [] Not Met: [] Adjusted Comment:  27% disability on Quick Dash as of 5/6/22  2. Patient will demonstrate increased AROM to [employer] [facility] to allow for proper joint functioning as indicated by Functional Deficits. [x] Progressing: [] Met: [] Not Met: [] Adjusted  3. Patient will demonstrate an increase in NM recruitment/activation and overall GH and scapular strength to within n5lbs HHD or WNL for proper functional mobility as indicated by patients Functional Deficits. [x] Progressing: [] Met: [] Not Met: [] Adjusted  4. Patient will return to working out without pain. [x] Progressing: [] Met: [] Not Met: [] Adjusted  5. No pain with sleeping. [x] Progressing: [] Met: [] Not Met: [] Adjusted    ASSESSMENT:   Making good progress with end range active OH mobility, ER behind head, and IR behind back. Continued session focus primarily on manual techniques, self stretching addressing posterior [address] joint capsule restrictions, and eccentric strengthening tasks emphasizing RTC activation that push patient to muscularly control L UE into end ranges where there is a stretch and mild mobility limitations remaining. Demonstrates good improvement in OH range at session conclusion. Fatigued in posterior shoulder.     Return to Play: (if applicable)    []  Stage 1: Intro to Strength   []  Stage 2: Dynamic Strength and Intro to Plyometrics   []  Stage 3: Advanced Plyometrics and Intro to Throwing   [] Stage 4: Sport specific Training/Return to Sport     []  Ready to Return to Play, [employer]   []  Not Ready for Return to Sports   Comments:      Treatment/Activity Tolerance:  [x] Patient tolerated treatment well [] Patient limited by fatique  [] Patient limited by pain  [] Patient limited by other medical complications  [] Other:     Overall Progression Towards Functional goals/ Treatment Progress Update:  [x] Patient is progressing as expected towards functional goals listed. [] Progression is slowed due to complexities/Impairments listed. - presenting like potential frozen shoulder resulting from fall after surgery  [] Progression has been slowed due to co-morbidities. [] Plan just implemented, too soon to assess goals progression <30days   [] Goals require adjustment due to lack of progress  [] Patient is not progressing as expected and requires additional follow up with physician  [] Other    Prognosis for POC: [x] Good [] Fair  [] Poor    Patient requires continued skilled intervention: [x] Yes  [] No      PLAN: Continue PT 1-2x/week for 4 weeks  [x] Continue per plan of care [] Alter current plan (see comments)  [] Plan of care initiated [] Hold pending MD visit [] Discharge    Electronically signed by: Terry Yap, PT, DPT, MS, SCS    Note: If patient does not return for scheduled/recommended follow up visits, this note will serve as a discharge from care along with the most recent update on progress.

## 2022-06-14 ENCOUNTER — HOSPITAL ENCOUNTER (OUTPATIENT)
Dept: PHYSICAL THERAPY | Age: 47
Setting detail: THERAPIES SERIES
Discharge: HOME | End: 2022-06-14
Payer: COMMERCIAL

## 2022-06-14 NOTE — FLOWSHEET NOTE
[facility]    Physical Therapy  Cancellation/No-show Note  Patient Name:  Leslie Kim  :  1975   Date:  2022  Cancelled visits to date: 0  No-shows to date: 0    For today's appointment patient:  [x]  Cancelled  []  Rescheduled appointment  []  No-show     Reason given by patient:  []  Patient ill  []  Conflicting appointment  []  No transportation    []  Conflict with work  []  No reason given  [x]  Other:     Comments:  Michelle Armstrong, is having a knee scope.      Electronically signed by:  Claire Olmstead, PT, DPT, MS, SCS

## 2022-06-24 ENCOUNTER — HOSPITAL ENCOUNTER (OUTPATIENT)
Dept: PHYSICAL THERAPY | Age: 47
Setting detail: THERAPIES SERIES
Discharge: HOME | End: 2022-06-24
Payer: COMMERCIAL

## 2022-06-24 PROCEDURE — 97140 MANUAL THERAPY 1/> REGIONS: CPT

## 2022-06-24 PROCEDURE — 97110 THERAPEUTIC EXERCISES: CPT

## 2022-06-24 NOTE — FLOWSHEET NOTE
[place] [address] [facility]  Phone: [phone] Fax: [phone]          Physical Therapy Treatment Note/ Progress Report:     Date:  2022    Patient Name:  Jeffry Kiser    :  1975  MRN: [id number]  Restrictions/Precautions:    Medical/Treatment Diagnosis Information:  · Diagnosis: L shoulder arthroscopy, SAD, DCE, shoulder pain M25. 512  · Treatment Diagnosis: L shoulder arthroscopy, SAD, DCE, shoulder pain M25. 925  Insurance/Certification information:     Physician Information:  Referring Practitioner: Dr Georgi Stevens [facility] signed (Y/N):     Date of Patient follow up with Physician: mid May     Progress Report: []  Yes  [x]  No     Date Range for reporting period:  Beginnin21  Endin22     Progress report due (10 Rx/or 30 days whichever is less):    22 Progress note nv. Recertification due (POC duration/ or 90 days whichever is less):   22    Visit # Insurance Allowable Auth Needed   40  (21 in ) 40  8 visits left as of 22 []Yes    []No     Pain level:  1-2/10 soreness, achiness in L shoulder and soreness in the back     SUBJECTIVE:   Saw Dr. Ronit Hummel for follow up recently. States that Lila seemed very happy with her progress, but wanted her to keep working on getting those last few degrees of motion where she is still limited. Will follow back up with her again in 6 weeks. Does feel like her L shoulder is finally feeling more normal with her everyday activities and her work activities. Still cannot fully reach all the way up over her head without bending her elbow at the top of the range.     OBJECTIVE: 22   Observation:    Test measurements:    27% disability - Quick [employer]; 44% disability - [employer] Work     ROM Left (passive)  Post-op day 1 Left (passive)  22 Left (active)  22 Right   Shoulder Flex 160 140 125 170   Shoulder Abd 155 175 140 170   Shoulder ER @ 90 75 40  35 in scapular plane Reaches behind head to C7. 90   Shoulder IR @ 90 65 45 Reaches behind back to L1. T5                       Strength  Left   Right   Shoulder Flex   5.5# 8.1 lb   Shoulder Scap   5.7# 8.0 lb   Shoulder ER   7.1# 15.7 lb   Shoulder IR   9.4# 20.0 lb       RESTRICTIONS/PRECAUTIONS: L shoulder arthroscopy 11/5/21. L shoulder scope with scar tissue debridement 3/25/22.     Exercises/Interventions:   Therapeutic Ex (77031) Sets/sec Reps CUES/Notes   UBE 4 min1/2 retro   Supine cane AA flexion - supine - varying angle 1 10 Used red tube for active flexion, scaption, abd   Supine wand ER stretch - varying angle 1 10 Used yellow ball for LLLD today   Table Slides - flexion/abduction  1 10 R UE assist   Subscap/lat stretch - hand on mat, backing up for OH flexion stretch 5s 10    Wall slides flexion/abduction/washes 1 10 Wall slide+lift-off per emily   IR towel stretch behind back 10s 10    Doorway ER stretch - 70 deg elevation 10s 10 Corner stretch   Beach chair ER stretch - 2# weight  3-5 min LLLD   Wall walk flexion w/45 deg rot away  10s 10 Targeting posterior capsule restrictions   Cross body adduction stretch - scapular blocked on wall, straight arm 20s 5    Sidelying open book - hand in beach chair position for posterior capsule stretch 15s 10 R sidelying   IR stretch behind back w/strap 10s 10 Ant shoulder blocked against wall   Posterior capsule stretch w/wall OP in ER behind head 10s 10    Prone posterior capsule stretch - 2#  3 min    Supine beach chair ER stretch - 2#  3 min    Doorway subscap stretch 10s 10    Wall rainbows - half kneeling 1 10          Seated shoulder flexion stretch 3 min  Hands on table, scooting stool back   Seated lat stretch 2 min  L hand on table, rotating trunk   Supine SA punch - 5# cane      Active shoulder flexion    Staying within comfortable range   Sidelying shoulder ER      Sidelying shoulder abd to 90deg      Wrist extensor stretch 20sec 4    Prone snow timo  10    Supine snow timo   10 Partial range   TB ext - ecc focus to the point of stretch - Green 2 10    TB adduction - ecc focus to the point of stretch - Green 2 10    Supine ecc abd - 2# 1 15          Manual Intervention  (90860)       L GH inferior and posterior glides - supine and sidelying  8 min Gr. II-III   L shoulder PROM - flex, abd, ER/IR  12 min    Prone IR stretch   Good tolerance   STM levator and subscap     Sidelying scapular mobs  4 minGr. III-IV   Sidelying pin and stretch subscap  3 min    Sidelying, prone posterior capsule stretch  4 min Gr. III-IV   Supine beach chair ER mobilization  4 min Gr. III-IV   Prone anterior glide  nv          NMR Re-education (11269)      Prone Ts 5s 15    Wall ball rolls abd - yellow - cw/ccw 2 10 Each direction   TB ER to uppercut - Green 3s 15                                                    Therapeutic Activity (64135)      UE throwing porgression      Dynamic UE stability      Earthquake Bar      Bodyblade                  Access Code: [email or web address]  URL: [email or web address]  Date: 04/01/2022  Prepared by: Alvino Rings    Exercises  Supine Shoulder Protraction with Dowel - 1-2 x daily - 7 x weekly - 2-3 sets - 10 reps  Supine Shoulder Flexion Extension Full Range AROM - 1-2 x daily - 7 x weekly - 2-3 sets - 10 reps  Sidelying Shoulder External Rotation - 1-2 x daily - 7 x weekly - 2-3 sets - 10 reps  Sidelying Shoulder Abduction Palm Forward - 1-2 x daily - 7 x weekly - 2-3 sets - 10 reps      Therapeutic Exercise and NMR EXR  [x] (68419) Provided verbal/tactile cueing for activities related to strengthening, flexibility, endurance, ROM  for improvements in scapular, scapulothoracic and UE control with self care, reaching, carrying, lifting, house/yardwork, driving/computer work.    [] (07430) Provided verbal/tactile cueing for activities related to improving balance, coordination, kinesthetic sense, posture, motor skill, proprioception  to assist with  scapular, scapulothoracic and UE control with self care, reaching, carrying, lifting, house/yardwork, driving/computer work. Therapeutic Activities:    [] (94546 or 46934) Provided verbal/tactile cueing for activities related to improving balance, coordination, kinesthetic sense, posture, motor skill, proprioception and motor activation to allow for proper function of scapular, scapulothoracic and UE control with self care, carrying, lifting, driving/computer work.      Home Exercise Program:    [x] (85158) Reviewed/Progressed HEP activities related to strengthening, flexibility, endurance, ROM of scapular, scapulothoracic and UE control with self care, reaching, carrying, lifting, house/yardwork, driving/computer work  [] (75226) Reviewed/Progressed HEP activities related to improving balance, coordination, kinesthetic sense, posture, motor skill, proprioception of scapular, scapulothoracic and UE control with self care, reaching, carrying, lifting, house/yardwork, driving/computer work      Manual Treatments:  PROM / STM / Oscillations-Mobs:  G-I, II, III, IV (PA's, Inf., Post.)  [x] (73310) Provided manual therapy to mobilize soft tissue/joints of cervical/CT, scapular GHJ and UE for the purpose of modulating pain, promoting relaxation,  increasing ROM, reducing/eliminating soft tissue swelling/inflammation/restriction, improving soft tissue extensibility and allowing for proper ROM for normal function with self care, reaching, carrying, lifting, house/yardwork, driving/computer work    Modalities:       Charges:  Timed Code Treatment Minutes: 52   Total Treatment Minutes: 52       [] EVAL (LOW) 39519 (typically 20 minutes face-to-face)  [] EVAL (MOD) 98195 (typically 30 minutes face-to-face)  [] EVAL (HIGH) 43279 (typically 45 minutes face-to-face)  [] RE-EVAL     [x] JN(85705) x 1  [] DRY NEEDLE 1 OR 2 MUSCLES  [] NMR (37354) x     [] DRY NEEDLE 3+ MUSCLES  [x] Manual (95306) x 2      [] TA (05474) x [] [facility] Traction (44442)  [] ES(attended) (25639)     [] ES (un) (64168):   [] VASO (50547)  [] Other:     GOALS:  Patient stated goal: work pain free  [x] Progressing: [] Met: [] Not Met: [] Adjusted    Therapist goals for Patient:   Short Term Goals: To be achieved in: 2 weeks  1. Independent in HEP and progression per patient tolerance, in order to prevent re-injury. [] Progressing: [x] Met: [] Not Met: [] Adjusted  2. Patient will have a decrease in pain to facilitate improvement in movement, function, and ADLs as indicated by Functional Deficits. [x] Progressing: [] Met: [] Not Met: [] Adjusted    Long Term Goals: To be achieved in: 10 weeks  1. Disability index score of 10% or less for the Quick DASH to assist with reaching prior level of function. [x] Progressing: [] Met: [] Not Met: [] Adjusted Comment:  27% disability on Quick Dash as of 5/6/22  2. Patient will demonstrate increased AROM to [facility] to allow for proper joint functioning as indicated by Functional Deficits. [x] Progressing: [] Met: [] Not Met: [] Adjusted  3. Patient will demonstrate an increase in NM recruitment/activation and overall GH and scapular strength to within n5lbs HHD or WNL for proper functional mobility as indicated by patients Functional Deficits. [x] Progressing: [] Met: [] Not Met: [] Adjusted  4. Patient will return to working out without pain. [x] Progressing: [] Met: [] Not Met: [] Adjusted  5. No pain with sleeping. [x] Progressing: [] Met: [] Not Met: [] Adjusted    ASSESSMENT:  Active L shoulder mobility much improved. Mild restrictions still present primarily with end range flexion, ER behind head, IR behind back. Saw Dr. Vahe Antunez for follow up recently. Dr. Vahe Antunez happy with progress in terms of both ROM, mobility, and strength per patient feedback.  Patient does feel like her L shoulder is finally feeling more normal, but it is still stiff all the way up over her head where she is unable to maintain a straight elbow. Supposed to follow back up with Lila again in 6 more weeks. Continued session focus primarily on manual techniques, self stretching addressing posterior [address] joint capsule restrictions to address those mild remaining end range restrictions. Plan on updating POC, measures at nv. Return to Play: (if applicable)    []  Stage 1: Intro to Strength   []  Stage 2: Dynamic Strength and Intro to Plyometrics   []  Stage 3: Advanced Plyometrics and Intro to Throwing   []  Stage 4: Sport specific Training/Return to Sport     []  Ready to Return to Play, [email or web address]   []  Not Ready for Return to Sports   Comments:      Treatment/Activity Tolerance:  [x] Patient tolerated treatment well [] Patient limited by fatique  [] Patient limited by pain  [] Patient limited by other medical complications  [] Other:     Overall Progression Towards Functional goals/ Treatment Progress Update:  [x] Patient is progressing as expected towards functional goals listed. [] Progression is slowed due to complexities/Impairments listed. - presenting like potential frozen shoulder resulting from fall after surgery  [] Progression has been slowed due to co-morbidities. [] Plan just implemented, too soon to assess goals progression <30days   [] Goals require adjustment due to lack of progress  [] Patient is not progressing as expected and requires additional follow up with physician  [] Other    Prognosis for POC: [x] Good [] Fair  [] Poor    Patient requires continued skilled intervention: [x] Yes  [] No      PLAN: Continue PT 1-2x/week for 4 weeks  [x] Continue per plan of care [] Alter current plan (see comments)  [] Plan of care initiated [] Hold pending MD visit [] Discharge    Electronically signed by: Ailyn Garvin, PT, DPT, MS, SCS    Note: If patient does not return for scheduled/recommended follow up visits, this note will serve as a discharge from care along with the most recent update on progress.

## 2022-07-01 ENCOUNTER — HOSPITAL ENCOUNTER (OUTPATIENT)
Dept: PHYSICAL THERAPY | Age: 47
Setting detail: THERAPIES SERIES
Discharge: HOME | End: 2022-07-01
Payer: COMMERCIAL

## 2022-07-01 PROCEDURE — 97110 THERAPEUTIC EXERCISES: CPT

## 2022-07-01 PROCEDURE — 97140 MANUAL THERAPY 1/> REGIONS: CPT

## 2022-07-01 NOTE — PLAN OF CARE
[address] [address] [address]  Phone: [phone] Fax: [phone]          Physical Therapy Re-Certification Plan of Care/MD UPDATE      Dear Dr. Kimberly Rios,    We had the pleasure of treating the following patient for physical therapy services at [address] [place] [address]. A summary of our findings can be found in the updated assessment below. This includes our plan of care. If you have any questions or concerns regarding these findings, please do not hesitate to contact me at the office phone number checked above. Thank you for the referral.     Physician Signature:________________________________Date:__________________  By signing above (or electronic signature), therapists plan is approved by physician    Date Range Of Visits: 5/6/22 to 7/1/22  Total Visits to Date: 39 (25 in 2022)  Overall Response to Treatment:   [x]Patient is responding well to treatment and improvement is noted with regards  to goals   []Patient should continue to improve in reasonable time if they continue HEP   []Patient has plateaued and is no longer responding to skilled PT intervention    []Patient is getting worse and would benefit from return to referring MD   []Patient unable to adhere to initial POC   [x]Other: Patient has been seen for 41 PT visits following L shoulder scope 11/5/21 and then a second scope with scar debridement on 3/25/22, 22 visits in 2022. Patient saw Dr. Viridiana Pedro for follow up recently. States that [facility] seemed very happy with her progress, but wanted her to keep working on getting those last few degrees of motion where she is still limited. Will follow back up with her again in 6 weeks. Patient does feel like her L shoulder is finally feeling more normal with her everyday activities and her work activities.  She still cannot fully reach all the way up over her head without having her elbow bent bending at the top of the range due to posterior capsule, and scapular mobility restrictions. Primary focus of sessions has been on address posterior capsule and scapular mobility restrictions followed by exercises to strengthen RTC, scapula muscles to improve patient's ability to utilize these end ranges of motion. Do think that patient's L shoulder strength is still limited by these restrictions, but patient is doing a lot of strengthening on her own outside of PT to address these strength deficits in conjunction with PT. Will cont to benefit from skilled PT to address these remaining end range deficits for full, safe return to PLOF pain free and without limitations. Physical Therapy Treatment Note/ Progress Report:     Date:  2022    Patient Name:  Leonel Bonilla    :  1975  MRN: [id number]  Restrictions/Precautions:    Medical/Treatment Diagnosis Information:  · Diagnosis: L shoulder arthroscopy, SAD, DCE, shoulder pain M25. 512  · Treatment Diagnosis: L shoulder arthroscopy, SAD, DCE, shoulder pain M25. 867  Insurance/Certification information:     Physician Information:  Referring Practitioner: Dr Juan Ford of care signed (Y/N):     Date of Patient follow up with Physician: mid May     Progress Report: [x]  Yes  []  No     Date Range for reporting period:  Beginnin21  Endin22     Progress report due (10 Rx/or 30 days whichever is less):      Recertification due (POC duration/ or 90 days whichever is less):   22    Visit # Insurance Allowable Auth Needed   41  (22 in ) 40  7 visits left as of 22 []Yes    []No     Pain level:  1-2/10 soreness, achiness in L shoulder and soreness in the back     SUBJECTIVE:   Saw Dr. Lady Amado for follow up recently. States that Lila seemed very happy with her progress, but wanted her to keep working on getting those last few degrees of motion where she is still limited. Will follow back up with her again in 6 weeks.  Does feel like her L shoulder is finally feeling more normal with her everyday activities and her work activities. Still cannot fully reach all the way up over her head without bending her elbow at the top of the range. OBJECTIVE: 7/1/22   Observation:    Test measurements:    27% disability - [employer]; 44% disability - [employer]     ROM Left (passive)  Post-op day 1 Left (passive)  5/6/22 Left (active)  5/6/22 7/1/22 Right   Shoulder Flex 160 140 125 140 170   Shoulder Abd 155 175 140 140 170   Shoulder ER @ 90 75 40  35 in scapular plane Reaches behind head to C7. Reaches behind head to T2. 90   Shoulder IR @ 90 65 45 Reaches behind back to L1. Reaches behind back to L1. T5                     Strength  Left    Right   Shoulder Flex   5.5# 6.4# 13.3#   Shoulder Scap   5.7# 6.8# 16.0#   Shoulder ER   7.1# 11.0# 16.6#   Shoulder IR   9.4# 22.5# 24.2#       RESTRICTIONS/PRECAUTIONS: L shoulder arthroscopy 11/5/21. L shoulder scope with scar tissue debridement 3/25/22.     Exercises/Interventions:   Therapeutic Ex (09503) Sets/sec Reps CUES/Notes   UBE 4 min1/2 retro   Supine cane AA flexion - supine - varying angle 1 10 Used red tube for active flexion, scaption, abd   Supine wand ER stretch - varying angle 1 10 Used yellow ball for LLLD today   Table Slides - flexion/abduction  1 10 R UE assist   Subscap/lat stretch - hand on mat, backing up for OH flexion stretch 5s 10    Wall slides flexion/abduction/washes 1 10 Wall slide+lift-off per emily   IR towel stretch behind back 10s 10    Doorway ER stretch - 70 deg elevation 10s 10 Corner stretch   Beach chair ER stretch - 2# weight  3-5 min LLLD   Wall walk flexion w/45 deg rot away  10s 10 Targeting posterior capsule restrictions   Cross body adduction stretch - scapular blocked on wall, straight arm 20s 5    Sidelying open book - hand in beach chair position for posterior capsule stretch 15s 10 R sidelying   IR stretch behind back w/strap 10s 10 Ant shoulder blocked against wall Posterior capsule stretch w/wall OP in ER behind head 10s 10    Prone posterior capsule stretch - 2#  3 min    Supine beach chair ER stretch - 2#  3 min    Doorway subscap stretch 10s 10    Wall rainbows - half kneeling 1 10          Seated shoulder flexion stretch 3 min  Hands on table, scooting stool back   Seated lat stretch 2 min  L hand on table, rotating trunk   Supine SA punch - 5# cane      Active shoulder flexion    Staying within comfortable range   Sidelying shoulder ER      Sidelying shoulder abd to 90deg      Wrist extensor stretch 20sec 4    Prone snow timo  10    Supine snow timo   10 Partial range   TB ext - ecc focus to the point of stretch - Green 2 10    TB adduction - ecc focus to the point of stretch - Green 2 10    Supine ecc abd - 2# 1 15          Manual Intervention  (17822)       L GH inferior and posterior glides - supine and sidelying  8 min Gr. II-III   L shoulder PROM - flex, abd, ER/IR  12 min    Prone IR stretch   Good tolerance   STM levator and subscap     Sidelying scapular mobs  4 minGr. III-IV   Sidelying pin and stretch subscap  3 min    Sidelying, prone posterior capsule stretch  4 min Gr. III-IV   Supine beach chair ER mobilization  4 min Gr. III-IV   Prone anterior glide  nv          NMR Re-education (80651)      Prone Ts 5s 15    Wall ball rolls abd - yellow - cw/ccw 2 10 Each direction   TB ER to uppercut - Green 3s 15                                                    Therapeutic Activity (52708)      UE throwing porgression      Dynamic UE stability      Earthquake Bar      [employer]                  Access Code: [email or web address]  URL: [email or web address]  Date: 04/01/2022  Prepared by: Jeremy Valdes    Exercises  Supine Shoulder Protraction with Dowel - 1-2 x daily - 7 x weekly - 2-3 sets - 10 reps  Supine Shoulder Flexion Extension Full Range AROM - 1-2 x daily - 7 x weekly - 2-3 sets - 10 reps  Sidelying Shoulder External Rotation - 1-2 x daily - 7 x weekly - 2-3 sets - 10 reps  Sidelying Shoulder Abduction Palm Forward - 1-2 x daily - 7 x weekly - 2-3 sets - 10 reps      Therapeutic Exercise and NMR EXR  [x] (31685) Provided verbal/tactile cueing for activities related to strengthening, flexibility, endurance, ROM  for improvements in scapular, scapulothoracic and UE control with self care, reaching, carrying, lifting, house/yardwork, driving/computer work.    [] (45373) Provided verbal/tactile cueing for activities related to improving balance, coordination, kinesthetic sense, posture, motor skill, proprioception  to assist with  scapular, scapulothoracic and UE control with self care, reaching, carrying, lifting, house/yardwork, driving/computer work. Therapeutic Activities:    [] (15326 or 90028) Provided verbal/tactile cueing for activities related to improving balance, coordination, kinesthetic sense, posture, motor skill, proprioception and motor activation to allow for proper function of scapular, scapulothoracic and UE control with self care, carrying, lifting, driving/computer work.      Home Exercise Program:    [x] (22323) Reviewed/Progressed HEP activities related to strengthening, flexibility, endurance, ROM of scapular, scapulothoracic and UE control with self care, reaching, carrying, lifting, house/yardwork, driving/computer work  [] (44104) Reviewed/Progressed HEP activities related to improving balance, coordination, kinesthetic sense, posture, motor skill, proprioception of scapular, scapulothoracic and UE control with self care, reaching, carrying, lifting, house/yardwork, driving/computer work      Manual Treatments:  PROM / STM / Oscillations-Mobs:  G-I, II, III, IV (PA's, Inf., Post.)  [x] (52382) Provided manual therapy to mobilize soft tissue/joints of cervical/CT, scapular GHJ and UE for the purpose of modulating pain, promoting relaxation,  increasing ROM, reducing/eliminating soft tissue swelling/inflammation/restriction, improving soft tissue extensibility and allowing for proper ROM for normal function with self care, reaching, carrying, lifting, house/yardwork, driving/computer work    Modalities:       Charges:  Timed Code Treatment Minutes: 52   Total Treatment Minutes: 52       [] EVAL (LOW) 97549 (typically 20 minutes face-to-face)  [] EVAL (MOD) 75611 (typically 30 minutes face-to-face)  [] EVAL (HIGH) 97727 (typically 45 minutes face-to-face)  [] RE-EVAL     [x] QX(18747) x 1  [] DRY NEEDLE 1 OR 2 MUSCLES  [] NMR (07347) x     [] DRY NEEDLE 3+ MUSCLES  [x] Manual (11254) x 2      [] TA (37619) x     [] Mech Traction (70521)  [] ES(attended) (00356)     [] ES (un) (56037):   [] VASO (85886)  [] Other:     GOALS:  Patient stated goal: work pain free  [x] Progressing: [] Met: [] Not Met: [] Adjusted    Therapist goals for Patient:   Short Term Goals: To be achieved in: 2 weeks  1. Independent in HEP and progression per patient tolerance, in order to prevent re-injury. [] Progressing: [x] Met: [] Not Met: [] Adjusted  2. Patient will have a decrease in pain to facilitate improvement in movement, function, and ADLs as indicated by Functional Deficits. [] Progressing: [x] Met: [] Not Met: [] Adjusted    Long Term Goals: To be achieved in: 10 weeks  1. Disability index score of 10% or less for the [employer] to assist with reaching prior level of function. [x] Progressing: [] Met: [] Not Met: [] Adjusted Comment:  27% disability on [employer] as of 5/6/22  2. Patient will demonstrate increased AROM to [facility] to allow for proper joint functioning as indicated by Functional Deficits. [x] Progressing: [] Met: [] Not Met: [] Adjusted  3. Patient will demonstrate an increase in NM recruitment/activation and overall GH and scapular strength to within 5lbs HHD or WNL for proper functional mobility as indicated by patients Functional Deficits. [x] Progressing: [] Met: [] Not Met: [] Adjusted  4. Patient will return to working out without pain.    [] Progressing: [x] Met: [] Not Met: [] Adjusted  5. No pain with sleeping. [] Progressing: [x] Met: [] Not Met: [] Adjusted    ASSESSMENT:  Patient has been seen for 39 PT visits following L shoulder scope 11/5/21 and then a second scope with scar debridement on 3/25/22, 22 visits in 2022. Patient saw Dr. Raulito Gomez for follow up recently. States that Lila seemed very happy with her progress, but wanted her to keep working on getting those last few degrees of motion where she is still limited. Will follow back up with her again in 6 weeks. Patient does feel like her L shoulder is finally feeling more normal with her everyday activities and her work activities. She still cannot fully reach all the way up over her head without having her elbow bent bending at the top of the range due to posterior capsule, and scapular mobility restrictions. Primary focus of sessions has been on address posterior capsule and scapular mobility restrictions followed by exercises to strengthen RTC, scapula muscles to improve patient's ability to utilize these end ranges of motion. Do think that patient's L shoulder strength is still limited by these restrictions, but patient is doing a lot of strengthening on her own outside of PT to address these strength deficits in conjunction with PT. Will cont to benefit from skilled PT to address these remaining end range deficits for full, safe return to PLOF pain free and without limitations.     Return to Play: (if applicable)    []  Stage 1: Intro to Strength   []  Stage 2: Dynamic Strength and Intro to Plyometrics   []  Stage 3: Advanced Plyometrics and Intro to Throwing   []  Stage 4: Sport specific Training/Return to Sport     []  Ready to Return to Play, [employer]   []  Not Ready for Return to Sports   Comments:      Treatment/Activity Tolerance:  [x] Patient tolerated treatment well [] Patient limited by fatique  [] Patient limited by pain  [] Patient limited by other medical complications  [] Other:     Overall Progression Towards Functional goals/ Treatment Progress Update:  [x] Patient is progressing as expected towards functional goals listed. [] Progression is slowed due to complexities/Impairments listed. - presenting like potential frozen shoulder resulting from fall after surgery  [] Progression has been slowed due to co-morbidities. [] Plan just implemented, too soon to assess goals progression <30days   [] Goals require adjustment due to lack of progress  [] Patient is not progressing as expected and requires additional follow up with physician  [] Other    Prognosis for POC: [x] Good [] Fair  [] Poor    Patient requires continued skilled intervention: [x] Yes  [] No      PLAN: Continue PT 1-2x/week for 4 weeks  [x] Continue per plan of care [] Alter current plan (see comments)  [] Plan of care initiated [] Hold pending MD visit [] Discharge    Electronically signed by: Loree Guerrero, PT, DPT, MS, SCS    Note: If patient does not return for scheduled/recommended follow up visits, this note will serve as a discharge from care along with the most recent update on progress.

## 2022-07-08 ENCOUNTER — HOSPITAL ENCOUNTER (OUTPATIENT)
Dept: PHYSICAL THERAPY | Age: 47
Setting detail: THERAPIES SERIES
Discharge: HOME | End: 2022-07-08
Payer: COMMERCIAL

## 2022-07-08 PROCEDURE — 97140 MANUAL THERAPY 1/> REGIONS: CPT

## 2022-07-08 PROCEDURE — 97110 THERAPEUTIC EXERCISES: CPT

## 2022-07-08 PROCEDURE — 97112 NEUROMUSCULAR REEDUCATION: CPT

## 2022-07-15 ENCOUNTER — HOSPITAL ENCOUNTER (OUTPATIENT)
Dept: PHYSICAL THERAPY | Age: 47
Setting detail: THERAPIES SERIES
Discharge: HOME | End: 2022-07-15
Payer: COMMERCIAL

## 2022-07-15 PROCEDURE — 97110 THERAPEUTIC EXERCISES: CPT

## 2022-07-15 PROCEDURE — 97112 NEUROMUSCULAR REEDUCATION: CPT

## 2022-07-15 PROCEDURE — 97140 MANUAL THERAPY 1/> REGIONS: CPT

## 2022-07-15 NOTE — FLOWSHEET NOTE
[place] [address] [facility]  Phone: [phone] Fax: [phone]    Physical Therapy Treatment Note/ Progress Report:     Date:  7/15/2022    Patient Name:  Pearl Duran    :  1975  MRN: [id number]  Restrictions/Precautions:    Medical/Treatment Diagnosis Information:  Diagnosis: L shoulder arthroscopy, SAD, DCE, shoulder pain M25. 512  Treatment Diagnosis: L shoulder arthroscopy, SAD, DCE, shoulder pain M25. 478  Insurance/Certification information:     Physician Information:  Referring Practitioner: Dr Giovanny Dodson of care signed (Y/N):     Date of Patient follow up with Physician:      Progress Report: []  Yes  [x]  No     Date Range for reporting period:  Beginnin21  Endin22     Progress report due (10 Rx/or 30 days whichever is less):  19    Recertification due (POC duration/ or 90 days whichever is less):   22    Visit # Insurance Allowable Auth Needed   43  (25 in ) 40  5 visits left as of 7/15/22 []Yes    []No     Pain level:  1-2/10 soreness, achiness in L shoulder and soreness in the back     SUBJECTIVE:  Does feel like her L shoulder is finally feeling more normal with her everyday activities and her work activities. Still cannot fully reach all the way up over her head without bending her elbow at the top of the range. Seeing a chiro 2x per week right now to further help achieve that end range of motion in her L shoulder. Dr. Rubén Nickerson will follow back up with her again in 2 weeks at the end . OBJECTIVE: 22  Observation:   Test measurements:    27% disability - [employer]; 44% disability - [employer]     ROM Left (passive)  Post-op day 1 Left (passive)  22 Left (active)  22 Right   Shoulder Flex 160 140 125 140 170   Shoulder Abd 155 175 140 140 170   Shoulder ER @ 90 75 40  35 in scapular plane Reaches behind head to C7.  Reaches behind head to T2. 90   Shoulder IR @ 90 65 45 Reaches behind back to L1. Reaches behind back to L1. T5                     Strength  Left    Right   Shoulder Flex    6.4# 13/3#   Shoulder Scap    6.8# 16.0#   Shoulder ER    11.0# 16.6#   Shoulder IR    22.5# 24.2#       RESTRICTIONS/PRECAUTIONS: L shoulder arthroscopy 11/5/21. L shoulder scope with scar tissue debridement 3/25/22.     Exercises/Interventions:   Therapeutic Ex (98413) Sets/sec Reps CUES/Notes   UBE 4 min 1/2 retro   Supine cane AA flexion - supine - varying angle 1 10 Used red tube for active flexion, scaption, abd   Supine wand ER stretch - varying angle 1 10 Used yellow ball for LLLD today   Table Slides - flexion/abduction  1 10 R UE assist   Subscap/lat stretch - hand on mat, backing up for OH flexion stretch 5s 10    Wall slides flexion/abduction/washes 1 10 Wall slide+lift-off per emily   IR towel stretch behind back 10s 10    Doorway ER stretch - 70 deg elevation 10s 10 Corner stretch   Wall walk flexion w/45 deg rot away  10s 10 Targeting posterior capsule restrictions   Cross body adduction stretch - scapular blocked on wall, straight arm 20s 5    Sidelying open book - hand in beach chair position for posterior capsule stretch 15s 10 R sidelying   IR stretch behind back w/strap 10s 10 Ant shoulder blocked against wall   Posterior capsule stretch w/wall OP in ER behind head 10s 10    Prone posterior capsule stretch - 2#  3 min    Supine beach chair ER stretch - 2#  3 min    Doorway subscap stretch 10s 10    Wall rainbows - half kneeling 1 10          Seated shoulder flexion stretch 3 min  Hands on table, scooting stool back   Seated lat stretch 2 min  L hand on table, rotating trunk   Supine SA punch - 5# cane      Active shoulder flexion    Staying within comfortable range   Sidelying shoulder ER      Sidelying shoulder abd to 90deg      Wrist extensor stretch 20sec 4    Prone snow timo  10    Supine snow timo   10 Partial range   TB ext - ecc focus to the point of stretch - Green 2 10    TB adduction - ecc focus to the point of stretch - Green 2 10    Supine ecc abd - 2# 1 15          Manual Intervention  (23192)       L GH inferior and posterior glides - supine and sidelying  4 min Gr. II-III   L shoulder PROM - flex, abd, ER/IR  8 min Prone IR stretch  Good tolerance   STM levator and subscap     Sidelying scapular mobs  4 min Gr. III-IV   Sidelying pin and stretch subscap  3 min    Sidelying, prone posterior capsule stretch  4 min Gr. III-IV   Supine beach chair ER mobilization  4 min Gr. III-IV   Prone anterior glide  nv          NMR Re-education (08428)      Prone Ts 5s 15    Wall ball rolls abd - yellow - cw/ccw 2 10 Each direction   TB ER to uppercut - Red 3s 10                                                    Therapeutic Activity (87844)      UE throwing porgression      Dynamic UE stability      Earthquake Bar      Bodyblade                Access Code: [email or web address]  URL: [email or web address]  Date: 04/01/2022  Prepared by: Santos Jones    Exercises  Supine Shoulder Protraction with Dowel - 1-2 x daily - 7 x weekly - 2-3 sets - 10 reps  Supine Shoulder Flexion Extension Full Range AROM - 1-2 x daily - 7 x weekly - 2-3 sets - 10 reps  Sidelying Shoulder External Rotation - 1-2 x daily - 7 x weekly - 2-3 sets - 10 reps  Sidelying Shoulder Abduction Palm Forward - 1-2 x daily - 7 x weekly - 2-3 sets - 10 reps      Therapeutic Exercise and NMR EXR  [x] (05224) Provided verbal/tactile cueing for activities related to strengthening, flexibility, endurance, ROM  for improvements in scapular, scapulothoracic and UE control with self care, reaching, carrying, lifting, house/yardwork, driving/computer work.    [] (88051) Provided verbal/tactile cueing for activities related to improving balance, coordination, kinesthetic sense, posture, motor skill, proprioception  to assist with  scapular, scapulothoracic and UE control with self care, reaching, carrying, lifting, house/yardwork, driving/computer work. Therapeutic Activities:    [] (93145 or 58802) Provided verbal/tactile cueing for activities related to improving balance, coordination, kinesthetic sense, posture, motor skill, proprioception and motor activation to allow for proper function of scapular, scapulothoracic and UE control with self care, carrying, lifting, driving/computer work.      Home Exercise Program:    [x] (37135) Reviewed/Progressed HEP activities related to strengthening, flexibility, endurance, ROM of scapular, scapulothoracic and UE control with self care, reaching, carrying, lifting, house/yardwork, driving/computer work  [] (57306) Reviewed/Progressed HEP activities related to improving balance, coordination, kinesthetic sense, posture, motor skill, proprioception of scapular, scapulothoracic and UE control with self care, reaching, carrying, lifting, house/yardwork, driving/computer work      Manual Treatments:  PROM / STM / Oscillations-Mobs:  G-I, II, III, IV (PA's, Inf., Post.)  [x] (30732) Provided manual therapy to mobilize soft tissue/joints of cervical/CT, scapular GHJ and UE for the purpose of modulating pain, promoting relaxation,  increasing ROM, reducing/eliminating soft tissue swelling/inflammation/restriction, improving soft tissue extensibility and allowing for proper ROM for normal function with self care, reaching, carrying, lifting, house/yardwork, driving/computer work    Modalities:       Charges:  Timed Code Treatment Minutes: 60   Total Treatment Minutes: 60       [] EVAL (LOW) 91617 (typically 20 minutes face-to-face)  [] EVAL (MOD) 82197 (typically 30 minutes face-to-face)  [] EVAL (HIGH) 33599 (typically 45 minutes face-to-face)  [] RE-EVAL     [x] NH(79670) x 1  [] DRY NEEDLE 1 OR 2 MUSCLES  [x] NMR (83454) x 1    [] DRY NEEDLE 3+ MUSCLES  [x] Manual (76830) x 2      [] TA (06473) x     [] Mech Traction (53987)  [] ES(attended) (42178)     [] ES (un) back up with Lila again in 2 more weeks at the end of July. Also seeing a chiro 2x per week in conjunction with PT right now in the hopes that it will help her get that last little bit of range of motion. Return to Play: (if applicable)    []  Stage 1: Intro to Strength   []  Stage 2: Dynamic Strength and Intro to Plyometrics   []  Stage 3: Advanced Plyometrics and Intro to Throwing   []  Stage 4: Sport specific Training/Return to Sport     []  Ready to Return to Play, [employer]   []  Not Ready for Return to Sports   Comments:      Treatment/Activity Tolerance:  [x] Patient tolerated treatment well [] Patient limited by fatique  [] Patient limited by pain  [] Patient limited by other medical complications  [] Other:     Overall Progression Towards Functional goals/ Treatment Progress Update:  [x] Patient is progressing as expected towards functional goals listed. [] Progression is slowed due to complexities/Impairments listed. - presenting like potential frozen shoulder resulting from fall after surgery  [] Progression has been slowed due to co-morbidities. [] Plan just implemented, too soon to assess goals progression <30days   [] Goals require adjustment due to lack of progress  [] Patient is not progressing as expected and requires additional follow up with physician  [] Other    Prognosis for POC: [x] Good [] Fair  [] Poor    Patient requires continued skilled intervention: [x] Yes  [] No      PLAN: Continue PT 1-2x/week for 4 weeks  [x] Continue per plan of care [] Alter current plan (see comments)  [] Plan of care initiated [] Hold pending MD visit [] Discharge    Electronically signed by: Esau Grant, PT, DPT, MS, SCS    Note: If patient does not return for scheduled/recommended follow up visits, this note will serve as a discharge from care along with the most recent update on progress.

## 2022-07-22 ENCOUNTER — HOSPITAL ENCOUNTER (OUTPATIENT)
Dept: PHYSICAL THERAPY | Age: 47
Setting detail: THERAPIES SERIES
Discharge: HOME | End: 2022-07-22
Payer: COMMERCIAL

## 2022-07-22 PROCEDURE — 97112 NEUROMUSCULAR REEDUCATION: CPT

## 2022-07-22 PROCEDURE — 97140 MANUAL THERAPY 1/> REGIONS: CPT

## 2022-07-22 PROCEDURE — 97110 THERAPEUTIC EXERCISES: CPT

## 2022-07-22 NOTE — FLOWSHEET NOTE
24.2#       RESTRICTIONS/PRECAUTIONS: L shoulder arthroscopy 11/5/21. L shoulder scope with scar tissue debridement 3/25/22.     Exercises/Interventions:   Therapeutic Ex (58168) Sets/sec Reps CUES/Notes   UBE 4 min 1/2 retro   Supine cane AA flexion - supine - varying angle 1 10 Used red tube for active flexion, scaption, abd   Supine wand ER stretch - varying angle 1 10 Used yellow ball for LLLD today   Table Slides - flexion/abduction  1 10 R UE assist   Subscap/lat stretch in doorway - elbow bent, hand on upper back 5s 10    Wall slides flexion/abduction/washes 1 10 Wall slide+lift-off per emily   IR towel stretch behind back 10s 10    Doorway ER stretch - 70 deg elevation 10s 10 Corner stretch   Wall walk flexion w/45 deg rot away  10s 10 Targeting posterior capsule restrictions   Cross body adduction stretch - scapular blocked on wall, straight arm 20s 5    Sidelying open book - hand in beach chair position for posterior capsule stretch 15s 10 R sidelying   IR stretch behind back w/strap 10s 10 Ant shoulder blocked against wall   Posterior capsule stretch w/wall OP in ER behind head 10s 10    Prone posterior capsule stretch - 2#  3 min    Supine beach chair ER stretch - 2#  3 min    Doorway subscap stretch 10s 10    Wall rainbows - half kneeling 1 10          Seated shoulder flexion stretch 3 min  Hands on table, scooting stool back   Seated lat stretch 2 min  L hand on table, rotating trunk   Supine SA punch - 5# cane      Active shoulder flexion    Staying within comfortable range   Sidelying shoulder ER      Sidelying shoulder abd to 90deg      Wrist extensor stretch 20sec 4    Prone snow timo  10    Supine snow timo   10 Partial range   TB ext - ecc focus to the point of stretch - Green 2 10    TB adduction - ecc focus to the point of stretch - Green 2 10    Supine ecc abd - 2# 1 15          Manual Intervention  (35056)       L GH inferior and posterior glides - supine and sidelying  4 min Gr. II-III   L shoulder PROM - flex, abd, ER/IR  8 min Prone IR stretch  Good tolerance   STM levator and subscap     Sidelying scapular mobs  4 min Gr. III-IV   Sidelying pin and stretch subscap  3 min    Sidelying, prone posterior capsule stretch  4 min Gr. III-IV   Supine beach chair ER mobilization  4 min Gr. III-IV   Prone anterior glide  nv          NMR Re-education (56639)      Prone Ts 5s 15    Wall ball rolls abd - yellow - cw/ccw 2 10 Each direction   TB ER to uppercut - Red 3s 10                                                    Therapeutic Activity (26113)      UE throwing porgression      Dynamic UE stability      Earthquake Bar      Bodyblade                Access Code: [email or web address]  URL: [email or web address]  Date: 04/01/2022  Prepared by: Paulo Novoa    Exercises  Supine Shoulder Protraction with Dowel - 1-2 x daily - 7 x weekly - 2-3 sets - 10 reps  Supine Shoulder Flexion Extension Full Range AROM - 1-2 x daily - 7 x weekly - 2-3 sets - 10 reps  Sidelying Shoulder External Rotation - 1-2 x daily - 7 x weekly - 2-3 sets - 10 reps  Sidelying Shoulder Abduction Palm Forward - 1-2 x daily - 7 x weekly - 2-3 sets - 10 reps      Therapeutic Exercise and NMR EXR  [x] (98112) Provided verbal/tactile cueing for activities related to strengthening, flexibility, endurance, ROM  for improvements in scapular, scapulothoracic and UE control with self care, reaching, carrying, lifting, house/yardwork, driving/computer work.    [] (77157) Provided verbal/tactile cueing for activities related to improving balance, coordination, kinesthetic sense, posture, motor skill, proprioception  to assist with  scapular, scapulothoracic and UE control with self care, reaching, carrying, lifting, house/yardwork, driving/computer work.     Therapeutic Activities:    [] (31873 or 77589) Provided verbal/tactile cueing for activities related to improving balance, coordination, kinesthetic sense, posture, motor skill, proprioception and motor activation to allow for proper function of scapular, scapulothoracic and UE control with self care, carrying, lifting, driving/computer work. Home Exercise Program:    [x] (07040) Reviewed/Progressed HEP activities related to strengthening, flexibility, endurance, ROM of scapular, scapulothoracic and UE control with self care, reaching, carrying, lifting, house/yardwork, driving/computer work  [] (52850) Reviewed/Progressed HEP activities related to improving balance, coordination, kinesthetic sense, posture, motor skill, proprioception of scapular, scapulothoracic and UE control with self care, reaching, carrying, lifting, house/yardwork, driving/computer work      Manual Treatments:  PROM / STM / Oscillations-Mobs:  G-I, II, III, IV (PA's, Inf., Post.)  [x] (11555) Provided manual therapy to mobilize soft tissue/joints of cervical/CT, scapular GHJ and UE for the purpose of modulating pain, promoting relaxation,  increasing ROM, reducing/eliminating soft tissue swelling/inflammation/restriction, improving soft tissue extensibility and allowing for proper ROM for normal function with self care, reaching, carrying, lifting, house/yardwork, driving/computer work    Modalities:       Charges:  Timed Code Treatment Minutes: 60   Total Treatment Minutes: 60       [] EVAL (LOW) 06240 (typically 20 minutes face-to-face)  [] EVAL (MOD) 08552 (typically 30 minutes face-to-face)  [] EVAL (HIGH) 01270 (typically 45 minutes face-to-face)  [] RE-EVAL     [x] GC(22066) x 1  [] DRY NEEDLE 1 OR 2 MUSCLES  [x] NMR (01763) x 1    [] DRY NEEDLE 3+ MUSCLES  [x] Manual (36059) x 2      [] TA (08148) x     [] Mech Traction (61827)  [] ES(attended) (86890)     [] ES (un) (16382):   [] VASO (92573)  [] Other:     GOALS:  Patient stated goal: work pain free  [x] Progressing: [] Met: [] Not Met: [] Adjusted    Therapist goals for Patient:   Short Term Goals: To be achieved in: 2 weeks  1.  Independent in HEP and progression per patient tolerance, in order to prevent re-injury. [] Progressing: [x] Met: [] Not Met: [] Adjusted  2. Patient will have a decrease in pain to facilitate improvement in movement, function, and ADLs as indicated by Functional Deficits. [x] Progressing: [] Met: [] Not Met: [] Adjusted    Long Term Goals: To be achieved in: 10 weeks  1. Disability index score of 10% or less for the Quick DASH to assist with reaching prior level of function. [x] Progressing: [] Met: [] Not Met: [] Adjusted Comment:  27% disability on [employer] as of 5/6/22  2. Patient will demonstrate increased AROM to [facility] to allow for proper joint functioning as indicated by Functional Deficits. [x] Progressing: [] Met: [] Not Met: [] Adjusted  3. Patient will demonstrate an increase in NM recruitment/activation and overall GH and scapular strength to within n5lbs HHD or WNL for proper functional mobility as indicated by patients Functional Deficits. [x] Progressing: [] Met: [] Not Met: [] Adjusted  4. Patient will return to working out without pain. [x] Progressing: [] Met: [] Not Met: [] Adjusted  5. No pain with sleeping. [x] Progressing: [] Met: [] Not Met: [] Adjusted    ASSESSMENT:  L shoulder only mildly more tight, restricted today with manual assessment. Patient reports feeling a lot tighter, greater fatigue from work duties, work volume this week. Continued session focus primarily on manual techniques, self stretching addressing posterior GH joint capsule restrictions, scapular mobility restrictions particularly in subscap mobility followed by posterior shoulder, scapular activation, strengthening tasks to improve volitional muscle activation to address those mild remaining end range restrictions. Supposed to follow back up with Lila again in 2 more weeks at the end of July. Also seeing a chiro 2x per week in conjunction with PT right now in the hopes that it will help her get that last little bit of range of motion.      Return to Play: (if applicable)    []  Stage 1: Intro to Strength   []  Stage 2: Dynamic Strength and Intro to Plyometrics   []  Stage 3: Advanced Plyometrics and Intro to Throwing   []  Stage 4: Sport specific Training/Return to Sport     []  Ready to Return to Play, [employer]   []  Not Ready for Return to Sports   Comments:      Treatment/Activity Tolerance:  [x] Patient tolerated treatment well [] Patient limited by fatique  [] Patient limited by pain  [] Patient limited by other medical complications  [] Other:     Overall Progression Towards Functional goals/ Treatment Progress Update:  [x] Patient is progressing as expected towards functional goals listed. [] Progression is slowed due to complexities/Impairments listed. - presenting like potential frozen shoulder resulting from fall after surgery  [] Progression has been slowed due to co-morbidities. [] Plan just implemented, too soon to assess goals progression <30days   [] Goals require adjustment due to lack of progress  [] Patient is not progressing as expected and requires additional follow up with physician  [] Other    Prognosis for POC: [x] Good [] Fair  [] Poor    Patient requires continued skilled intervention: [x] Yes  [] No      PLAN: Continue PT 1-2x/week for 4 weeks  [x] Continue per plan of care [] Alter current plan (see comments)  [] Plan of care initiated [] Hold pending MD visit [] Discharge    Electronically signed by: Santos Jones, PT, DPT, MS, SCS    Note: If patient does not return for scheduled/recommended follow up visits, this note will serve as a discharge from care along with the most recent update on progress.

## 2022-07-26 ENCOUNTER — HOSPITAL ENCOUNTER (OUTPATIENT)
Dept: PHYSICAL THERAPY | Age: 47
Setting detail: THERAPIES SERIES
Discharge: HOME | End: 2022-07-26
Payer: COMMERCIAL

## 2022-07-26 PROCEDURE — 97112 NEUROMUSCULAR REEDUCATION: CPT

## 2022-07-26 PROCEDURE — 97110 THERAPEUTIC EXERCISES: CPT

## 2022-07-26 PROCEDURE — 97140 MANUAL THERAPY 1/> REGIONS: CPT

## 2022-07-26 NOTE — FLOWSHEET NOTE
[place] [address] [address] [place]  Phone: [phone] Fax: [phone]    Physical Therapy Treatment Note/ Progress Report:     Date:  2022    Patient Name:  Carmen Bustillo    :  1975  MRN: [id number]  Restrictions/Precautions:    Medical/Treatment Diagnosis Information:  Diagnosis: L shoulder arthroscopy, SAD, DCE, shoulder pain M25. 512  Treatment Diagnosis: L shoulder arthroscopy, SAD, DCE, shoulder pain M25. 006  Insurance/Certification information:     Physician Information:  Referring Practitioner: Dr Rk Ceja of care signed (Y/N):     Date of Patient follow up with Physician:      Progress Report: []  Yes  [x]  No     Date Range for reporting period:  Beginnin21  Endin22     Progress report due (10 Rx/or 30 days whichever is less):  64    Recertification due (POC duration/ or 90 days whichever is less):   22    Visit # Insurance Allowable Auth Needed   45  (27 in ) 40  3 visits left as of 22 []Yes    []No     Pain level:  1-2/10 soreness, achiness in L shoulder and soreness in the back     SUBJECTIVE:  Not feeling as tight, stiff today, but haven't worked through the full week yet. Definitely feels worse, more stiff, tight, tired at the end of the week after working a full schedule. OBJECTIVE: 22  Observation:   Test measurements:    27% disability - [employer]; 44% disability - [employer] Work     ROM Left (passive)  Post-op day 1 Left (passive)  22 Left (active)  22 Right   Shoulder Flex 160 140 125 140 170   Shoulder Abd 155 175 140 140 170   Shoulder ER @ 90 75 40  35 in scapular plane Reaches behind head to C7. Reaches behind head to T2. 90   Shoulder IR @ 90 65 45 Reaches behind back to L1. Reaches behind back to L1.  T5                     Strength  Left    Right   Shoulder Flex    6.4# 13/3#   Shoulder Scap    6.8# 16.0#   Shoulder ER 11.0# 16.6#   Shoulder IR    22.5# 24.2#       RESTRICTIONS/PRECAUTIONS: L shoulder arthroscopy 11/5/21. L shoulder scope with scar tissue debridement 3/25/22.     Exercises/Interventions:   Therapeutic Ex (50861) Sets/sec Reps CUES/Notes   UBE 4 min 1/2 retro   Supine cane AA flexion - supine - varying angle 1 10 Used red tube for active flexion, scaption, abd   Supine wand ER stretch - varying angle 1 10 Used yellow ball for LLLD today   Table Slides - flexion/abduction  1 10 R UE assist   Subscap/lat stretch in doorway - elbow bent, hand on upper back 5s 10    Wall slides flexion/abduction/washes 1 10 Wall slide+lift-off per emily   IR towel stretch behind back 10s 10    Doorway ER stretch - 70 deg elevation 10s 10 Corner stretch   Wall walk flexion w/45 deg rot away  10s 10 Targeting posterior capsule restrictions   Cross body adduction stretch - scapular blocked on wall, straight arm 20s 5    Sidelying open book - hand in beach chair position for posterior capsule stretch 15s 10 R sidelying   IR stretch behind back w/strap 10s 10 Ant shoulder blocked against wall   Posterior capsule stretch w/wall OP in ER behind head 10s 10    Prone posterior capsule stretch - 2#  3 min    Supine beach chair ER stretch - 2#  3 min    Doorway subscap stretch 10s 10    Wall rainbows - half kneeling 1 10          Seated shoulder flexion stretch 3 min  Hands on table, scooting stool back   Seated lat stretch 2 min  L hand on table, rotating trunk   Supine SA punch - 5# cane      Active shoulder flexion    Staying within comfortable range   Sidelying shoulder ER      Sidelying shoulder abd to 90deg      Wrist extensor stretch 20sec 4    Prone snow timo  10    Supine snow timo   10 Partial range   TB ext - ecc focus to the point of stretch - Green 2 10    TB adduction - ecc focus to the point of stretch - Green 2 10    Supine ecc abd - 2# 1 15          Manual Intervention  (48067)       L GH inferior and posterior glides - supine and sidelying  4 min Gr. II-III   L shoulder PROM - flex, abd, ER/IR  8 min Prone IR stretch  Good tolerance   STM levator and subscap     Sidelying scapular mobs  4 min Gr. III-IV   Sidelying pin and stretch subscap  3 min    Sidelying, prone posterior capsule stretch  4 min Gr. III-IV   Supine beach chair ER mobilization  4 min Gr. III-IV   Prone anterior glide  nv          NMR Re-education (90571)      Prone Ts 5s 15    Wall ball rolls abd - yellow - cw/ccw 2 10 Each direction   TB ER to uppercut - Red 3s 10    Low trap act on wall - OH Y position 5s 10 Cues to keep elbow straight                                             Therapeutic Activity (96133)      UE throwing porgression      Dynamic UE stability      Earthquake Bar      Bodyblade                Access Code: [id number]  URL: [email or web address]  Date: 04/01/2022  Prepared by: Killian Calle    Exercises  Supine Shoulder Protraction with Dowel - 1-2 x daily - 7 x weekly - 2-3 sets - 10 reps  Supine Shoulder Flexion Extension Full Range AROM - 1-2 x daily - 7 x weekly - 2-3 sets - 10 reps  Sidelying Shoulder External Rotation - 1-2 x daily - 7 x weekly - 2-3 sets - 10 reps  Sidelying Shoulder Abduction Palm Forward - 1-2 x daily - 7 x weekly - 2-3 sets - 10 reps      Therapeutic Exercise and NMR EXR  [x] (54231) Provided verbal/tactile cueing for activities related to strengthening, flexibility, endurance, ROM  for improvements in scapular, scapulothoracic and UE control with self care, reaching, carrying, lifting, house/yardwork, driving/computer work.    [] (14842) Provided verbal/tactile cueing for activities related to improving balance, coordination, kinesthetic sense, posture, motor skill, proprioception  to assist with  scapular, scapulothoracic and UE control with self care, reaching, carrying, lifting, house/yardwork, driving/computer work.     Therapeutic Activities:    [] (27973 or ) Provided verbal/tactile cueing for activities related Sport     []  Ready to Return to Play, Meets [employer] Stages   []  Not Ready for Return to Sports   Comments:      Treatment/Activity Tolerance:  [x] Patient tolerated treatment well [] Patient limited by fatique  [] Patient limited by pain  [] Patient limited by other medical complications  [] Other:     Overall Progression Towards Functional goals/ Treatment Progress Update:  [x] Patient is progressing as expected towards functional goals listed. [] Progression is slowed due to complexities/Impairments listed. - presenting like potential frozen shoulder resulting from fall after surgery  [] Progression has been slowed due to co-morbidities. [] Plan just implemented, too soon to assess goals progression <30days   [] Goals require adjustment due to lack of progress  [] Patient is not progressing as expected and requires additional follow up with physician  [] Other    Prognosis for POC: [x] Good [] Fair  [] Poor    Patient requires continued skilled intervention: [x] Yes  [] No      PLAN: Continue PT 1-2x/week for 4 weeks  [x] Continue per plan of care [] Alter current plan (see comments)  [] Plan of care initiated [] Hold pending MD visit [] Discharge    Electronically signed by: Queta Tsang, PT, DPT, MS, SCS    Note: If patient does not return for scheduled/recommended follow up visits, this note will serve as a discharge from care along with the most recent update on progress.

## 2022-08-04 ENCOUNTER — APPOINTMENT (OUTPATIENT)
Dept: PHYSICAL THERAPY | Age: 47
End: 2022-08-04
Payer: COMMERCIAL

## 2022-08-05 ENCOUNTER — HOSPITAL ENCOUNTER (OUTPATIENT)
Dept: PHYSICAL THERAPY | Age: 47
Setting detail: THERAPIES SERIES
Discharge: HOME | End: 2022-08-05
Payer: COMMERCIAL

## 2022-08-05 PROCEDURE — 97140 MANUAL THERAPY 1/> REGIONS: CPT

## 2022-08-05 PROCEDURE — 97110 THERAPEUTIC EXERCISES: CPT

## 2022-08-05 NOTE — FLOWSHEET NOTE
[place] [address] [address] [place]  Phone: [phone] Fax: [phone]    Physical Therapy Treatment Note/ Progress Report:     Date:  2022    Patient Name:  Indy Aldrich    :  1975  MRN: [id number]  Restrictions/Precautions:    Medical/Treatment Diagnosis Information:  Diagnosis: L shoulder arthroscopy, SAD, DCE, shoulder pain M25. 512  Treatment Diagnosis: L shoulder arthroscopy, SAD, DCE, shoulder pain M25. 948  Insurance/Certification information:     Physician Information:  Referring Practitioner: Dr Leanna Pace of care signed (Y/N):     Date of Patient follow up with Physician:      Progress Report: []  Yes  [x]  No     Date Range for reporting period:  Beginnin21  Endin22     Progress report due (10 Rx/or 30 days whichever is less):  22 Progress note nv. Recertification due (POC duration/ or 90 days whichever is less):   22    Visit # Insurance Allowable Auth Needed   46  (28 in ) 40  2 visits left as of 22 []Yes    []No     Pain level:  1-2/10 soreness, achiness in L shoulder and soreness in the back     SUBJECTIVE:  Patient worked four 12-hour shifts straight through this week. OBJECTIVE: 22  Observation:   Test measurements:    27% disability - [employer]; 44% disability - [employer] Work     ROM Left (passive)  Post-op day 1 Left (passive)  22 Left (active)  22 Right   Shoulder Flex 160 140 125 140 170   Shoulder Abd 155 175 140 140 170   Shoulder ER @ 90 75 40  35 in scapular plane Reaches behind head to C7. Reaches behind head to T2. 90   Shoulder IR @ 90 65 45 Reaches behind back to L1. Reaches behind back to L1. T5                     Strength  Left    Right   Shoulder Flex    6.4# 13/3#   Shoulder Scap    6.8# 16.0#   Shoulder ER    11.0# 16.6#   Shoulder IR    22.5# 24.2#       RESTRICTIONS/PRECAUTIONS: L shoulder arthroscopy 21.  L shoulder scope with scar tissue debridement 3/25/22.     Exercises/Interventions:   Therapeutic Ex (92263) Sets/sec Reps CUES/Notes   UBE 4 min 1/2 retro   Supine cane AA flexion - supine - varying angle 1 10 Used red tube for active flexion, scaption, abd   Supine wand ER stretch - varying angle 1 10 Used yellow ball for LLLD today   Table Slides - flexion/abduction  1 10 R UE assist   Banded subscap/lat stretch in doorway - elbow bent, hand on upper back 15s 10    Wall slides flexion/abduction/washes 1 10 Wall slide+lift-off per emily   IR towel stretch behind back 10s 10    Doorway ER stretch - 70 deg elevation 10s 10 Corner stretch   Wall walk flexion w/45 deg rot away  10s 10 Targeting posterior capsule restrictions   Cross body adduction stretch - scapular blocked on wall, straight arm 20s 5    Sidelying open book - hand in beach chair position for posterior capsule stretch 15s 10 R sidelying   IR stretch behind back w/strap 10s 10 Ant shoulder blocked against wall   Posterior capsule stretch w/wall OP in ER behind head 15s 10    Prone posterior capsule stretch - 2#  3 min    Supine beach chair ER stretch - 2#  3 min    Doorway subscap stretch 10s 10    Wall rainbows - half kneeling 1 10          Seated shoulder flexion stretch 3 min  Hands on table, scooting stool back   Seated lat stretch 2 min  L hand on table, rotating trunk   Supine SA punch - 5# cane      Active shoulder flexion    Staying within comfortable range   Sidelying shoulder ER      Sidelying shoulder abd to 90deg      Wrist extensor stretch 20sec 4    Prone snow timo  10    Supine snow timo   10 Partial range   TB ext - ecc focus to the point of stretch - Green 2 10    TB adduction - ecc focus to the point of stretch - Green 2 10    Supine ecc abd - 2# 1 15          Manual Intervention  (53103)       L GH inferior and posterior glides - supine and sidelying  4 min Gr. II-III   L shoulder PROM - flex, abd, ER/IR  12 min Prone IR stretch  Good tolerance   STM levator and subscap     Sidelying scapular mobs  4 min Gr. III-IV   Sidelying pin and stretch subscap  6 min    Sidelying, prone posterior capsule stretch  5 min Gr. III-IV   Supine beach chair ER mobilization  5 min Gr. III-IV               NMR Re-education (80073)      Prone Ts 5s 15    Wall ball rolls abd - yellow - cw/ccw 2 10 Each direction   TB ER to uppercut - Red 3s 10    Low trap act on wall - OH Y position 5s 10 Cues to keep elbow straight                                             Therapeutic Activity (75695)      UE throwing porgression      Dynamic UE stability      Earthquake Bar      Bodyblade                Access Code: [id number]  URL: [email or web address]  Date: 04/01/2022  Prepared by: Nestor Thomas    Exercises  Supine Shoulder Protraction with Dowel - 1-2 x daily - 7 x weekly - 2-3 sets - 10 reps  Supine Shoulder Flexion Extension Full Range AROM - 1-2 x daily - 7 x weekly - 2-3 sets - 10 reps  Sidelying Shoulder External Rotation - 1-2 x daily - 7 x weekly - 2-3 sets - 10 reps  Sidelying Shoulder Abduction Palm Forward - 1-2 x daily - 7 x weekly - 2-3 sets - 10 reps      Therapeutic Exercise and NMR EXR  [x] (35160) Provided verbal/tactile cueing for activities related to strengthening, flexibility, endurance, ROM  for improvements in scapular, scapulothoracic and UE control with self care, reaching, carrying, lifting, house/yardwork, driving/computer work.    [] (05619) Provided verbal/tactile cueing for activities related to improving balance, coordination, kinesthetic sense, posture, motor skill, proprioception  to assist with  scapular, scapulothoracic and UE control with self care, reaching, carrying, lifting, house/yardwork, driving/computer work.     Therapeutic Activities:    [] (91293 or 47625) Provided verbal/tactile cueing for activities related to improving balance, coordination, kinesthetic sense, posture, motor skill, proprioception and motor activation to allow for proper function of scapular, scapulothoracic and UE control with self care, carrying, lifting, driving/computer work. Home Exercise Program:    [x] (10243) Reviewed/Progressed HEP activities related to strengthening, flexibility, endurance, ROM of scapular, scapulothoracic and UE control with self care, reaching, carrying, lifting, house/yardwork, driving/computer work  [] (31237) Reviewed/Progressed HEP activities related to improving balance, coordination, kinesthetic sense, posture, motor skill, proprioception of scapular, scapulothoracic and UE control with self care, reaching, carrying, lifting, house/yardwork, driving/computer work      Manual Treatments:  PROM / STM / Oscillations-Mobs:  G-I, II, III, IV (PA's, Inf., Post.)  [x] (55883) Provided manual therapy to mobilize soft tissue/joints of cervical/CT, scapular GHJ and UE for the purpose of modulating pain, promoting relaxation,  increasing ROM, reducing/eliminating soft tissue swelling/inflammation/restriction, improving soft tissue extensibility and allowing for proper ROM for normal function with self care, reaching, carrying, lifting, house/yardwork, driving/computer work    Modalities:       Charges:  Timed Code Treatment Minutes: 52   Total Treatment Minutes: 52       [] EVAL (LOW) 58785 (typically 20 minutes face-to-face)  [] EVAL (MOD) 57560 (typically 30 minutes face-to-face)  [] EVAL (HIGH) 00467 (typically 45 minutes face-to-face)  [] RE-EVAL     [x] IY(34839) x 1  [] DRY NEEDLE 1 OR 2 MUSCLES  [] NMR (56865) x     [] DRY NEEDLE 3+ MUSCLES  [x] Manual (60536) x 2      [] TA (09176) x     [] [facility] (72233)  [] ES(attended) (74054)     [] ES (un) (15100):   [] VASO (96342)  [] Other:     GOALS:  Patient stated goal: work pain free  [x] Progressing: [] Met: [] Not Met: [] Adjusted    Therapist goals for Patient:   Short Term Goals: To be achieved in: 2 weeks  1.  Independent in HEP and progression per patient tolerance, in order to prevent re-injury. [] Progressing: [x] Met: [] Not Met: [] Adjusted  2. Patient will have a decrease in pain to facilitate improvement in movement, function, and ADLs as indicated by Functional Deficits. [x] Progressing: [] Met: [] Not Met: [] Adjusted    Long Term Goals: To be achieved in: 10 weeks  1. Disability index score of 10% or less for the Quick DASH to assist with reaching prior level of function. [x] Progressing: [] Met: [] Not Met: [] Adjusted Comment:  27% disability on Quick Dash as of 5/6/22  2. Patient will demonstrate increased AROM to [facility] to allow for proper joint functioning as indicated by Functional Deficits. [x] Progressing: [] Met: [] Not Met: [] Adjusted  3. Patient will demonstrate an increase in NM recruitment/activation and overall GH and scapular strength to within n5lbs HHD or WNL for proper functional mobility as indicated by patients Functional Deficits. [x] Progressing: [] Met: [] Not Met: [] Adjusted  4. Patient will return to working out without pain. [x] Progressing: [] Met: [] Not Met: [] Adjusted  5. No pain with sleeping. [x] Progressing: [] Met: [] Not Met: [] Adjusted    ASSESSMENT:   Definitely more tight, stiff, limited in subscap and posterior capsule mobility today likely due to patient working four 12-hour shifts straight through this week. Patient typically a lot more stiff, restricted when she works longer hours with less time in between shifts. Session focus primarily on manual techniques, self stretching addressing posterior GH joint capsule restrictions, subscapular mobility restrictions. Deferred scapular activation, strength tasks today due to mobility being more limited.  Supposed to follow back up with Tiny Jha again in mid August.    Return to Play: (if applicable)    []  Stage 1: Intro to Strength   []  Stage 2: Dynamic Strength and Intro to Plyometrics   []  Stage 3: Advanced Plyometrics and Intro to Throwing   []  Stage 4: Sport specific Training/Return to Sport     []  Ready to Return to Play, [employer]   []  Not Ready for Return to Sports   Comments:      Treatment/Activity Tolerance:  [x] Patient tolerated treatment well [] Patient limited by fatique  [] Patient limited by pain  [] Patient limited by other medical complications  [] Other:     Overall Progression Towards Functional goals/ Treatment Progress Update:  [x] Patient is progressing as expected towards functional goals listed. [] Progression is slowed due to complexities/Impairments listed. - presenting like potential frozen shoulder resulting from fall after surgery  [] Progression has been slowed due to co-morbidities. [] Plan just implemented, too soon to assess goals progression <30days   [] Goals require adjustment due to lack of progress  [] Patient is not progressing as expected and requires additional follow up with physician  [] Other    Prognosis for POC: [x] Good [] Fair  [] Poor    Patient requires continued skilled intervention: [x] Yes  [] No      PLAN: Continue PT 1-2x/week for 4 weeks  [x] Continue per plan of care [] Alter current plan (see comments)  [] Plan of care initiated [] Hold pending MD visit [] Discharge    Electronically signed by: Santos Jones, PT, DPT, MS, SCS    Note: If patient does not return for scheduled/recommended follow up visits, this note will serve as a discharge from care along with the most recent update on progress.

## 2022-08-12 ENCOUNTER — HOSPITAL ENCOUNTER (OUTPATIENT)
Dept: PHYSICAL THERAPY | Age: 47
Setting detail: THERAPIES SERIES
Discharge: HOME | End: 2022-08-12
Payer: COMMERCIAL

## 2022-08-12 PROCEDURE — 97112 NEUROMUSCULAR REEDUCATION: CPT

## 2022-08-12 PROCEDURE — 97110 THERAPEUTIC EXERCISES: CPT

## 2022-08-12 PROCEDURE — 97140 MANUAL THERAPY 1/> REGIONS: CPT

## 2022-08-12 NOTE — PLAN OF CARE
[place] [address] [address]  Phone: [phone] Fax: [phone]      Physical Therapy Re-Certification Plan of Care/MD UPDATE      Dear Dr. Trace Mccracken,    We had the pleasure of treating the following patient for physical therapy services at [address] [place] [address]. A summary of our findings can be found in the updated assessment below. This includes our plan of care. If you have any questions or concerns regarding these findings, please do not hesitate to contact me at the office phone number checked above. Thank you for the referral.     Physician Signature:________________________________Date:__________________  By signing above (or electronic signature), therapists plan is approved by physician    Date Range Of Visits: 7/1/22 to 8/12/22  Total Visits to Date: 52 (34 in 2022)  Overall Response to Treatment:   [x]Patient is responding well to treatment and improvement is noted with regards  to goals   []Patient should continue to improve in reasonable time if they continue HEP   []Patient has plateaued and is no longer responding to skilled PT intervention    []Patient is getting worse and would benefit from return to referring MD   []Patient unable to adhere to initial POC   [x]Other: Patient has been seen for 47 PT visits (29 in 2022) for the L shoulder. Patient had a L shoulder arthroscopy on 11/5/21 and then again with scar tissue debridement on 3/25/22. Patient's L shoulder ROM measures looks better compared to previous. She is able to get further into those end ranges with OH flexion, OH abduction, IR behind back, and ER behind her head,  although, these measures can vary depending on patient's work schedule. She is typically more stiff, restricted after working longer hours during the week.  She could definitely still benefit from continued aggressive manual techniques and stretching to address remaining posterior capsule and subscap restrictions. These remaining restrictions are definitely still limiting patient's strength gains as evidenced by her updated strength measures today, which did improve minimally compared to previous. Patient is close to exhausting her physical therapy insurance benefits. Will likely see her for that last visit and will discuss how she wants to proceed from there. Patient followed up with Dr. Kerry Bunch yesterday. Dr. Kerry Bunch seemed happier with her current status per patient and discharged patient from his care at this time. Physical Therapy Treatment Note/ Progress Report:     Date:  2022    Patient Name:  Eric Kinney    :  1975  MRN: [id number]  Restrictions/Precautions:    Medical/Treatment Diagnosis Information:  Diagnosis: L shoulder arthroscopy, SAD, DCE, shoulder pain M25. 512  Treatment Diagnosis: L shoulder arthroscopy, SAD, DCE, shoulder pain M25. 739  Insurance/Certification information:     Physician Information:  Referring Practitioner: Dr Cynthia Santana of care signed (Y/N):     Date of Patient follow up with Physician: No follow ups planned. Progress Report: [x]  Yes  []  No     Date Range for reporting period:  Beginnin21  Endin22     Progress report due (10 Rx/or 30 days whichever is less):      Recertification due (POC duration/ or 90 days whichever is less):   22    Visit # Insurance Allowable Auth Needed   47  (29 in ) 40  1 visits left as of 22 []Yes    []No     Pain level:  1-2/10 soreness, achiness in L shoulder and soreness in the back     SUBJECTIVE:   Saw Dr. Kerry Bunch yesterday. Patient did say she discussed with him that she still notices some pretty major tightness and worsening stiffness after working a lot of hours. Patient also told Dr. Kerry Bunch that for the past 3-4 weeks she has been doing chiropractic 2x per week and PT 1x per week to get that last little bit of over head motion out of her L shoulder.   Lila did not seem to be too concerned with that, but did encourage her to continue her stretching and strengthening. Dr. Torres Anna happier with patient's current status. Discharged patient from seeing him at this time. OBJECTIVE: 7/1/22  Observation:   Test measurements:    27% disability - [employer]; 44% disability - [employer] Work     ROM Left (passive)  Post-op day 1 Left (passive)  5/6/22 Left (active)  5/6/22 7/1/22 8/12/22 Right   Shoulder Flex 160 140 125 140 155 170   Shoulder Abd 155 175 140 140 145/157 170   Shoulder ER @ 90 75 40  35 in scapular plane Reaches behind head to C7. Reaches behind head to T2. Reaches behind head to T2. 90   Shoulder IR @ 90 65 45 Reaches behind back to L1. Reaches behind back to L1. Reaches behind back to T10. T5                       Strength  Left     Right   Shoulder Flex    6.4# 7.4# 13/3#   Shoulder Scap    6.8# 7.5# 16.0#   Shoulder ER    11.0# 11.1# 16.6#   Shoulder IR    22.5# 22.5# 24.2#       RESTRICTIONS/PRECAUTIONS: L shoulder arthroscopy 11/5/21. L shoulder scope with scar tissue debridement 3/25/22.     Exercises/Interventions:   Therapeutic Ex (61968) Sets/sec Reps CUES/Notes   UBE 4 min 1/2 retro   Supine cane AA flexion - supine - varying angle 1 10 Used red tube for active flexion, scaption, abd   Supine wand ER stretch - varying angle 1 10 Used yellow ball for LLLD today   Table Slides - flexion/abduction  1 10 R UE assist   Banded subscap/lat stretch in doorway - elbow bent, hand on upper back 15s 10    Wall slides flexion/abduction/washes 1 10 Wall slide+lift-off per emily   IR towel stretch behind back 10s 10    Doorway ER stretch - 70 deg elevation 10s 10 Corner stretch   Wall walk flexion w/45 deg rot away  10s 10 Targeting posterior capsule restrictions   Cross body adduction stretch - scapular blocked on wall, straight arm 20s 5    Sidelying open book - hand in beach chair position for posterior capsule stretch 15s 10 R sidelying   IR stretch behind back w/strap 10s 10 Ant shoulder blocked against wall   Posterior capsule stretch w/wall OP in ER behind head 15s 10    Prone posterior capsule stretch - 2#  3 min    Supine beach chair ER stretch - 2#  3 min    Doorway subscap stretch 10s 10    Wall rainbows - half kneeling 1 10          Seated shoulder flexion stretch 3 min  Hands on table, scooting stool back   Seated lat stretch 2 min  L hand on table, rotating trunk   Supine SA punch - 5# cane      Active shoulder flexion    Staying within comfortable range   Sidelying shoulder ER      Sidelying shoulder abd to 90deg      Wrist extensor stretch 20sec 4    Prone snow timo  10    Supine snow timo   10 Partial range   TB ext - ecc focus to the point of stretch - Green 2 10    TB adduction - ecc focus to the point of stretch - Green 2 10    Supine ecc abd - 2# 1 15          Manual Intervention  (93382)       L GH inferior and posterior glides - supine and sidelying  4 min Gr. II-III   L shoulder PROM - flex, abd, ER/IR  12 min Prone IR stretch  Good tolerance   STM levator and subscap     Sidelying scapular mobs  4 min Gr. III-IV   Sidelying pin and stretch subscap  6 min    Sidelying, prone posterior capsule stretch  5 min Gr. III-IV   Supine beach chair ER mobilization  5 min Gr. III-IV               NMR Re-education (82288)      Prone Ts 5s 15    Wall ball rolls abd - yellow - cw/ccw 2 10 Each direction   TB ER to uppercut - Red 3s 10    Low trap act on wall - OH Y position 5s 10 Cues to keep elbow straight                                             Therapeutic Activity (32264)      UE throwing porgression      Dynamic UE stability      Earthquake Bar      Bodyblade                Access Code: [email or web address]  URL: [email or web address]  Date: 04/01/2022  Prepared by: Paulo Novoa    Exercises  Supine Shoulder Protraction with Dowel - 1-2 x daily - 7 x weekly - 2-3 sets - 10 reps  Supine Shoulder Flexion Extension Full Range AROM - 1-2 x daily - 7 x weekly - 2-3 sets - 10 reps  Sidelying Shoulder External Rotation - 1-2 x daily - 7 x weekly - 2-3 sets - 10 reps  Sidelying Shoulder Abduction Palm Forward - 1-2 x daily - 7 x weekly - 2-3 sets - 10 reps      Therapeutic Exercise and NMR EXR  [x] (16875) Provided verbal/tactile cueing for activities related to strengthening, flexibility, endurance, ROM  for improvements in scapular, scapulothoracic and UE control with self care, reaching, carrying, lifting, house/yardwork, driving/computer work.    [] (29550) Provided verbal/tactile cueing for activities related to improving balance, coordination, kinesthetic sense, posture, motor skill, proprioception  to assist with  scapular, scapulothoracic and UE control with self care, reaching, carrying, lifting, house/yardwork, driving/computer work. Therapeutic Activities:    [] (98336 or 56999) Provided verbal/tactile cueing for activities related to improving balance, coordination, kinesthetic sense, posture, motor skill, proprioception and motor activation to allow for proper function of scapular, scapulothoracic and UE control with self care, carrying, lifting, driving/computer work.      Home Exercise Program:    [x] (57627) Reviewed/Progressed HEP activities related to strengthening, flexibility, endurance, ROM of scapular, scapulothoracic and UE control with self care, reaching, carrying, lifting, house/yardwork, driving/computer work  [] (17060) Reviewed/Progressed HEP activities related to improving balance, coordination, kinesthetic sense, posture, motor skill, proprioception of scapular, scapulothoracic and UE control with self care, reaching, carrying, lifting, house/yardwork, driving/computer work      Manual Treatments:  PROM / STM / Oscillations-Mobs:  G-I, II, III, IV (PA's, Inf., Post.)  [x] (06829) Provided manual therapy to mobilize soft tissue/joints of cervical/CT, scapular GHJ and UE for the purpose of modulating pain, promoting relaxation,  increasing ROM, reducing/eliminating soft tissue swelling/inflammation/restriction, improving soft tissue extensibility and allowing for proper ROM for normal function with self care, reaching, carrying, lifting, house/yardwork, driving/computer work    Modalities:       Charges:  Timed Code Treatment Minutes: 65   Total Treatment Minutes: 65       [] EVAL (LOW) 36852 (typically 20 minutes face-to-face)  [] EVAL (MOD) 22719 (typically 30 minutes face-to-face)  [] EVAL (HIGH) 29180 (typically 45 minutes face-to-face)  [] RE-EVAL     [x] CV(34210) x 1  [] DRY NEEDLE 1 OR 2 MUSCLES  [x] NMR (99229) x 1    [] DRY NEEDLE 3+ MUSCLES  [x] Manual (52125) x 2      [] TA (93098) x     [] Mech Traction (31456)  [] ES(attended) (69472)     [] ES (un) (74290):   [] VASO (61137)  [] Other:     GOALS:  Patient stated goal: work pain free  [] Progressing: [x] Met: [] Not Met: [] Adjusted    Therapist goals for Patient:   Short Term Goals: To be achieved in: 2 weeks  1. Independent in HEP and progression per patient tolerance, in order to prevent re-injury. [] Progressing: [x] Met: [] Not Met: [] Adjusted  2. Patient will have a decrease in pain to facilitate improvement in movement, function, and ADLs as indicated by Functional Deficits. [] Progressing: [x] Met: [] Not Met: [] Adjusted    Long Term Goals: To be achieved in: 10 weeks  1. Disability index score of 10% or less for the [employer] to assist with reaching prior level of function. [x] Progressing: [] Met: [] Not Met: [] Adjusted Comment:  27% disability on [employer] as of 5/6/22 TBA  2. Patient will demonstrate increased AROM to [facility] to allow for proper joint functioning as indicated by Functional Deficits. [] Progressing: [x] Met: [] Not Met: [] Adjusted  Comment:  Still some mild limitations at end range OH flexion, OH abduction, IR behind back, ER behind head  3.  Patient will demonstrate an increase in NM recruitment/activation and overall GH and scapular strength to within n5lbs HHD or WNL for proper functional mobility as indicated by patients Functional Deficits. [x] Progressing: [] Met: [] Not Met: [] Adjusted  4. Patient will return to working out without pain. [] Progressing: [x] Met: [] Not Met: [] Adjusted  5. No pain with sleeping. [] Progressing: [x] Met: [] Not Met: [] Adjusted    ASSESSMENT:   Patient has been seen for 52 PT visits (29 in 2022) for the L shoulder. Patient had a L shoulder arthroscopy on 11/5/21 and then again with scar tissue debridement on 3/25/22. Patient's L shoulder ROM measures looks better compared to previous. She is able to get further into those end ranges with OH flexion, OH abduction, IR behind back, and ER behind her head,  although, these measures can vary depending on patient's work schedule. She is typically more stiff, restricted after working longer hours during the week. She could definitely still benefit from continued aggressive manual techniques and stretching to address remaining posterior capsule and subscap restrictions. These remaining restrictions are definitely still limiting patient's strength gains as evidenced by her updated strength measures today, which did improve minimally compared to previous. Patient is close to exhausting her physical therapy insurance benefits. Will likely see her for that last visit and will discuss how she wants to proceed from there. Patient followed up with Dr. Isac Deleon yesterday. Dr. Isac Deleon seemed happier with her current status per patient and discharged patient from his care at this time.      Return to Play: (if applicable)    []  Stage 1: Intro to Strength   []  Stage 2: Dynamic Strength and Intro to Plyometrics   []  Stage 3: Advanced Plyometrics and Intro to Throwing   []  Stage 4: Sport specific Training/Return to Sport     []  Ready to Return to Play, [employer] Stages   []  Not Ready for Return to Sports   Comments:      Treatment/Activity Tolerance:  [x] Patient tolerated treatment well [] Patient limited by fatique  [] Patient limited by pain  [] Patient limited by other medical complications  [] Other:     Overall Progression Towards Functional goals/ Treatment Progress Update:  [x] Patient is progressing as expected towards functional goals listed. [] Progression is slowed due to complexities/Impairments listed. - presenting like potential frozen shoulder resulting from fall after surgery  [] Progression has been slowed due to co-morbidities. [] Plan just implemented, too soon to assess goals progression <30days   [] Goals require adjustment due to lack of progress  [] Patient is not progressing as expected and requires additional follow up with physician  [] Other    Prognosis for POC: [x] Good [] Fair  [] Poor    Patient requires continued skilled intervention: [x] Yes  [] No      PLAN: Continue PT 1-2x/week for 4 weeks  [x] Continue per plan of care [] Alter current plan (see comments)  [] Plan of care initiated [] Hold pending MD visit [] Discharge    Electronically signed by: Chepe Valle, PT, DPT, MS, SCS    Note: If patient does not return for scheduled/recommended follow up visits, this note will serve as a discharge from care along with the most recent update on progress.

## 2022-08-18 ENCOUNTER — HOSPITAL ENCOUNTER (OUTPATIENT)
Dept: PHYSICAL THERAPY | Age: 47
Setting detail: THERAPIES SERIES
Discharge: HOME | End: 2022-08-18
Payer: COMMERCIAL

## 2022-08-18 PROCEDURE — 97112 NEUROMUSCULAR REEDUCATION: CPT

## 2022-08-18 PROCEDURE — 97110 THERAPEUTIC EXERCISES: CPT

## 2022-08-18 PROCEDURE — 97140 MANUAL THERAPY 1/> REGIONS: CPT

## 2022-08-18 NOTE — FLOWSHEET NOTE
[place] [address] [address]  Phone: [phone] Fax: [phone]    Physical Therapy Treatment Note/ Progress Report:     Date:  2022    Patient Name:  Yamile Jones    :  1975  MRN: [id number]  Restrictions/Precautions:    Medical/Treatment Diagnosis Information:  Diagnosis: L shoulder arthroscopy, SAD, DCE, shoulder pain M25. 512  Treatment Diagnosis: L shoulder arthroscopy, SAD, DCE, shoulder pain M25. 377  Insurance/Certification information:     Physician Information:  Referring Practitioner: Dr Frances Pink of care signed (Y/N):     Date of Patient follow up with Physician: No follow ups planned. Progress Report: []  Yes  [x]  No     Date Range for reporting period:  Beginnin21  Endin22     Progress report due (10 Rx/or 30 days whichever is less):      Recertification due (POC duration/ or 90 days whichever is less):   22    Visit # Insurance Allowable Auth Needed   48  (30 in ) 40  0 visits left as of 22 []Yes    []No     Pain level:  1-2/10 soreness, achiness in L shoulder and soreness in the back     SUBJECTIVE:   Has worked two 10 hour days already this week. Was supposed to be off today, but ended up getting called in at another clinic to help, so headed there after this session. Thinking her L shoulder is probably really tight from working so much the past few days. OBJECTIVE: 22  Observation:   Test measurements:    27% disability - Quick Dash; 44% disability - [employer]     ROM Left (passive)  Post-op day 1 Left (passive)  22 Left (active)  22 Right   Shoulder Flex 160 140 125 140 155 170   Shoulder Abd 155 175 140 140 145/157 170   Shoulder ER @ 90 75 40  35 in scapular plane Reaches behind head to C7. Reaches behind head to T2. Reaches behind head to T2. 90   Shoulder IR @ 90 65 45 Reaches behind back to L1.  Reaches behind stretch - Green 2 10    TB adduction - ecc focus to the point of stretch - Green 2 10    Supine ecc abd - 2# 1 15          Manual Intervention  (57034)       L GH inferior and posterior glides - supine and sidelying  4 min Gr. II-III   L shoulder PROM - flex, abd, ER/IR  12 min Prone IR stretch  Good tolerance   STM levator and subscap     Sidelying scapular mobs  4 min Gr. III-IV   Sidelying pin and stretch subscap  6 min    Sidelying, prone posterior capsule stretch  5 min Gr. III-IV   Supine beach chair ER mobilization  5 min Gr. III-IV               NMR Re-education (43651)      Prone Ts 5s 15    Wall ball rolls abd - yellow - cw/ccw 2 10 Each direction   TB ER to uppercut - Red 3s 10    Low trap act on wall - OH Y position 5s 10 Cues to keep elbow straight   SWB serratus wall ball rolls into OH flex 2 10 bilat                                       Therapeutic Activity (29679)      UE throwing porgression      Dynamic UE stability      Earthquake Bar      Bodyblade                Access Code: [email or web address]  URL: [email or web address]  Date: 04/01/2022  Prepared by: Killian Calle    Exercises  Supine Shoulder Protraction with Dowel - 1-2 x daily - 7 x weekly - 2-3 sets - 10 reps  Supine Shoulder Flexion Extension Full Range AROM - 1-2 x daily - 7 x weekly - 2-3 sets - 10 reps  Sidelying Shoulder External Rotation - 1-2 x daily - 7 x weekly - 2-3 sets - 10 reps  Sidelying Shoulder Abduction Palm Forward - 1-2 x daily - 7 x weekly - 2-3 sets - 10 reps      Therapeutic Exercise and NMR EXR  [x] (45789) Provided verbal/tactile cueing for activities related to strengthening, flexibility, endurance, ROM  for improvements in scapular, scapulothoracic and UE control with self care, reaching, carrying, lifting, house/yardwork, driving/computer work.    [] (33180) Provided verbal/tactile cueing for activities related to improving balance, coordination, kinesthetic sense, posture, motor skill, proprioception  to assist with  scapular, scapulothoracic and UE control with self care, reaching, carrying, lifting, house/yardwork, driving/computer work. Therapeutic Activities:    [] (20125 or 81362) Provided verbal/tactile cueing for activities related to improving balance, coordination, kinesthetic sense, posture, motor skill, proprioception and motor activation to allow for proper function of scapular, scapulothoracic and UE control with self care, carrying, lifting, driving/computer work.      Home Exercise Program:    [x] (97570) Reviewed/Progressed HEP activities related to strengthening, flexibility, endurance, ROM of scapular, scapulothoracic and UE control with self care, reaching, carrying, lifting, house/yardwork, driving/computer work  [] (48938) Reviewed/Progressed HEP activities related to improving balance, coordination, kinesthetic sense, posture, motor skill, proprioception of scapular, scapulothoracic and UE control with self care, reaching, carrying, lifting, house/yardwork, driving/computer work      Manual Treatments:  PROM / STM / Oscillations-Mobs:  G-I, II, III, IV (PA's, Inf., Post.)  [x] (06810) Provided manual therapy to mobilize soft tissue/joints of cervical/CT, scapular GHJ and UE for the purpose of modulating pain, promoting relaxation,  increasing ROM, reducing/eliminating soft tissue swelling/inflammation/restriction, improving soft tissue extensibility and allowing for proper ROM for normal function with self care, reaching, carrying, lifting, house/yardwork, driving/computer work    Modalities:       Charges:  Timed Code Treatment Minutes: 65   Total Treatment Minutes: 65       [] EVAL (LOW) 85565 (typically 20 minutes face-to-face)  [] EVAL (MOD) 56191 (typically 30 minutes face-to-face)  [] EVAL (HIGH) 73221 (typically 45 minutes face-to-face)  [] RE-EVAL     [x] RW(89527) x 1  [] DRY NEEDLE 1 OR 2 MUSCLES  [x] NMR (28765) x 1    [] DRY NEEDLE 3+ MUSCLES  [x] Manual (19915) x 2      [] TA (53077) x [] [facility] Traction (23546)  [] ES(attended) (44247)     [] ES (un) (57287):   [] VASO (27920)  [] Other:     GOALS:  Patient stated goal: work pain free  [] Progressing: [x] Met: [] Not Met: [] Adjusted    Therapist goals for Patient:   Short Term Goals: To be achieved in: 2 weeks  1. Independent in HEP and progression per patient tolerance, in order to prevent re-injury. [] Progressing: [x] Met: [] Not Met: [] Adjusted  2. Patient will have a decrease in pain to facilitate improvement in movement, function, and ADLs as indicated by Functional Deficits. [] Progressing: [x] Met: [] Not Met: [] Adjusted    Long Term Goals: To be achieved in: 10 weeks  1. Disability index score of 10% or less for the Quick DASH to assist with reaching prior level of function. [x] Progressing: [] Met: [] Not Met: [] Adjusted Comment:  27% disability on Quick Dash as of 5/6/22 TBA  2. Patient will demonstrate increased AROM to [facility] to allow for proper joint functioning as indicated by Functional Deficits. [] Progressing: [x] Met: [] Not Met: [] Adjusted  Comment:  Still some mild limitations at end range OH flexion, OH abduction, IR behind back, ER behind head  3. Patient will demonstrate an increase in NM recruitment/activation and overall GH and scapular strength to within n5lbs HHD or WNL for proper functional mobility as indicated by patients Functional Deficits. [x] Progressing: [] Met: [] Not Met: [] Adjusted  4. Patient will return to working out without pain. [] Progressing: [x] Met: [] Not Met: [] Adjusted  5. No pain with sleeping. [] Progressing: [x] Met: [] Not Met: [] Adjusted    ASSESSMENT:   Session focus on manual techniques, self stretches, and strengthening on RTC, rhomboids, low trap, specifically at end ranges OH where she is still somewhat restricted.  She could definitely still benefit from continued aggressive manual techniques and stretching to address remaining posterior capsule and subscap restrictions limiting her end range OH flexion, abduction, however, patient has exhausted all physical therapy benefits at this time. Will discuss how she wants to proceed from here. Patient is good about performing stretches, strengthening at home on her own. May just need PT intermittently for manual techniques when ROM is more restricted, especially after weeks where is working very long hours with less time to stretch and focus on L shoulder mobility. Return to Play: (if applicable)    []  Stage 1: Intro to Strength   []  Stage 2: Dynamic Strength and Intro to Plyometrics   []  Stage 3: Advanced Plyometrics and Intro to Throwing   []  Stage 4: Sport specific Training/Return to Sport     []  Ready to Return to Play, [employer]   []  Not Ready for Return to Sports   Comments:      Treatment/Activity Tolerance:  [x] Patient tolerated treatment well [] Patient limited by fatique  [] Patient limited by pain  [] Patient limited by other medical complications  [] Other:     Overall Progression Towards Functional goals/ Treatment Progress Update:  [x] Patient is progressing as expected towards functional goals listed. [] Progression is slowed due to complexities/Impairments listed. - presenting like potential frozen shoulder resulting from fall after surgery  [] Progression has been slowed due to co-morbidities.   [] Plan just implemented, too soon to assess goals progression <30days   [] Goals require adjustment due to lack of progress  [] Patient is not progressing as expected and requires additional follow up with physician  [] Other    Prognosis for POC: [x] Good [] Fair  [] Poor    Patient requires continued skilled intervention: [x] Yes  [] No      PLAN: Continue PT 1-2x/week for 4 weeks  [x] Continue per plan of care [] Alter current plan (see comments)  [] Plan of care initiated [] Hold pending MD visit [] Discharge    Electronically signed by: Loree Guerrero, PT, DPT, MS, SCS    Note: If patient does not return for scheduled/recommended follow up visits, this note will serve as a discharge from care along with the most recent update on progress.